# Patient Record
Sex: FEMALE | Race: WHITE | NOT HISPANIC OR LATINO | URBAN - METROPOLITAN AREA
[De-identification: names, ages, dates, MRNs, and addresses within clinical notes are randomized per-mention and may not be internally consistent; named-entity substitution may affect disease eponyms.]

---

## 2017-01-25 ENCOUNTER — OFFICE (AMBULATORY)
Dept: URBAN - METROPOLITAN AREA CLINIC 75 | Facility: CLINIC | Age: 57
End: 2017-01-25

## 2017-01-25 VITALS
SYSTOLIC BLOOD PRESSURE: 126 MMHG | WEIGHT: 143 LBS | HEIGHT: 64 IN | DIASTOLIC BLOOD PRESSURE: 88 MMHG | HEART RATE: 64 BPM

## 2017-01-25 DIAGNOSIS — K90.0 CELIAC DISEASE: ICD-10-CM

## 2017-01-25 DIAGNOSIS — K44.9 DIAPHRAGMATIC HERNIA WITHOUT OBSTRUCTION OR GANGRENE: ICD-10-CM

## 2017-01-25 DIAGNOSIS — R93.3 ABNORMAL FINDINGS ON DIAGNOSTIC IMAGING OF OTHER PARTS OF DI: ICD-10-CM

## 2017-01-25 DIAGNOSIS — K26.9 DUODENAL ULCER, UNSPECIFIED AS ACUTE OR CHRONIC, WITHOUT HEM: ICD-10-CM

## 2017-01-25 DIAGNOSIS — K22.70 BARRETT'S ESOPHAGUS WITHOUT DYSPLASIA: ICD-10-CM

## 2017-01-25 DIAGNOSIS — K52.9 NONINFECTIVE GASTROENTERITIS AND COLITIS, UNSPECIFIED: ICD-10-CM

## 2017-01-25 DIAGNOSIS — R19.7 DIARRHEA, UNSPECIFIED: ICD-10-CM

## 2017-01-25 DIAGNOSIS — R10.84 GENERALIZED ABDOMINAL PAIN: ICD-10-CM

## 2017-01-25 PROCEDURE — 99213 OFFICE O/P EST LOW 20 MIN: CPT | Performed by: INTERNAL MEDICINE

## 2017-01-25 RX ORDER — ESOMEPRAZOLE MAGNESIUM 40 MG/1
40 CAPSULE, DELAYED RELEASE ORAL
Refills: 0 | Status: COMPLETED
End: 2017-01-25

## 2017-01-25 RX ORDER — MESALAMINE 1.2 G/1
TABLET, DELAYED RELEASE ORAL
Qty: 360 | Refills: 3 | Status: COMPLETED
End: 2024-08-14

## 2017-03-03 ENCOUNTER — APPOINTMENT (OUTPATIENT)
Dept: WOMENS IMAGING | Facility: HOSPITAL | Age: 57
End: 2017-03-03

## 2017-03-03 PROCEDURE — 77067 SCR MAMMO BI INCL CAD: CPT | Performed by: RADIOLOGY

## 2017-04-18 ENCOUNTER — OFFICE VISIT (OUTPATIENT)
Dept: FAMILY MEDICINE CLINIC | Facility: CLINIC | Age: 57
End: 2017-04-18

## 2017-04-18 VITALS
OXYGEN SATURATION: 98 % | TEMPERATURE: 98.1 F | BODY MASS INDEX: 24.28 KG/M2 | DIASTOLIC BLOOD PRESSURE: 76 MMHG | HEIGHT: 64 IN | HEART RATE: 74 BPM | SYSTOLIC BLOOD PRESSURE: 120 MMHG | WEIGHT: 142.2 LBS | RESPIRATION RATE: 16 BRPM

## 2017-04-18 DIAGNOSIS — M79.10 MUSCLE SORENESS: ICD-10-CM

## 2017-04-18 DIAGNOSIS — K52.9 NON-SPECIFIC COLITIS: ICD-10-CM

## 2017-04-18 DIAGNOSIS — M54.50 LOW BACK PAIN WITHOUT SCIATICA, UNSPECIFIED BACK PAIN LATERALITY, UNSPECIFIED CHRONICITY: ICD-10-CM

## 2017-04-18 DIAGNOSIS — R73.9 HYPERGLYCEMIA: ICD-10-CM

## 2017-04-18 DIAGNOSIS — Z00.00 HEALTH CARE MAINTENANCE: Primary | ICD-10-CM

## 2017-04-18 PROCEDURE — 99396 PREV VISIT EST AGE 40-64: CPT | Performed by: INTERNAL MEDICINE

## 2017-04-18 RX ORDER — ESTRADIOL 0.04 MG/D
FILM, EXTENDED RELEASE TRANSDERMAL
COMMUNITY
Start: 2017-04-16 | End: 2017-04-18

## 2017-04-18 NOTE — PROGRESS NOTES
"Subjective   Patient ID: Allyson Wilder is a 56 y.o. female presents with   Chief Complaint   Patient presents with   • Annual Exam       HPI - this patient presents today for yearly physical.  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  Overall she feels good with the exception of some muscle soreness she walks 3-5 miles daily.  Massages help this.  She is caught up on colonoscopy and mammogram and Pap.    Assessment plan    Health care maintenance-caught up on routine screening-continue healthy lifestyle.  Fasting lipid profile    Hyperglycemia CMP and A1c    Muscle soreness will get a CK-she has just a little scoliosis and I suspect that her daily walks 7 days a week are muscle soreness from her scoliosis and her excessive walking.    Low back pain-reviewed her old x-rays.    Nonspecific colitis-she sees GI and is on mesalamine well controlled.        Allergies   Allergen Reactions   • Dm-Guaifenesin Er      Other reaction(s): Dizziness   • Topiramate GI Intolerance     Other reaction(s): Hallucinations       The following portions of the patient's history were reviewed and updated as appropriate: allergies, current medications, past family history, past medical history, past social history, past surgical history and problem list.      Review of Systems   Constitutional: Negative.    HENT: Negative.    Eyes: Negative.    Respiratory: Negative.    Cardiovascular: Negative.    Gastrointestinal: Negative.    Endocrine: Negative.    Genitourinary: Negative.    Musculoskeletal: Positive for myalgias.   Skin: Negative.    Allergic/Immunologic: Negative.    Neurological: Negative.    Hematological: Negative.    Psychiatric/Behavioral: Negative.        Objective     Vitals:    04/18/17 1454   BP: 120/76   Pulse: 74   Resp: 16   Temp: 98.1 °F (36.7 °C)   TempSrc: Oral   SpO2: 98%   Weight: 142 lb 3.2 oz (64.5 kg)   Height: 64\" (162.6 cm)         Physical Exam   Constitutional: " She is oriented to person, place, and time. She appears well-developed and well-nourished. No distress.   HENT:   Head: Normocephalic and atraumatic.   Eyes: Conjunctivae and EOM are normal. Pupils are equal, round, and reactive to light. Right eye exhibits no discharge. Left eye exhibits no discharge. No scleral icterus.   Neck: Normal range of motion. Neck supple. No tracheal deviation present. No thyromegaly present.   Cardiovascular: Normal rate, regular rhythm, normal heart sounds and normal pulses.  Exam reveals no gallop.    No murmur heard.  Pulmonary/Chest: Effort normal and breath sounds normal. No respiratory distress. She has no wheezes. She has no rales. She exhibits no tenderness.   Musculoskeletal: Normal range of motion.   Neurological: She is alert and oriented to person, place, and time. She exhibits normal muscle tone. Coordination normal.   Skin: Skin is warm and dry. No rash noted. No erythema. No pallor.   Psychiatric: She has a normal mood and affect. Her behavior is normal. Judgment and thought content normal.   Nursing note and vitals reviewed.        Allyson was seen today for annual exam.    Diagnoses and all orders for this visit:    Health care maintenance  -     Lipid Panel  -     CBC & Differential  -     Comprehensive Metabolic Panel  -     TSH+Free T4  -     CK  -     Hemoglobin A1c    Non-specific colitis  -     Lipid Panel  -     CBC & Differential  -     Comprehensive Metabolic Panel  -     TSH+Free T4  -     CK  -     Hemoglobin A1c    Low back pain without sciatica, unspecified back pain laterality, unspecified chronicity  -     Lipid Panel  -     CBC & Differential  -     Comprehensive Metabolic Panel  -     TSH+Free T4  -     CK  -     Hemoglobin A1c    Muscle soreness  -     Lipid Panel  -     CBC & Differential  -     Comprehensive Metabolic Panel  -     TSH+Free T4  -     CK  -     Hemoglobin A1c    Hyperglycemia  -     Lipid Panel  -     CBC & Differential  -      Comprehensive Metabolic Panel  -     TSH+Free T4  -     CK  -     Hemoglobin A1c        Call or return to clinic prn if these symptoms worsen or fail to improve as anticipated.

## 2017-04-24 LAB
ALBUMIN SERPL-MCNC: 4.7 G/DL (ref 3.5–5.2)
ALBUMIN/GLOB SERPL: 2.1 G/DL
ALP SERPL-CCNC: 51 U/L (ref 39–117)
ALT SERPL-CCNC: 17 U/L (ref 1–33)
AST SERPL-CCNC: 20 U/L (ref 1–32)
BASOPHILS # BLD AUTO: 0.04 10*3/MM3 (ref 0–0.2)
BASOPHILS NFR BLD AUTO: 0.8 % (ref 0–1.5)
BILIRUB SERPL-MCNC: 0.6 MG/DL (ref 0.1–1.2)
BUN SERPL-MCNC: 14 MG/DL (ref 6–20)
BUN/CREAT SERPL: 16.1 (ref 7–25)
CALCIUM SERPL-MCNC: 10.1 MG/DL (ref 8.6–10.5)
CHLORIDE SERPL-SCNC: 100 MMOL/L (ref 98–107)
CHOLEST SERPL-MCNC: 241 MG/DL (ref 0–200)
CK SERPL-CCNC: 374 U/L (ref 20–180)
CO2 SERPL-SCNC: 22.9 MMOL/L (ref 22–29)
CREAT SERPL-MCNC: 0.87 MG/DL (ref 0.57–1)
EOSINOPHIL # BLD AUTO: 0.12 10*3/MM3 (ref 0–0.7)
EOSINOPHIL NFR BLD AUTO: 2.3 % (ref 0.3–6.2)
ERYTHROCYTE [DISTWIDTH] IN BLOOD BY AUTOMATED COUNT: 13.6 % (ref 11.7–13)
GLOBULIN SER CALC-MCNC: 2.2 GM/DL
GLUCOSE SERPL-MCNC: 98 MG/DL (ref 65–99)
HBA1C MFR BLD: 5.1 % (ref 4.8–5.6)
HCT VFR BLD AUTO: 44.4 % (ref 35.6–45.5)
HDLC SERPL-MCNC: 89 MG/DL (ref 40–60)
HGB BLD-MCNC: 14.7 G/DL (ref 11.9–15.5)
IMM GRANULOCYTES # BLD: 0.02 10*3/MM3 (ref 0–0.03)
IMM GRANULOCYTES NFR BLD: 0.4 % (ref 0–0.5)
LDLC SERPL CALC-MCNC: 139 MG/DL (ref 0–100)
LYMPHOCYTES # BLD AUTO: 1.48 10*3/MM3 (ref 0.9–4.8)
LYMPHOCYTES NFR BLD AUTO: 28.8 % (ref 19.6–45.3)
MCH RBC QN AUTO: 32.5 PG (ref 26.9–32)
MCHC RBC AUTO-ENTMCNC: 33.1 G/DL (ref 32.4–36.3)
MCV RBC AUTO: 98.2 FL (ref 80.5–98.2)
MONOCYTES # BLD AUTO: 0.59 10*3/MM3 (ref 0.2–1.2)
MONOCYTES NFR BLD AUTO: 11.5 % (ref 5–12)
NEUTROPHILS # BLD AUTO: 2.88 10*3/MM3 (ref 1.9–8.1)
NEUTROPHILS NFR BLD AUTO: 56.2 % (ref 42.7–76)
PLATELET # BLD AUTO: 304 10*3/MM3 (ref 140–500)
POTASSIUM SERPL-SCNC: 4.5 MMOL/L (ref 3.5–5.2)
PROT SERPL-MCNC: 6.9 G/DL (ref 6–8.5)
RBC # BLD AUTO: 4.52 10*6/MM3 (ref 3.9–5.2)
SODIUM SERPL-SCNC: 140 MMOL/L (ref 136–145)
T4 FREE SERPL-MCNC: 1.37 NG/DL (ref 0.93–1.7)
TRIGL SERPL-MCNC: 66 MG/DL (ref 0–150)
TSH SERPL DL<=0.005 MIU/L-ACNC: 2.51 MIU/ML (ref 0.27–4.2)
VLDLC SERPL CALC-MCNC: 13.2 MG/DL (ref 5–40)
WBC # BLD AUTO: 5.13 10*3/MM3 (ref 4.5–10.7)

## 2017-04-25 DIAGNOSIS — M79.10 MUSCLE SORENESS: Primary | ICD-10-CM

## 2017-04-30 ENCOUNTER — RESULTS ENCOUNTER (OUTPATIENT)
Dept: FAMILY MEDICINE CLINIC | Facility: CLINIC | Age: 57
End: 2017-04-30

## 2017-04-30 DIAGNOSIS — M79.10 MUSCLE SORENESS: ICD-10-CM

## 2017-05-16 ENCOUNTER — OFFICE VISIT (OUTPATIENT)
Dept: FAMILY MEDICINE CLINIC | Facility: CLINIC | Age: 57
End: 2017-05-16

## 2017-05-16 VITALS
SYSTOLIC BLOOD PRESSURE: 120 MMHG | HEIGHT: 64 IN | OXYGEN SATURATION: 99 % | WEIGHT: 139 LBS | BODY MASS INDEX: 23.73 KG/M2 | HEART RATE: 72 BPM | TEMPERATURE: 98 F | DIASTOLIC BLOOD PRESSURE: 78 MMHG

## 2017-05-16 DIAGNOSIS — K22.70 BARRETT'S ESOPHAGUS DETERMINED BY BIOPSY: Primary | ICD-10-CM

## 2017-05-16 DIAGNOSIS — M79.10 MUSCLE SORENESS: ICD-10-CM

## 2017-05-16 PROBLEM — K52.832 LYMPHOCYTIC COLITIS: Status: ACTIVE | Noted: 2017-05-16

## 2017-05-16 PROBLEM — Z79.890 POSTMENOPAUSAL HRT (HORMONE REPLACEMENT THERAPY): Status: ACTIVE | Noted: 2017-05-16

## 2017-05-16 PROCEDURE — 99213 OFFICE O/P EST LOW 20 MIN: CPT | Performed by: FAMILY MEDICINE

## 2017-05-31 ENCOUNTER — TELEPHONE (OUTPATIENT)
Dept: FAMILY MEDICINE CLINIC | Facility: CLINIC | Age: 57
End: 2017-05-31

## 2017-08-22 DIAGNOSIS — G43.909 MIGRAINE WITHOUT STATUS MIGRAINOSUS, NOT INTRACTABLE, UNSPECIFIED MIGRAINE TYPE: ICD-10-CM

## 2017-08-22 RX ORDER — SUMATRIPTAN 25 MG/1
TABLET, FILM COATED ORAL
Qty: 16 TABLET | Refills: 0 | Status: SHIPPED | OUTPATIENT
Start: 2017-08-22 | End: 2018-09-20 | Stop reason: SDUPTHER

## 2017-10-24 VITALS
HEIGHT: 64 IN | DIASTOLIC BLOOD PRESSURE: 68 MMHG | WEIGHT: 141 LBS | HEART RATE: 80 BPM | SYSTOLIC BLOOD PRESSURE: 116 MMHG

## 2017-10-25 ENCOUNTER — OFFICE (AMBULATORY)
Dept: URBAN - METROPOLITAN AREA CLINIC 75 | Facility: CLINIC | Age: 57
End: 2017-10-25

## 2017-10-25 DIAGNOSIS — K22.70 BARRETT'S ESOPHAGUS WITHOUT DYSPLASIA: ICD-10-CM

## 2017-10-25 DIAGNOSIS — K90.0 CELIAC DISEASE: ICD-10-CM

## 2017-10-25 DIAGNOSIS — R10.13 EPIGASTRIC PAIN: ICD-10-CM

## 2017-10-25 DIAGNOSIS — R93.3 ABNORMAL FINDINGS ON DIAGNOSTIC IMAGING OF OTHER PARTS OF DI: ICD-10-CM

## 2017-10-25 DIAGNOSIS — R10.84 GENERALIZED ABDOMINAL PAIN: ICD-10-CM

## 2017-10-25 DIAGNOSIS — K52.9 NONINFECTIVE GASTROENTERITIS AND COLITIS, UNSPECIFIED: ICD-10-CM

## 2017-10-25 DIAGNOSIS — K26.9 DUODENAL ULCER, UNSPECIFIED AS ACUTE OR CHRONIC, WITHOUT HEM: ICD-10-CM

## 2017-10-25 DIAGNOSIS — K44.9 DIAPHRAGMATIC HERNIA WITHOUT OBSTRUCTION OR GANGRENE: ICD-10-CM

## 2017-10-25 PROCEDURE — 99213 OFFICE O/P EST LOW 20 MIN: CPT | Performed by: INTERNAL MEDICINE

## 2018-03-06 ENCOUNTER — APPOINTMENT (OUTPATIENT)
Dept: WOMENS IMAGING | Facility: HOSPITAL | Age: 58
End: 2018-03-06

## 2018-03-06 PROCEDURE — 77067 SCR MAMMO BI INCL CAD: CPT | Performed by: RADIOLOGY

## 2018-03-06 PROCEDURE — 77063 BREAST TOMOSYNTHESIS BI: CPT | Performed by: RADIOLOGY

## 2018-03-08 VITALS
SYSTOLIC BLOOD PRESSURE: 123 MMHG | HEART RATE: 74 BPM | DIASTOLIC BLOOD PRESSURE: 71 MMHG | HEART RATE: 67 BPM | OXYGEN SATURATION: 99 % | TEMPERATURE: 98.3 F | DIASTOLIC BLOOD PRESSURE: 85 MMHG | WEIGHT: 137 LBS | HEIGHT: 64 IN | RESPIRATION RATE: 18 BRPM | SYSTOLIC BLOOD PRESSURE: 137 MMHG | DIASTOLIC BLOOD PRESSURE: 76 MMHG | RESPIRATION RATE: 23 BRPM | HEART RATE: 60 BPM | SYSTOLIC BLOOD PRESSURE: 115 MMHG | HEART RATE: 66 BPM | HEART RATE: 75 BPM | DIASTOLIC BLOOD PRESSURE: 70 MMHG | HEART RATE: 76 BPM | HEART RATE: 70 BPM | DIASTOLIC BLOOD PRESSURE: 77 MMHG | RESPIRATION RATE: 16 BRPM | SYSTOLIC BLOOD PRESSURE: 153 MMHG | HEART RATE: 64 BPM | SYSTOLIC BLOOD PRESSURE: 147 MMHG | HEART RATE: 71 BPM | DIASTOLIC BLOOD PRESSURE: 97 MMHG | DIASTOLIC BLOOD PRESSURE: 114 MMHG | OXYGEN SATURATION: 100 % | HEART RATE: 69 BPM | OXYGEN SATURATION: 95 % | SYSTOLIC BLOOD PRESSURE: 158 MMHG | SYSTOLIC BLOOD PRESSURE: 157 MMHG | DIASTOLIC BLOOD PRESSURE: 87 MMHG | SYSTOLIC BLOOD PRESSURE: 142 MMHG | HEART RATE: 80 BPM | TEMPERATURE: 97.2 F | SYSTOLIC BLOOD PRESSURE: 116 MMHG | SYSTOLIC BLOOD PRESSURE: 118 MMHG | RESPIRATION RATE: 28 BRPM | OXYGEN SATURATION: 98 % | RESPIRATION RATE: 10 BRPM | RESPIRATION RATE: 21 BRPM | OXYGEN SATURATION: 96 % | DIASTOLIC BLOOD PRESSURE: 86 MMHG | DIASTOLIC BLOOD PRESSURE: 75 MMHG

## 2018-03-09 ENCOUNTER — OFFICE (AMBULATORY)
Dept: URBAN - METROPOLITAN AREA PATHOLOGY 4 | Facility: PATHOLOGY | Age: 58
End: 2018-03-09

## 2018-03-09 ENCOUNTER — AMBULATORY SURGICAL CENTER (AMBULATORY)
Dept: URBAN - METROPOLITAN AREA SURGERY 17 | Facility: SURGERY | Age: 58
End: 2018-03-09

## 2018-03-09 DIAGNOSIS — K21.0 GASTRO-ESOPHAGEAL REFLUX DISEASE WITH ESOPHAGITIS: ICD-10-CM

## 2018-03-09 DIAGNOSIS — K29.60 OTHER GASTRITIS WITHOUT BLEEDING: ICD-10-CM

## 2018-03-09 DIAGNOSIS — K22.4 DYSKINESIA OF ESOPHAGUS: ICD-10-CM

## 2018-03-09 DIAGNOSIS — K44.9 DIAPHRAGMATIC HERNIA WITHOUT OBSTRUCTION OR GANGRENE: ICD-10-CM

## 2018-03-09 DIAGNOSIS — K22.70 BARRETT'S ESOPHAGUS WITHOUT DYSPLASIA: ICD-10-CM

## 2018-03-09 LAB
GI HISTOLOGY: A. UNSPECIFIED: (no result)
GI HISTOLOGY: B. SELECT: (no result)
GI HISTOLOGY: C. UNSPECIFIED: (no result)
GI HISTOLOGY: PDF REPORT: (no result)

## 2018-03-09 PROCEDURE — 88305 TISSUE EXAM BY PATHOLOGIST: CPT | Performed by: INTERNAL MEDICINE

## 2018-03-09 PROCEDURE — 88342 IMHCHEM/IMCYTCHM 1ST ANTB: CPT | Performed by: INTERNAL MEDICINE

## 2018-03-09 PROCEDURE — 43239 EGD BIOPSY SINGLE/MULTIPLE: CPT | Performed by: INTERNAL MEDICINE

## 2018-03-09 RX ADMIN — PROPOFOL 50 MG: 10 INJECTION, EMULSION INTRAVENOUS at 09:54

## 2018-03-09 RX ADMIN — PROPOFOL 150 MG: 10 INJECTION, EMULSION INTRAVENOUS at 09:51

## 2018-04-30 DIAGNOSIS — R73.9 HYPERGLYCEMIA: ICD-10-CM

## 2018-04-30 DIAGNOSIS — Z00.00 HEALTH CARE MAINTENANCE: ICD-10-CM

## 2018-04-30 DIAGNOSIS — R73.09 ELEVATED HEMOGLOBIN A1C: Primary | ICD-10-CM

## 2018-04-30 LAB
ALBUMIN SERPL-MCNC: 4.4 G/DL (ref 3.5–5.2)
ALBUMIN/GLOB SERPL: 1.9 G/DL
ALP SERPL-CCNC: 47 U/L (ref 40–129)
ALT SERPL-CCNC: 17 U/L (ref 5–33)
AST SERPL-CCNC: 20 U/L (ref 5–32)
BILIRUB SERPL-MCNC: 0.5 MG/DL (ref 0.2–1.2)
BUN SERPL-MCNC: 13 MG/DL (ref 6–20)
BUN/CREAT SERPL: 17.1 (ref 7–25)
CALCIUM SERPL-MCNC: 9.6 MG/DL (ref 8.6–10.5)
CHLORIDE SERPL-SCNC: 100 MMOL/L (ref 98–107)
CHOLEST SERPL-MCNC: 228 MG/DL (ref 0–200)
CO2 SERPL-SCNC: 27.2 MMOL/L (ref 22–29)
CREAT SERPL-MCNC: 0.76 MG/DL (ref 0.57–1)
ERYTHROCYTE [DISTWIDTH] IN BLOOD BY AUTOMATED COUNT: 13 % (ref 11.5–14.5)
GFR SERPLBLD CREATININE-BSD FMLA CKD-EPI: 78 ML/MIN/1.73
GFR SERPLBLD CREATININE-BSD FMLA CKD-EPI: 95 ML/MIN/1.73
GLOBULIN SER CALC-MCNC: 2.3 GM/DL
GLUCOSE SERPL-MCNC: 97 MG/DL (ref 65–99)
HCT VFR BLD AUTO: 43.7 % (ref 37–47)
HDLC SERPL-MCNC: 86 MG/DL (ref 40–60)
HGB BLD-MCNC: 14.7 G/DL (ref 12–16)
LDLC SERPL CALC-MCNC: 129 MG/DL (ref 0–100)
LDLC/HDLC SERPL: 1.5 {RATIO}
MCH RBC QN AUTO: 32.7 PG (ref 27–31)
MCHC RBC AUTO-ENTMCNC: 33.6 G/DL (ref 31–37)
MCV RBC AUTO: 97.3 FL (ref 81–99)
PLATELET # BLD AUTO: 292 10*3/MM3 (ref 140–500)
POTASSIUM SERPL-SCNC: 4.3 MMOL/L (ref 3.5–5.2)
PROT SERPL-MCNC: 6.7 G/DL (ref 6–8.5)
RBC # BLD AUTO: 4.49 10*6/MM3 (ref 4.2–5.4)
SODIUM SERPL-SCNC: 139 MMOL/L (ref 136–145)
TRIGL SERPL-MCNC: 67 MG/DL (ref 0–150)
TSH SERPL DL<=0.005 MIU/L-ACNC: 2.42 MIU/ML (ref 0.27–4.2)
VLDLC SERPL CALC-MCNC: 13.4 MG/DL (ref 7–27)
WBC # BLD AUTO: 4.2 10*3/MM3 (ref 4.8–10.8)

## 2018-05-07 ENCOUNTER — OFFICE VISIT (OUTPATIENT)
Dept: FAMILY MEDICINE CLINIC | Facility: CLINIC | Age: 58
End: 2018-05-07

## 2018-05-07 VITALS
HEART RATE: 75 BPM | DIASTOLIC BLOOD PRESSURE: 80 MMHG | BODY MASS INDEX: 24.11 KG/M2 | TEMPERATURE: 98.3 F | WEIGHT: 141.2 LBS | HEIGHT: 64 IN | SYSTOLIC BLOOD PRESSURE: 122 MMHG | OXYGEN SATURATION: 98 %

## 2018-05-07 DIAGNOSIS — K22.70 BARRETT'S ESOPHAGUS DETERMINED BY BIOPSY: Primary | ICD-10-CM

## 2018-05-07 DIAGNOSIS — M70.62 TROCHANTERIC BURSITIS OF BOTH HIPS: ICD-10-CM

## 2018-05-07 DIAGNOSIS — M70.61 TROCHANTERIC BURSITIS OF BOTH HIPS: ICD-10-CM

## 2018-05-07 DIAGNOSIS — K90.0 CELIAC DISEASE: ICD-10-CM

## 2018-05-07 DIAGNOSIS — Z00.00 HEALTH CARE MAINTENANCE: ICD-10-CM

## 2018-05-07 PROCEDURE — 99396 PREV VISIT EST AGE 40-64: CPT | Performed by: FAMILY MEDICINE

## 2018-05-07 RX ORDER — ESTRADIOL 0.04 MG/D
1 FILM, EXTENDED RELEASE TRANSDERMAL 2 TIMES WEEKLY
COMMUNITY
Start: 2018-05-05

## 2018-05-07 RX ORDER — CLOBETASOL PROPIONATE 0.46 MG/ML
SOLUTION TOPICAL
COMMUNITY
Start: 2018-03-14 | End: 2018-05-07

## 2018-05-07 NOTE — PROGRESS NOTES
"  Chief Complaint   Patient presents with   • Annual Exam   • Hip Pain     Left        Subjective   Allyson Wilder is a 57 y.o. female and is here for a yearly physical exam. The patient reports problems - left hip bursitis.    Do you take any herbs or supplements that were not prescribed by a doctor? yes. If so, these will be added to active medication list.    The following portions of the patient's history were reviewed and updated as appropriate: allergies, current medications, past family history, past medical history, past social history, past surgical history and problem list.    Social and Family and Surgical History reviewed and updated today, see Rooming tab.    Health History, Preventive Measures and Vaccination flow sheets reviewed and updated today.    Patient's current medical chart in Epic; including previous office notes, imaging, labs, specialist's evaluation either in notes or in Media tab reviewed today.    Other pertinent medical information also reviewed thru Care Everywhere function is also reviewed today.    Review of Systems  Review of Systems  A comprehensive review of systems was negative except for: Gastrointestinal: positive for change in bowel habits  Musculoskeletal: positive for arthralgias and stiff joints  Neurological: positive for headaches    Vitals:    05/07/18 0759   BP: 122/80   BP Location: Left arm   Patient Position: Sitting   Pulse: 75   Temp: 98.3 °F (36.8 °C)   SpO2: 98%   Weight: 64 kg (141 lb 3.2 oz)   Height: 162.6 cm (64\")       General Appearance:  Alert, cooperative, no distress, appears stated age   Head:  Normocephalic, without obvious abnormality, atraumatic   Eyes:  PERRL, conjunctiva/corneas clear, EOM's intact.   Ears:  Normal TM's and external ear canals, both ears   Nose: Nares normal, septum midline, mucosa normal, no drainage or sinus tenderness   Throat: Lips, mucosa, and tongue normal; teeth and gums normal   Neck: Supple, symmetrical, trachea midline, no " adenopathy;   thyroid: No enlargement/tenderness/nodules; no carotid  bruit   Back:  Symmetric, no curvature, ROM normal, no CVA tenderness   Lungs:  Clear to auscultation bilaterally, respirations unlabored   Chest wall:  No tenderness or deformity   Heart:  Regular rate and rhythm, S1 and S2 normal, no murmur, rub or gallop   Abdomen:  Soft, non-tender, bowel sounds active all four quadrants,   no masses, no organomegaly   Rectal:        Extremities: Extremities normal, atraumatic, no cyanosis or edema   Pulses: 2+ and symmetric all extremities   Skin: Skin color, texture, turgor normal, no rashes or lesions   Lymph nodes: Cervical, supraclavicular, and axillary nodes normal   Neurologic: CNII-XII intact. Normal strength, sensation and reflexes   throughout          Results for orders placed or performed in visit on 04/30/18   Comprehensive metabolic panel   Result Value Ref Range    Glucose 97 65 - 99 mg/dL    BUN 13 6 - 20 mg/dL    Creatinine 0.76 0.57 - 1.00 mg/dL    eGFR Non African Am 78 >60 mL/min/1.73    eGFR African Am 95 >60 mL/min/1.73    BUN/Creatinine Ratio 17.1 7.0 - 25.0    Sodium 139 136 - 145 mmol/L    Potassium 4.3 3.5 - 5.2 mmol/L    Chloride 100 98 - 107 mmol/L    Total CO2 27.2 22.0 - 29.0 mmol/L    Calcium 9.6 8.6 - 10.5 mg/dL    Total Protein 6.7 6.0 - 8.5 g/dL    Albumin 4.40 3.50 - 5.20 g/dL    Globulin 2.3 gm/dL    A/G Ratio 1.9 g/dL    Total Bilirubin 0.5 0.2 - 1.2 mg/dL    Alkaline Phosphatase 47 40 - 129 U/L    AST (SGOT) 20 5 - 32 U/L    ALT (SGPT) 17 5 - 33 U/L   CBC (No Diff)   Result Value Ref Range    WBC 4.20 (L) 4.80 - 10.80 10*3/mm3    RBC 4.49 4.20 - 5.40 10*6/mm3    Hemoglobin 14.7 12.0 - 16.0 g/dL    Hematocrit 43.7 37.0 - 47.0 %    MCV 97.3 81.0 - 99.0 fL    MCH 32.7 (H) 27.0 - 31.0 pg    MCHC 33.6 31.0 - 37.0 g/dL    RDW 13.0 11.5 - 14.5 %    Platelets 292 140 - 500 10*3/mm3   TSH   Result Value Ref Range    TSH 2.420 0.270 - 4.200 mIU/mL   Lipid Panel With LDL / HDL Ratio    Result Value Ref Range    Total Cholesterol 228 (H) 0 - 200 mg/dL    Triglycerides 67 0 - 150 mg/dL    HDL Cholesterol 86 (H) 40 - 60 mg/dL    VLDL Cholesterol 13.4 7 - 27 mg/dL    LDL Cholesterol  129 (H) 0 - 100 mg/dL    LDL/HDL Ratio 1.50      Assessment/Plan   Healthy female exam.  Allyson was seen today for annual exam and hip pain.    Diagnoses and all orders for this visit:    Du's esophagus determined by biopsy    Celiac disease    Health care maintenance    Trochanteric bursitis of both hips  -     diclofenac (VOLTAREN) 1 % gel gel; Apply 4 g topically 4 (Four) Times a Day As Needed (hip).      1. Chol up some, rest of labs are all ok  Colitis and Barretts are stable.  Will try Voltaren gel for her hip.  Will need to avoid NSAIDS due to GI issues  2. Patient Counseling:  --Nutrition: Stressed importance of moderation in sodium/caffeine intake, saturated fat and cholesterol.  Discussed caloric balance, sufficient intake of fresh fruits, vegetables, fiber, calcium, iron.ok  --Discussed the daily use of baby aspirin, if indicated.not needed  --Exercise: Stressed the importance of regular exercise. ok  --Substance Abuse: Discussed cessation/primary prevention of tobacco, alcohol, or other drug use; driving or other dangerous activities under the influence. ok   --Dental health: Discussed importance of regular tooth brushing, flossing, and dental visits.utd  -- suggested having eyes and vision checked if needed or past due.  --Immunizations reviewed.  --Discussed benefits of screening colonoscopy.utd  3. Discussed the patient's BMI with her.  The BMI is in the acceptable range  4. Follow up in one year    There are no Patient Instructions on file for this visit.    Medications Discontinued During This Encounter   Medication Reason   • estradiol (MINIVELLE, VIVELLE-DOT) 0.05 MG/24HR patch Dose adjustment   • clobetasol (TEMOVATE) 0.05 % external solution *Therapy completed   • co-enzyme Q-10 30 MG capsule  *Therapy completed   • fluticasone (FLONASE) 50 MCG/ACT nasal spray *Therapy completed        Dr. Dav Palacios MD  [place], [place]  [facility]

## 2018-08-08 ENCOUNTER — OFFICE (AMBULATORY)
Dept: URBAN - METROPOLITAN AREA CLINIC 75 | Facility: CLINIC | Age: 58
End: 2018-08-08

## 2018-08-08 VITALS
SYSTOLIC BLOOD PRESSURE: 118 MMHG | DIASTOLIC BLOOD PRESSURE: 78 MMHG | HEART RATE: 76 BPM | HEIGHT: 64 IN | WEIGHT: 142 LBS

## 2018-08-08 DIAGNOSIS — K44.9 DIAPHRAGMATIC HERNIA WITHOUT OBSTRUCTION OR GANGRENE: ICD-10-CM

## 2018-08-08 DIAGNOSIS — K90.0 CELIAC DISEASE: ICD-10-CM

## 2018-08-08 DIAGNOSIS — R19.7 DIARRHEA, UNSPECIFIED: ICD-10-CM

## 2018-08-08 DIAGNOSIS — R10.13 EPIGASTRIC PAIN: ICD-10-CM

## 2018-08-08 DIAGNOSIS — R93.3 ABNORMAL FINDINGS ON DIAGNOSTIC IMAGING OF OTHER PARTS OF DI: ICD-10-CM

## 2018-08-08 DIAGNOSIS — R10.84 GENERALIZED ABDOMINAL PAIN: ICD-10-CM

## 2018-08-08 DIAGNOSIS — K22.4 DYSKINESIA OF ESOPHAGUS: ICD-10-CM

## 2018-08-08 DIAGNOSIS — K26.9 DUODENAL ULCER, UNSPECIFIED AS ACUTE OR CHRONIC, WITHOUT HEM: ICD-10-CM

## 2018-08-08 DIAGNOSIS — K29.70 GASTRITIS, UNSPECIFIED, WITHOUT BLEEDING: ICD-10-CM

## 2018-08-08 DIAGNOSIS — K29.80 DUODENITIS WITHOUT BLEEDING: ICD-10-CM

## 2018-08-08 DIAGNOSIS — K22.70 BARRETT'S ESOPHAGUS WITHOUT DYSPLASIA: ICD-10-CM

## 2018-08-08 DIAGNOSIS — K52.9 NONINFECTIVE GASTROENTERITIS AND COLITIS, UNSPECIFIED: ICD-10-CM

## 2018-08-08 PROCEDURE — 99213 OFFICE O/P EST LOW 20 MIN: CPT | Performed by: INTERNAL MEDICINE

## 2018-09-20 ENCOUNTER — OFFICE VISIT (OUTPATIENT)
Dept: FAMILY MEDICINE CLINIC | Facility: CLINIC | Age: 58
End: 2018-09-20

## 2018-09-20 VITALS
WEIGHT: 142.8 LBS | HEIGHT: 64 IN | HEART RATE: 66 BPM | SYSTOLIC BLOOD PRESSURE: 120 MMHG | BODY MASS INDEX: 24.38 KG/M2 | OXYGEN SATURATION: 98 % | DIASTOLIC BLOOD PRESSURE: 78 MMHG

## 2018-09-20 DIAGNOSIS — G43.909 MIGRAINE WITHOUT STATUS MIGRAINOSUS, NOT INTRACTABLE, UNSPECIFIED MIGRAINE TYPE: ICD-10-CM

## 2018-09-20 PROCEDURE — 99213 OFFICE O/P EST LOW 20 MIN: CPT | Performed by: FAMILY MEDICINE

## 2018-09-20 RX ORDER — SUMATRIPTAN 25 MG/1
25 TABLET, FILM COATED ORAL
Qty: 9 TABLET | Refills: 0 | Status: SHIPPED | OUTPATIENT
Start: 2018-09-20 | End: 2019-12-17 | Stop reason: SDUPTHER

## 2018-09-20 RX ORDER — CYCLOBENZAPRINE HCL 10 MG
TABLET ORAL
Refills: 0 | COMMUNITY
Start: 2018-07-12 | End: 2018-09-20

## 2018-09-20 NOTE — PROGRESS NOTES
Subjective   Allyson Wilder is a 57 y.o. female who is here for   Chief Complaint   Patient presents with   • Earache     right ear x 3 days    .     History of Present Illness   Had wax in right ear last ov  She has been using peroxide to clean out  Feels clogged today.    Also needs refill of  Imitrex , just in case    The following portions of the patient's history were reviewed and updated as appropriate: allergies, current medications, past medical history, past social history and problem list.    Review of Systems    Objective   Physical Exam   HENT:   Right Ear: Tympanic membrane and ear canal normal.   Nursing note and vitals reviewed.      Assessment/Plan   Allyson was seen today for earache.    Diagnoses and all orders for this visit:    Migraine without status migrainosus, not intractable, unspecified migraine type  -     SUMAtriptan (IMITREX) 25 MG tablet; Take 1 tablet by mouth Every 2 (Two) Hours As Needed for Migraine.      May have some middle ear pressure.    There are no Patient Instructions on file for this visit.    Medications Discontinued During This Encounter   Medication Reason   • cyclobenzaprine (FLEXERIL) 10 MG tablet *Therapy completed   • SUMAtriptan (IMITREX) 25 MG tablet Reorder        No Follow-up on file.    Dr. Dav Palacios  [place], [place]

## 2018-12-04 ENCOUNTER — OFFICE VISIT (OUTPATIENT)
Dept: FAMILY MEDICINE CLINIC | Facility: CLINIC | Age: 58
End: 2018-12-04

## 2018-12-04 VITALS
DIASTOLIC BLOOD PRESSURE: 76 MMHG | SYSTOLIC BLOOD PRESSURE: 124 MMHG | TEMPERATURE: 98.2 F | HEART RATE: 95 BPM | OXYGEN SATURATION: 98 % | HEIGHT: 64 IN | BODY MASS INDEX: 24.41 KG/M2 | WEIGHT: 143 LBS | RESPIRATION RATE: 16 BRPM

## 2018-12-04 VITALS
WEIGHT: 145 LBS | SYSTOLIC BLOOD PRESSURE: 118 MMHG | HEART RATE: 68 BPM | DIASTOLIC BLOOD PRESSURE: 60 MMHG | HEIGHT: 64 IN

## 2018-12-04 DIAGNOSIS — B37.31 VAGINAL YEAST INFECTION: Primary | ICD-10-CM

## 2018-12-04 DIAGNOSIS — N39.0 URINARY TRACT INFECTION WITHOUT HEMATURIA, SITE UNSPECIFIED: ICD-10-CM

## 2018-12-04 LAB
BILIRUB BLD-MCNC: NEGATIVE MG/DL
CLARITY, POC: CLEAR
COLOR UR: NORMAL
GLUCOSE UR STRIP-MCNC: NEGATIVE MG/DL
KETONES UR QL: NEGATIVE
LEUKOCYTE EST, POC: NEGATIVE
NITRITE UR-MCNC: NEGATIVE MG/ML
PH UR: 5 [PH] (ref 5–8)
PROT UR STRIP-MCNC: NEGATIVE MG/DL
RBC # UR STRIP: NEGATIVE /UL
SP GR UR: 1.01 (ref 1–1.03)
UROBILINOGEN UR QL: NORMAL

## 2018-12-04 PROCEDURE — 99213 OFFICE O/P EST LOW 20 MIN: CPT | Performed by: NURSE PRACTITIONER

## 2018-12-04 PROCEDURE — 81002 URINALYSIS NONAUTO W/O SCOPE: CPT | Performed by: NURSE PRACTITIONER

## 2018-12-04 RX ORDER — FLUCONAZOLE 150 MG/1
150 TABLET ORAL ONCE
Qty: 1 TABLET | Refills: 0 | Status: SHIPPED | OUTPATIENT
Start: 2018-12-04 | End: 2018-12-04

## 2018-12-04 NOTE — PATIENT INSTRUCTIONS
Vaginal Yeast infection, Adult  Vaginal yeast infection is a condition that causes soreness, swelling, and redness (inflammation) of the vagina. It also causes vaginal discharge. This is a common condition. Some women get this infection frequently.  What are the causes?  This condition is caused by a change in the normal balance of the yeast (candida) and bacteria that live in the vagina. This change causes an overgrowth of yeast, which causes the inflammation.  What increases the risk?  This condition is more likely to develop in:  · Women who take antibiotic medicines.  · Women who have diabetes.  · Women who take birth control pills.  · Women who are pregnant.  · Women who douche often.  · Women who have a weak defense (immune) system.  · Women who have been taking steroid medicines for a long time.  · Women who frequently wear tight clothing.    What are the signs or symptoms?  Symptoms of this condition include:  · White, thick vaginal discharge.  · Swelling, itching, redness, and irritation of the vagina. The lips of the vagina (vulva) may be affected as well.  · Pain or a burning feeling while urinating.  · Pain during sex.    How is this diagnosed?  This condition is diagnosed with a medical history and physical exam. This will include a pelvic exam. Your health care provider will examine a sample of your vaginal discharge under a microscope. Your health care provider may send this sample for testing to confirm the diagnosis.  How is this treated?  This condition is treated with medicine. Medicines may be over-the-counter or prescription. You may be told to use one or more of the following:  · Medicine that is taken orally.  · Medicine that is applied as a cream.  · Medicine that is inserted directly into the vagina (suppository).    Follow these instructions at home:  · Take or apply over-the-counter and prescription medicines only as told by your health care provider.  · Do not have sex until your health  care provider has approved. Tell your sex partner that you have a yeast infection. That person should go to his or her health care provider if he or she develops symptoms.  · Do not wear tight clothes, such as pantyhose or tight pants.  · Avoid using tampons until your health care provider approves.  · Eat more yogurt. This may help to keep your yeast infection from returning.  · Try taking a sitz bath to help with discomfort. This is a warm water bath that is taken while you are sitting down. The water should only come up to your hips and should cover your buttocks. Do this 3-4 times per day or as told by your health care provider.  · Do not douche.  · Wear breathable, cotton underwear.  · If you have diabetes, keep your blood sugar levels under control.  Contact a health care provider if:  · You have a fever.  · Your symptoms go away and then return.  · Your symptoms do not get better with treatment.  · Your symptoms get worse.  · You have new symptoms.  · You develop blisters in or around your vagina.  · You have blood coming from your vagina and it is not your menstrual period.  · You develop pain in your abdomen.  This information is not intended to replace advice given to you by your health care provider. Make sure you discuss any questions you have with your health care provider.  Document Released: 09/27/2006 Document Revised: 05/31/2017 Document Reviewed: 06/20/2016  its learning Interactive Patient Education © 2018 [employer]

## 2018-12-04 NOTE — PROGRESS NOTES
"Chief Complaint   Patient presents with   • Urinary Tract Infection       Urinary Tract Infection: Patient complains of frequency and vaginal burning She has had symptoms for 2 weeks. Patient also complains of \"hot throat\". Patient denies back pain, cough, fever and vaginal discharge. Patient does not have a history of recurrent UTI.  Patient does not have a history of pyelonephritis. Seen at  in [place].  Was prescribed Bactrim which she did not tolerate due to stomach upset.  She was then given Macrobid in which she noticed a rash to back after taking for a few days.  She stopped taking yesterday and rash is already improving.    Recently traveled to [place] and swam frequently in pool.  States the burning with urination occurs when the urine touches her skin in vaginal area.  Urinary frequency however feels related due to drinking plenty of water.      The following portions of the patient's history were reviewed and updated as appropriate: allergies, current medications, past family history, past medical history, past social history, past surgical history and problem list.    Review of Systems   Genitourinary: Positive for frequency. Negative for dysuria.   Skin: Positive for rash.   All other systems reviewed and are negative.      Vitals:    12/04/18 1255   BP: 124/76   BP Location: Left arm   Patient Position: Sitting   Cuff Size: Adult   Pulse: 95   Resp: 16   Temp: 98.2 °F (36.8 °C)   SpO2: 98%   Weight: 64.9 kg (143 lb)   Height: 162.6 cm (64\")     Gen: Well appearing, alert  HEENT: WNL  Lung: Regular RR, no audible wheeze  Heart: RR without murmur  Skin: Flat pink rash to lower back and bilateral buttocks, W/D  Abd: Non tender, non distended, positive bowel sounds  Pelvic: Vaginal erythema.  No noticeable discharge.  Accompanied per Angelita roa MA, for exam.  Flank: No CVA tenderness.    In office urine dipstick results:  Brief Urine Lab Results  (Last result in the past 365 days)      Color   Clarity  "  Blood   Leuk Est   Nitrite   Protein   CREAT   Urine HCG        12/04/18 1311 Straw Clear Negative Negative Negative Negative               Assessment/Plan   Allyson was seen today for urinary tract infection.    Diagnoses and all orders for this visit:    Vaginal yeast infection  -     fluconazole (DIFLUCAN) 150 MG tablet; Take 1 tablet by mouth 1 (One) Time for 1 dose.    Urinary tract infection without hematuria, site unspecified  -     POC Urinalysis Dipstick  -     Urine Culture - Urine, Urine, Random Void         Summary:  Appears her symptoms of dysuria are related to yeast infection.  Treat with diflucan.  Urinalysis negative however results can be altered due to recent use of antibiotics. Will send for culture.  Notify us if no improvement or any concerns.         Jinny Head, APRN  Family Practice  [facility] [place]

## 2018-12-05 ENCOUNTER — OFFICE (AMBULATORY)
Dept: URBAN - METROPOLITAN AREA CLINIC 75 | Facility: CLINIC | Age: 58
End: 2018-12-05
Payer: COMMERCIAL

## 2018-12-05 DIAGNOSIS — R10.84 GENERALIZED ABDOMINAL PAIN: ICD-10-CM

## 2018-12-05 DIAGNOSIS — R93.3 ABNORMAL FINDINGS ON DIAGNOSTIC IMAGING OF OTHER PARTS OF DI: ICD-10-CM

## 2018-12-05 DIAGNOSIS — R10.13 EPIGASTRIC PAIN: ICD-10-CM

## 2018-12-05 DIAGNOSIS — K52.9 NONINFECTIVE GASTROENTERITIS AND COLITIS, UNSPECIFIED: ICD-10-CM

## 2018-12-05 DIAGNOSIS — R19.7 DIARRHEA, UNSPECIFIED: ICD-10-CM

## 2018-12-05 DIAGNOSIS — K26.9 DUODENAL ULCER, UNSPECIFIED AS ACUTE OR CHRONIC, WITHOUT HEM: ICD-10-CM

## 2018-12-05 DIAGNOSIS — K90.0 CELIAC DISEASE: ICD-10-CM

## 2018-12-05 DIAGNOSIS — K22.4 DYSKINESIA OF ESOPHAGUS: ICD-10-CM

## 2018-12-05 DIAGNOSIS — K44.9 DIAPHRAGMATIC HERNIA WITHOUT OBSTRUCTION OR GANGRENE: ICD-10-CM

## 2018-12-05 DIAGNOSIS — K29.70 GASTRITIS, UNSPECIFIED, WITHOUT BLEEDING: ICD-10-CM

## 2018-12-05 DIAGNOSIS — K22.70 BARRETT'S ESOPHAGUS WITHOUT DYSPLASIA: ICD-10-CM

## 2018-12-05 DIAGNOSIS — K29.80 DUODENITIS WITHOUT BLEEDING: ICD-10-CM

## 2018-12-05 PROCEDURE — 99213 OFFICE O/P EST LOW 20 MIN: CPT | Performed by: INTERNAL MEDICINE

## 2018-12-07 LAB
BACTERIA UR CULT: NORMAL
BACTERIA UR CULT: NORMAL

## 2019-03-14 ENCOUNTER — APPOINTMENT (OUTPATIENT)
Dept: WOMENS IMAGING | Facility: HOSPITAL | Age: 59
End: 2019-03-14

## 2019-03-14 PROCEDURE — 77067 SCR MAMMO BI INCL CAD: CPT | Performed by: RADIOLOGY

## 2019-03-14 PROCEDURE — 77063 BREAST TOMOSYNTHESIS BI: CPT | Performed by: RADIOLOGY

## 2019-05-02 DIAGNOSIS — R73.09 ELEVATED HEMOGLOBIN A1C: ICD-10-CM

## 2019-05-02 DIAGNOSIS — Z00.00 HEALTH CARE MAINTENANCE: Primary | ICD-10-CM

## 2019-05-06 LAB
ALBUMIN SERPL-MCNC: 4.1 G/DL (ref 3.5–5.2)
ALBUMIN/GLOB SERPL: 2.1 G/DL
ALP SERPL-CCNC: 42 U/L (ref 39–117)
ALT SERPL-CCNC: 25 U/L (ref 1–33)
APPEARANCE UR: CLEAR
AST SERPL-CCNC: 19 U/L (ref 1–32)
BILIRUB SERPL-MCNC: 0.3 MG/DL (ref 0.2–1.2)
BILIRUB UR QL STRIP: NEGATIVE
BUN SERPL-MCNC: 13 MG/DL (ref 6–20)
BUN/CREAT SERPL: 15.3 (ref 7–25)
CALCIUM SERPL-MCNC: 9.5 MG/DL (ref 8.6–10.5)
CHLORIDE SERPL-SCNC: 103 MMOL/L (ref 98–107)
CHOLEST SERPL-MCNC: 167 MG/DL (ref 0–200)
CO2 SERPL-SCNC: 26.2 MMOL/L (ref 22–29)
COLOR UR: YELLOW
CREAT SERPL-MCNC: 0.85 MG/DL (ref 0.57–1)
ERYTHROCYTE [DISTWIDTH] IN BLOOD BY AUTOMATED COUNT: 12.9 % (ref 12.3–15.4)
GLOBULIN SER CALC-MCNC: 2 GM/DL
GLUCOSE SERPL-MCNC: 95 MG/DL (ref 65–99)
GLUCOSE UR QL: NEGATIVE
HBA1C MFR BLD: 5.3 % (ref 4.8–5.6)
HCT VFR BLD AUTO: 44.7 % (ref 34–46.6)
HDLC SERPL-MCNC: 59 MG/DL (ref 40–60)
HGB BLD-MCNC: 14.4 G/DL (ref 12–15.9)
HGB UR QL STRIP: NEGATIVE
KETONES UR QL STRIP: ABNORMAL
LDLC SERPL CALC-MCNC: 96 MG/DL (ref 0–100)
LEUKOCYTE ESTERASE UR QL STRIP: NEGATIVE
MCH RBC QN AUTO: 32 PG (ref 26.6–33)
MCHC RBC AUTO-ENTMCNC: 32.2 G/DL (ref 31.5–35.7)
MCV RBC AUTO: 99.3 FL (ref 79–97)
NITRITE UR QL STRIP: NEGATIVE
PH UR STRIP: 5.5 [PH] (ref 5–8)
PLATELET # BLD AUTO: 362 10*3/MM3 (ref 140–450)
POTASSIUM SERPL-SCNC: 4.5 MMOL/L (ref 3.5–5.2)
PROT SERPL-MCNC: 6.1 G/DL (ref 6–8.5)
PROT UR QL STRIP: NEGATIVE
RBC # BLD AUTO: 4.5 10*6/MM3 (ref 3.77–5.28)
SODIUM SERPL-SCNC: 140 MMOL/L (ref 136–145)
SP GR UR: 1.02 (ref 1–1.03)
TRIGL SERPL-MCNC: 60 MG/DL (ref 0–150)
TSH SERPL DL<=0.005 MIU/L-ACNC: 2.04 MIU/ML (ref 0.27–4.2)
UROBILINOGEN UR STRIP-MCNC: ABNORMAL MG/DL
VLDLC SERPL CALC-MCNC: 12 MG/DL
WBC # BLD AUTO: 5.39 10*3/MM3 (ref 3.4–10.8)

## 2019-05-13 ENCOUNTER — OFFICE VISIT (OUTPATIENT)
Dept: FAMILY MEDICINE CLINIC | Facility: CLINIC | Age: 59
End: 2019-05-13

## 2019-05-13 VITALS
HEIGHT: 64 IN | SYSTOLIC BLOOD PRESSURE: 124 MMHG | WEIGHT: 131.8 LBS | HEART RATE: 69 BPM | OXYGEN SATURATION: 98 % | BODY MASS INDEX: 22.5 KG/M2 | DIASTOLIC BLOOD PRESSURE: 80 MMHG

## 2019-05-13 DIAGNOSIS — Z00.00 HEALTH CARE MAINTENANCE: Primary | ICD-10-CM

## 2019-05-13 DIAGNOSIS — M70.61 TROCHANTERIC BURSITIS OF BOTH HIPS: ICD-10-CM

## 2019-05-13 DIAGNOSIS — M70.62 TROCHANTERIC BURSITIS OF BOTH HIPS: ICD-10-CM

## 2019-05-13 PROBLEM — M79.10 MUSCLE SORENESS: Status: RESOLVED | Noted: 2017-04-18 | Resolved: 2019-05-13

## 2019-05-13 PROCEDURE — 99396 PREV VISIT EST AGE 40-64: CPT | Performed by: FAMILY MEDICINE

## 2019-06-17 ENCOUNTER — TELEPHONE (OUTPATIENT)
Dept: FAMILY MEDICINE CLINIC | Facility: CLINIC | Age: 59
End: 2019-06-17

## 2019-06-17 NOTE — TELEPHONE ENCOUNTER
Pt called stating she was seen on 05/13/19 and you prescribed her medication for her Gluteal pain. She has used all of the cream and states that she is not at all better  Cream helps sooth  Pain some  but does not eliminate it.  Pt states she need something more. She wants something like physical therapy or something that will resolve pain

## 2019-06-18 NOTE — TELEPHONE ENCOUNTER
If the pain is from trochanteric bursitis, we can try a direct cortisone shot into the bursa on both sides  I can do this some time this week in the office

## 2019-06-21 ENCOUNTER — OFFICE VISIT (OUTPATIENT)
Dept: FAMILY MEDICINE CLINIC | Facility: CLINIC | Age: 59
End: 2019-06-21

## 2019-06-21 VITALS
BODY MASS INDEX: 21.85 KG/M2 | SYSTOLIC BLOOD PRESSURE: 118 MMHG | DIASTOLIC BLOOD PRESSURE: 72 MMHG | WEIGHT: 128 LBS | OXYGEN SATURATION: 98 % | HEART RATE: 71 BPM | HEIGHT: 64 IN

## 2019-06-21 DIAGNOSIS — M70.61 TROCHANTERIC BURSITIS OF BOTH HIPS: Primary | ICD-10-CM

## 2019-06-21 DIAGNOSIS — M70.62 TROCHANTERIC BURSITIS OF BOTH HIPS: Primary | ICD-10-CM

## 2019-06-21 PROCEDURE — 20610 DRAIN/INJ JOINT/BURSA W/O US: CPT | Performed by: FAMILY MEDICINE

## 2019-06-21 NOTE — PROGRESS NOTES
Subjective   Allyson Wilder is a 59 y.o. female who is here for   Chief Complaint   Patient presents with   • Pain     Hip/Gluteal area   .     History of Present Illness   Hip Pain: Patient complains of bilaterally hip pain. Onset of the symptoms was several months ago. Inciting event: distance runner. Current symptoms include is worse after period of inactivity. Associated symptoms: none. Aggravating symptoms: going up and down stairs, rising after sitting, running and squatting. Patient's overall course: gradually worsening. Patient has had no prior hip problems. Previous visits for this problem: yes, last seen 1 month ago by me. Evaluation to date: none.  Treatment to date: none.      The following portions of the patient's history were reviewed and updated as appropriate: allergies, current medications, past family history, past medical history, past social history, past surgical history and problem list.    Review of Systems    Objective     Physical Exam   Musculoskeletal:        Left hip: She exhibits tenderness.        Legs:  Has pain over both trochanteric areas and in both sciatic notches  We can not inject the notches, but trochanteric bursa pain usually have excellent response to injections.   Nursing note and vitals reviewed.    Arthrocentesis  Date/Time: 6/21/2019 2:30 PM  Performed by: Dav Palacios MD  Authorized by: Dav Palacios MD   Consent: Verbal consent obtained.  Risks and benefits: risks, benefits and alternatives were discussed  Consent given by: patient  Patient understanding: patient states understanding of the procedure being performed  Indications: pain   Body area: hip  Joint: left hip  Local anesthesia used: yes    Anesthesia:  Local anesthesia used: yes  Local Anesthetic: lidocaine 1% without epinephrine and topical anesthetic  Anesthetic total: 1 mL    Sedation:  Patient sedated: no    Preparation: Patient was prepped and draped in the usual sterile fashion.  Needle  size: 22 G  Approach: lateral  Patient tolerance: Patient tolerated the procedure well with no immediate complications  Comments: Successful depo medrol 80 mg injection into left trochanteric bursa area.        Assessment/Plan   Allyson was seen today for pain.    Diagnoses and all orders for this visit:    Trochanteric bursitis of both hips  -     Arthrocentesis  -     methylPREDNISolone acetate (DEPO-medrol) injection 80 mg    Other orders  -     Cancel: Cryotherapy, Skin Lesion      There are no Patient Instructions on file for this visit.    There are no discontinued medications.     Return in about 2 weeks (around 7/5/2019).    Dr. Dav Palacios  [place], [place]

## 2019-06-26 ENCOUNTER — PROCEDURE VISIT (OUTPATIENT)
Dept: FAMILY MEDICINE CLINIC | Facility: CLINIC | Age: 59
End: 2019-06-26

## 2019-06-26 VITALS
BODY MASS INDEX: 22.26 KG/M2 | DIASTOLIC BLOOD PRESSURE: 84 MMHG | HEART RATE: 72 BPM | OXYGEN SATURATION: 98 % | HEIGHT: 64 IN | SYSTOLIC BLOOD PRESSURE: 130 MMHG | WEIGHT: 130.4 LBS

## 2019-06-26 DIAGNOSIS — M70.62 TROCHANTERIC BURSITIS OF BOTH HIPS: Primary | ICD-10-CM

## 2019-06-26 DIAGNOSIS — M70.61 TROCHANTERIC BURSITIS OF BOTH HIPS: Primary | ICD-10-CM

## 2019-06-26 PROBLEM — M46.1 SI (SACROILIAC) JOINT INFLAMMATION: Status: ACTIVE | Noted: 2019-06-26

## 2019-06-26 PROCEDURE — 20610 DRAIN/INJ JOINT/BURSA W/O US: CPT | Performed by: FAMILY MEDICINE

## 2019-06-26 NOTE — PROGRESS NOTES
Subjective   Allyson Wilder is a 58 y.o. female who is here for   Chief Complaint   Patient presents with   • Hip Pain   .     History of Present Illness   Left trochanteric bursa injection helped last week  Here for the right    Still with some pyriformis SI area pain    Also may have some iliotibial band pain on both sides.      The following portions of the patient's history were reviewed and updated as appropriate: allergies, current medications, past medical history, past social history, past surgical history and problem list.    Review of Systems    Objective     Physical Exam   Musculoskeletal:        Right hip: She exhibits bony tenderness.     Arthrocentesis  Date/Time: 6/26/2019 2:05 PM  Performed by: Dav Palacios MD  Authorized by: Dav Palacios MD   Consent: Verbal consent obtained.  Risks and benefits: risks, benefits and alternatives were discussed  Consent given by: patient  Patient understanding: patient states understanding of the procedure being performed  Patient identity confirmed: verbally with patient  Indications: pain   Body area: hip  Joint: right hip  Local anesthesia used: yes    Anesthesia:  Local anesthesia used: yes  Local Anesthetic: lidocaine 1% without epinephrine and topical anesthetic  Anesthetic total: 1 mL    Sedation:  Patient sedated: no    Preparation: Patient was prepped and draped in the usual sterile fashion.  Needle size: 22 G  Ultrasound guidance: no  Approach: lateral  Patient tolerance: Patient tolerated the procedure well with no immediate complications  Comments: 80 mg depo medrol near the right trochanteric bursa area.          Assessment/Plan   Allyson was seen today for hip pain.    Diagnoses and all orders for this visit:    Trochanteric bursitis of both hips    Other orders  -     Arthrocentesis    if SI area still hurts may need to see PT    There are no Patient Instructions on file for this visit.    There are no discontinued medications.     No  Follow-up on file.    Dr. Dav Palacios  [place], [place]

## 2019-07-01 ENCOUNTER — TELEPHONE (OUTPATIENT)
Dept: FAMILY MEDICINE CLINIC | Facility: CLINIC | Age: 59
End: 2019-07-01

## 2019-07-01 DIAGNOSIS — M99.79 NARROWING OF INTERVERTEBRAL DISC SPACE: ICD-10-CM

## 2019-07-01 DIAGNOSIS — M70.61 TROCHANTERIC BURSITIS OF BOTH HIPS: Primary | ICD-10-CM

## 2019-07-01 DIAGNOSIS — M70.62 TROCHANTERIC BURSITIS OF BOTH HIPS: Primary | ICD-10-CM

## 2019-07-01 DIAGNOSIS — M46.1 SI (SACROILIAC) JOINT INFLAMMATION (HCC): ICD-10-CM

## 2019-07-01 NOTE — TELEPHONE ENCOUNTER
Pt called and said that she is calling regarding F/U from her last appt. She would like you to set up PT like you had suggested.     Ok PT referral made.    Dav Palacios MD

## 2019-07-08 ENCOUNTER — HOSPITAL ENCOUNTER (OUTPATIENT)
Dept: PHYSICAL THERAPY | Facility: HOSPITAL | Age: 59
Setting detail: THERAPIES SERIES
Discharge: HOME | End: 2019-07-08

## 2019-07-08 DIAGNOSIS — M70.61 TROCHANTERIC BURSITIS OF BOTH HIPS: Primary | ICD-10-CM

## 2019-07-08 DIAGNOSIS — M70.62 TROCHANTERIC BURSITIS OF BOTH HIPS: Primary | ICD-10-CM

## 2019-07-08 PROCEDURE — 97161 PT EVAL LOW COMPLEX 20 MIN: CPT | Performed by: PHYSICAL THERAPIST

## 2019-07-08 NOTE — THERAPY EVALUATION
Outpatient Physical Therapy Ortho Initial Evaluation  SANDRA Hanks     Patient Name: Allyson Wilder  : 1960  MRN: [id number]  Today's Date: 2019      Visit Date: 2019    Patient Active Problem List   Diagnosis   • Bursitis of hip   • Narrowing of intervertebral disc space   • Elevated hemoglobin A1c   • Headache   • Menopausal flushing   • Insomnia   • Migraine   • Celiac disease   • Health care maintenance   • Hyperglycemia   • Lymphocytic colitis   • Du's esophagus determined by biopsy   • Postmenopausal HRT (hormone replacement therapy)   • Trochanteric bursitis of both hips   • SI (sacroiliac) joint inflammation (CMS/HCC)        Past Medical History:   Diagnosis Date   • Bursitis of hip    • Celiac disease    • Heart murmur     FUNCTIONAL   • Insomnia    • Migraine    • Postmenopausal HRT (hormone replacement therapy)         Past Surgical History:   Procedure Laterality Date   • COLONOSCOPY     • ESOPHAGOSCOPY / EGD         Visit Dx:     ICD-10-CM ICD-9-CM   1. Trochanteric bursitis of both hips M70.61 726.5    M70.62          Patient History     Row Name 19 1100             History    Chief Complaint  Difficulty Walking;Difficulty with daily activities;Pain  -      Type of Pain  Hip pain bilateral  -      Brief Description of Current Complaint  Pt reports an approximately 5-6 week history of bilateral hip pain. She states the pain started in the left hip/buttock and then spread to the right. She was seen by Dr. Palacios who has injected both hips at this stage. She reports the injections helped decrease the pain in both hips for a brief  period of time. She is now referred for therapy.  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  Pt has received an injection in each hip  -         Pain     Pain Location  Hip;Buttocks bilateral  -GC      Pain at Present  7  -GC      Pain at Best  0 no pain at rest at times  -GC      Pain at Worst  10  -GC      Pain Frequency  Intermittent  -GC      Pain Description  Aching;Discomfort;Sore;Tender  -      What Performance Factors Make the Current Problem(s) WORSE?  Pt c/o pain with bending forward, lifting, and at certain times of day, i.e. morning is worse than afternoon  -GC      What Performance Factors Make the Current Problem(s) BETTER?  Pt feels better if she gets off her feet and rests, can be pain-free at rest at times  -      Difficulties with ADL's?  Pt has difficulty don/doff shoes and socks at times and with light household chores  -      Difficulties with recreational activities?  Pt has some pain with walking, yoga, and working out at the gym  -         Daily Activities    Primary Language  English  -GC      Are you able to read  Yes  -GC      Are you able to write  Yes  -GC      How does patient learn best?  Listening  -      Teaching needs identified  Home Exercise Program;Management of Condition  -      Patient is concerned about/has problems with  Climbing Stairs;Difficulty with self care (i.e. bathing, dressing, toileting:;Flexibility;Performing home management (household chores, shopping, care of dependents);Performing job responsibilities/community activities (work, school,;Performing sports, recreation, and play activities;Standing;Walking  -      Does patient have problems with the following?  None  -GC      Barriers to learning  None  -      Functional Status  mobility issues preventing performance of daily activities  -      Pt Participated in POC and Goals  Yes  -GC         Safety    Are you being hurt, hit, or frightened by anyone at home  or in your life?  No  -GC      Are you being neglected by a caregiver  No  -GC        User Key  (r) = Recorded By, (t) = Taken By, (c) = Cosigned By    Initials Name Provider Type    GC Filiberto Schultz, PT Physical Therapist          PT Ortho     Row Name 07/08/19 1100       Hip/Thigh Palpation    Greater Trochanter  Bilateral:;Tender  -GC    Gluteus Neo  Bilateral:;Tender;Guarded/taut  -GC    Gluteus Medius  Bilateral:;Tender;Guarded/taut  -GC    Piriformis  Bilateral:;Tender;Guarded/taut  -GC       Hip Special Tests    DARÍO (hip vs SI pathology)  Bilateral:;Negative  -GC    Jasiel test (tightness of ITB)  Bilateral:;Negative  -GC    Angus’s test (tightness of ITB)  Bilateral:;Negative  -GC    Ely’s test (rectus femoris tightness)  Bilateral:;Negative  -GC    Hip scour test (labral vs hip pathology)  Bilateral:;Negative  -GC       General ROM    GENERAL ROM COMMENTS  bilateral LE AROM is WFL all planes bilaterally  -GC       MMT Right Lower Ext    Rt Hip Flexion MMT, Gross Movement  (4+/5) good plus  -GC    Rt Hip Extension MMT, Gross Movement  (4+/5) good plus  -GC    Rt Hip ABduction MMT, Gross Movement  (4/5) good  -GC    Rt Hip ADduction MMT, Gross Movement  (4+/5) good plus  -GC    Rt Hip Internal (Medial) Rotation MMT, Gross Movement  (4/5) good  -GC    Rt Hip External (Lateral) Rotation MMT, Gross Movement  (4/5) good  -GC    Rt Knee Extension MMT, Gross Movement  (5/5) normal  -GC    Rt Knee Flexion MMT, Gross Movement  (5/5) normal  -GC       MMT Left Lower Ext    Lt Hip Flexion MMT, Gross Movement  (4/5) good  -GC    Lt Hip Extension MMT, Gross Movement  (4+/5) good plus  -GC    Lt Hip ABduction MMT, Gross Movement  (4/5) good  -GC    Lt Hip ADduction MMT, Gross Movement  (4+/5) good plus  -GC    Lt Hip Internal (Medial) Rotation MMT, Gross Movement  (4/5) good  -GC    Lt Hip External (Lateral) Rotation MMT, Gross Movement  (4/5) good  -GC    Lt Knee Extension MMT, Gross Movement  (5/5) normal  -GC     Lt Knee Flexion MMT, Gross Movement  (5/5) normal  -GC       Sensation    Light Touch  Partial deficits in the RUE  -GC       Lower Extremity Flexibility    Hamstrings  Bilateral:;Mildly limited  -GC    Hip Flexors  Bilateral:;WNL  -GC    Quadriceps  Bilateral:;WNL  -GC    ITB  Bilateral:;WNL  -GC    Hip External Rotators  Bilateral:;Moderately limited  -GC    Hip Internal Rotators  Bilateral:;Moderately limited  -GC       Transfers    Comment (Transfers)  Pt is independent with all bed mobility and transfers, but oh shave pain going sit to/from supine  -       Gait/Stairs Assessment/Training    Comment (Gait/Stairs)  Pt ambualtes normally on level surfaces  -      User Key  (r) = Recorded By, (t) = Taken By, (c) = Cosigned By    Initials Name Provider Type    Filiberto Caldera, ESTRELLITA Physical Therapist                      Therapy Education  Given: HEP, Symptoms/condition management, Pain management  Program: New  How Provided: Verbal, Demonstration, Written  Provided to: Patient  Level of Understanding: Teach back education performed, Verbalized, Demonstrated     PT OP Goals     Row Name 07/08/19 1100          PT Short Term Goals    STG Date to Achieve  07/22/19  -     STG 1  Decrease bilateral hip pain to 3-4/10 with activity.  -     STG 2  Increase hamstring flexibility to WFL with testing.  -     STG 3  Increase piriformis flexibility to only a minimal restriction with testing.  -     STG 4  Increase bilateral hip strength to at least 4+/5 all planes with testing.  -     STG 5  Pt will be independent with her HEP issued by this therapist.  -        Long Term Goals    LTG Date to Achieve  08/05/19  -     LTG 1  Decrease bilateral hip pain to 0-1/10 with activity.  -     LTG 2  Increase piriformis flexibility to WFL with testing.  -     LTG 3  Increase bilateral hip strength to 5/5 all planes with testing.  -     LTG 4  Pt will be independetn with all ADLs and have a LEFS score > 65.  -         Time Calculation    PT Goal Re-Cert Due Date  08/05/19  -GC       User Key  (r) = Recorded By, (t) = Taken By, (c) = Cosigned By    Initials Name Provider Type     Filiberto Schultz, PT Physical Therapist          PT Assessment/Plan     Row Name 07/08/19 1100          PT Assessment    Functional Limitations  Impaired gait;Limitation in home management;Limitations in community activities;Limitations in functional capacity and performance;Performance in leisure activities;Performance in self-care ADL  -GC     Impairments  Gait;Impaired flexibility;Pain;Muscle strength  -GC     Assessment Comments  Pt presents with an approximate 5-6 week history of bilateral hip pain that she rates up to 10/10 at times with activites sucha as bending forward and lifting tasks. She has decreased hamstring and piriformis flexibility, decreased hip strength, and decreased function secondary to the above.  -     Please refer to paper survey for additional self-reported information  Yes  -GC     Rehab Potential  Good  -GC     Patient/caregiver participated in establishment of treatment plan and goals  Yes  -GC     Patient would benefit from skilled therapy intervention  Yes  -GC        PT Plan    PT Frequency  2x/week;3x/week  -GC     Predicted Duration of Therapy Intervention (Therapy Eval)  4 weeks  -GC     Planned CPT's?  PT EVAL LOW COMPLEXITY: 24514;PT THER PROC EA 15 MIN: 21883;PT ULTRASOUND EA 15 MIN: 55769;PT IONTOPHORESIS EA 15 MIN: 00141  -GC     PT Plan Comments  Pt is to continue her HEP 2x daily.  -GC       User Key  (r) = Recorded By, (t) = Taken By, (c) = Cosigned By    Initials Name Provider Type     Filiberto Schultz, PT Physical Therapist          Modalities     Row Name 07/08/19 1100             Iontophoresis 45746    Milliamps  -- 1.4 left hip, 0.5 right hip  -GC      MA/Min  40  -GC      Dexamethasone used  Yes  -GC      Patch Type  Medium  -GC      49938 - PT Iontophoresis Minutes  25  -GC        User Key  (r) =  Recorded By, (t) = Taken By, (c) = Cosigned By    Initials Name Provider Type     Filiberto Schultz, PT Physical Therapist        Exercises     Row Name 07/08/19 1100             Exercise 1    Exercise Name 1  Hamstring stretch with ADD-bilateral  -GC      Cueing 1  Verbal;Tactile  -GC      Reps 1  10  -GC      Time 1  10 secs  -GC         Exercise 2    Exercise Name 2  Piriformis stretch-bilateral  -GC      Cueing 2  Verbal;Tactile  -GC      Reps 2  10  -GC      Time 2  10 secs  -GC         Exercise 3    Exercise Name 3  Supine Clam Shell vs theraband-bilateral  -GC      Cueing 3  Verbal;Tactile  -GC      Reps 3  25  -GC      Time 3  black  -GC         Exercise 4    Exercise Name 4  Hip ER vs theraband  -GC      Cueing 4  Verbal;Demo  -GC      Reps 4  25  -GC      Time 4  black  -GC         Exercise 5    Exercise Name 5  Hip IR vs theraband  -GC      Cueing 5  Verbal;Demo  -GC      Reps 5  25  -GC      Time 5  black  -GC        User Key  (r) = Recorded By, (t) = Taken By, (c) = Cosigned By    Initials Name Provider Type     Filiberto Schultz, PT Physical Therapist                        Outcome Measure Options: Lower Extremity Functional Scale (LEFS)         Time Calculation:     Start Time: 1100  Stop Time: 1203  Time Calculation (min): 63 min     Therapy Charges for Today     Code Description Service Date Service Provider Modifiers Qty    29075136741  PT EVAL LOW COMPLEXITY 2 7/8/2019 Filiberto Schultz, PT GP 1          PT G-Codes  Outcome Measure Options: Lower Extremity Functional Scale (LEFS)         Filiberto Schultz PT  7/8/2019

## 2019-07-10 ENCOUNTER — HOSPITAL ENCOUNTER (OUTPATIENT)
Dept: PHYSICAL THERAPY | Facility: HOSPITAL | Age: 59
Setting detail: THERAPIES SERIES
Discharge: HOME | End: 2019-07-10

## 2019-07-10 DIAGNOSIS — M70.61 TROCHANTERIC BURSITIS OF BOTH HIPS: Primary | ICD-10-CM

## 2019-07-10 DIAGNOSIS — M70.62 TROCHANTERIC BURSITIS OF BOTH HIPS: Primary | ICD-10-CM

## 2019-07-10 PROCEDURE — 97110 THERAPEUTIC EXERCISES: CPT | Performed by: PHYSICAL THERAPIST

## 2019-07-10 PROCEDURE — 97033 APP MDLTY 1+IONTPHRSIS EA 15: CPT | Performed by: PHYSICAL THERAPIST

## 2019-07-10 NOTE — THERAPY TREATMENT NOTE
Outpatient Physical Therapy Ortho Treatment Note  SANDRA Hanks     Patient Name: Allyson Wilder  : 1960  MRN: [id number]  Today's Date: 7/10/2019      Visit Date: 07/10/2019    Visit Dx:    ICD-10-CM ICD-9-CM   1. Trochanteric bursitis of both hips M70.61 726.5    M70.62        Patient Active Problem List   Diagnosis   • Bursitis of hip   • Narrowing of intervertebral disc space   • Elevated hemoglobin A1c   • Headache   • Menopausal flushing   • Insomnia   • Migraine   • Celiac disease   • Health care maintenance   • Hyperglycemia   • Lymphocytic colitis   • Du's esophagus determined by biopsy   • Postmenopausal HRT (hormone replacement therapy)   • Trochanteric bursitis of both hips   • SI (sacroiliac) joint inflammation (CMS/HCC)        Past Medical History:   Diagnosis Date   • Bursitis of hip    • Celiac disease    • Heart murmur     FUNCTIONAL   • Insomnia    • Migraine    • Postmenopausal HRT (hormone replacement therapy)         Past Surgical History:   Procedure Laterality Date   • COLONOSCOPY     • ESOPHAGOSCOPY / EGD         PT Ortho     Row Name 19 1100       Hip/Thigh Palpation    Greater Trochanter  Bilateral:;Tender  -GC    Gluteus Neo  Bilateral:;Tender;Guarded/taut  -GC    Gluteus Medius  Bilateral:;Tender;Guarded/taut  -GC    Piriformis  Bilateral:;Tender;Guarded/taut  -GC       Hip Special Tests    DARÍO (hip vs SI pathology)  Bilateral:;Negative  -GC    Jasiel test (tightness of ITB)  Bilateral:;Negative  -GC    Angus’s test (tightness of ITB)  Bilateral:;Negative  -GC    Ely’s test (rectus femoris tightness)  Bilateral:;Negative  -GC    Hip scour test (labral vs hip pathology)  Bilateral:;Negative  -GC       General ROM    GENERAL ROM COMMENTS  bilateral LE AROM is WFL all planes bilaterally  -GC       MMT Right Lower Ext    Rt Hip Flexion MMT, Gross Movement  (4+/5) good plus  -GC    Rt Hip Extension MMT, Gross Movement  (4+/5) good plus  -GC    Rt Hip ABduction MMT,  Gross Movement  (4/5) good  -GC    Rt Hip ADduction MMT, Gross Movement  (4+/5) good plus  -GC    Rt Hip Internal (Medial) Rotation MMT, Gross Movement  (4/5) good  -GC    Rt Hip External (Lateral) Rotation MMT, Gross Movement  (4/5) good  -GC    Rt Knee Extension MMT, Gross Movement  (5/5) normal  -GC    Rt Knee Flexion MMT, Gross Movement  (5/5) normal  -GC       MMT Left Lower Ext    Lt Hip Flexion MMT, Gross Movement  (4/5) good  -GC    Lt Hip Extension MMT, Gross Movement  (4+/5) good plus  -GC    Lt Hip ABduction MMT, Gross Movement  (4/5) good  -GC    Lt Hip ADduction MMT, Gross Movement  (4+/5) good plus  -GC    Lt Hip Internal (Medial) Rotation MMT, Gross Movement  (4/5) good  -GC    Lt Hip External (Lateral) Rotation MMT, Gross Movement  (4/5) good  -GC    Lt Knee Extension MMT, Gross Movement  (5/5) normal  -GC    Lt Knee Flexion MMT, Gross Movement  (5/5) normal  -GC       Sensation    Light Touch  Partial deficits in the RUE  -GC       Lower Extremity Flexibility    Hamstrings  Bilateral:;Mildly limited  -GC    Hip Flexors  Bilateral:;WNL  -GC    Quadriceps  Bilateral:;WNL  -GC    ITB  Bilateral:;WNL  -GC    Hip External Rotators  Bilateral:;Moderately limited  -GC    Hip Internal Rotators  Bilateral:;Moderately limited  -GC       Transfers    Comment (Transfers)  Pt is independent with all bed mobility and transfers, but oh shave pain going sit to/from supine  -GC       Gait/Stairs Assessment/Training    Comment (Gait/Stairs)  Pt ambualtes normally on level surfaces  -GC      User Key  (r) = Recorded By, (t) = Taken By, (c) = Cosigned By    Initials Name Provider Type     Filiberto Schultz, PT Physical Therapist                      PT Assessment/Plan     Row Name 07/10/19 5852          PT Assessment    Assessment Comments  Pt noted to have increased pain left hip and decreased pain right hip today. She seemed to tolerate decreased resistance well today.  -GC        PT Plan    PT Plan Comments  Pt is  to continue her HEP 2x daily. Will re-ck again 7/15/2019.  -GC       User Key  (r) = Recorded By, (t) = Taken By, (c) = Cosigned By    Initials Name Provider Type    GC Filiberto Schultz, PT Physical Therapist          Modalities     Row Name 07/10/19 0710             Iontophoresis 70996    Milliamps  -- 0.7 left hip, 1.4 right hip  -GC      MA/Min  40  -GC      Dexamethasone used  Yes  -GC      Patch Type  Medium  -GC      69874 - PT Iontophoresis Minutes  40  -GC        User Key  (r) = Recorded By, (t) = Taken By, (c) = Cosigned By    Initials Name Provider Type    GC Filiberto Schultz, PT Physical Therapist        Exercises     Row Name 07/10/19 0710             Subjective Comments    Subjective Comments  Pt c/o increased left hip pain this morning adn she says she is having difficulty moving.  -GC         Exercise 1    Exercise Name 1  Hamstring stretch with ADD-bilateral  -GC      Cueing 1  Verbal;Tactile  -GC      Reps 1  10  -GC      Time 1  10 secs  -GC         Exercise 2    Exercise Name 2  Piriformis stretch-bilateral  -GC      Cueing 2  Verbal;Tactile  -GC      Reps 2  10  -GC      Time 2  10 secs  -GC         Exercise 3    Exercise Name 3  Supine Clam Shell vs theraband-bilateral  -GC      Cueing 3  Verbal;Tactile  -GC      Reps 3  25  -GC      Time 3  green  -GC         Exercise 4    Exercise Name 4  Hip ER vs theraband  -GC      Cueing 4  Verbal;Demo  -GC      Reps 4  25  -GC      Time 4  green  -GC         Exercise 5    Exercise Name 5  Hip IR vs theraband  -GC      Cueing 5  Verbal;Demo  -GC      Reps 5  25  -GC      Time 5  green  -GC         Exercise 6    Exercise Name 6  LLNT mobilzations for 3 biases left LE  -GC      Time 6  3 min  -GC        User Key  (r) = Recorded By, (t) = Taken By, (c) = Cosigned By    Initials Name Provider Type    GC Filiberto Schultz, PT Physical Therapist                                          Time Calculation:   Start Time: 0710  Stop Time: 0824  Time Calculation (min): 74  min  Therapy Charges for Today     Code Description Service Date Service Provider Modifiers Qty    73105458897 HC PT IONTOPHORESIS EA 15 MIN 7/10/2019 Filiberto Schultz, PT GP 1    98350861483 HC PT THER PROC EA 15 MIN 7/10/2019 Filiberto Schultz, PT GP 1                    Filiberto Schultz, PT  7/10/2019

## 2019-07-15 ENCOUNTER — HOSPITAL ENCOUNTER (OUTPATIENT)
Dept: PHYSICAL THERAPY | Facility: HOSPITAL | Age: 59
Setting detail: THERAPIES SERIES
Discharge: HOME | End: 2019-07-15

## 2019-07-15 DIAGNOSIS — M70.62 TROCHANTERIC BURSITIS OF BOTH HIPS: Primary | ICD-10-CM

## 2019-07-15 DIAGNOSIS — M70.61 TROCHANTERIC BURSITIS OF BOTH HIPS: Primary | ICD-10-CM

## 2019-07-15 PROCEDURE — 97033 APP MDLTY 1+IONTPHRSIS EA 15: CPT | Performed by: PHYSICAL THERAPIST

## 2019-07-15 PROCEDURE — 97110 THERAPEUTIC EXERCISES: CPT | Performed by: PHYSICAL THERAPIST

## 2019-07-15 NOTE — THERAPY TREATMENT NOTE
Outpatient Physical Therapy Ortho Treatment Note  SANDRA Hanks     Patient Name: Allyson Wilder  : 1960  MRN: [id number]  Today's Date: 7/15/2019      Visit Date: 07/15/2019    Visit Dx:    ICD-10-CM ICD-9-CM   1. Trochanteric bursitis of both hips M70.61 726.5    M70.62        Patient Active Problem List   Diagnosis   • Bursitis of hip   • Narrowing of intervertebral disc space   • Elevated hemoglobin A1c   • Headache   • Menopausal flushing   • Insomnia   • Migraine   • Celiac disease   • Health care maintenance   • Hyperglycemia   • Lymphocytic colitis   • Du's esophagus determined by biopsy   • Postmenopausal HRT (hormone replacement therapy)   • Trochanteric bursitis of both hips   • SI (sacroiliac) joint inflammation (CMS/HCC)        Past Medical History:   Diagnosis Date   • Bursitis of hip    • Celiac disease    • Heart murmur     FUNCTIONAL   • Insomnia    • Migraine    • Postmenopausal HRT (hormone replacement therapy)         Past Surgical History:   Procedure Laterality Date   • COLONOSCOPY     • ESOPHAGOSCOPY / EGD                         PT Assessment/Plan     Row Name 07/15/19 0990          PT Assessment    Assessment Comments  Pt is doing well with decreased c/o pain and improving function. She tolerated increased resistance with her exercises well.  -GC        PT Plan    PT Plan Comments  Pt is to continue her HEP daily.  -GC       User Key  (r) = Recorded By, (t) = Taken By, (c) = Cosigned By    Initials Name Provider Type    Filiberto Caldera, PT Physical Therapist          Modalities     Row Name 07/15/19 0950             Iontophoresis 81001    Milliamps  -- 0.5 left hip, 1.4 right hip and increased as time went on  -GC      MA/Min  40  -GC      Dexamethasone used  Yes  -GC      Patch Type  Medium  -GC      78493 - PT Iontophoresis Minutes  40  -GC        User Key  (r) = Recorded By, (t) = Taken By, (c) = Cosigned By    Initials Name Provider Type    Filiberto Caldera, PT Physical  Therapist        Exercises     Row Name 07/15/19 0950             Subjective Comments    Subjective Comments  Pt reports this is the best day she has had recently.  -GC         Exercise 1    Exercise Name 1  Hamstring stretch with ADD-bilateral  -GC      Cueing 1  Verbal;Tactile  -GC      Reps 1  10  -GC      Time 1  10 secs  -GC         Exercise 2    Exercise Name 2  Piriformis stretch-bilateral  -GC      Cueing 2  Verbal;Tactile  -GC      Reps 2  10  -GC      Time 2  10 secs  -GC         Exercise 3    Exercise Name 3  Supine Clam Shell vs theraband-bilateral  -GC      Cueing 3  Verbal;Tactile  -GC      Reps 3  25  -GC      Time 3  blue  -GC         Exercise 4    Exercise Name 4  Hip ER vs theraband  -GC      Cueing 4  Verbal;Demo  -GC      Reps 4  25  -GC      Time 4  blue  -GC         Exercise 5    Exercise Name 5  Hip IR vs theraband  -GC      Cueing 5  Verbal;Demo  -GC      Reps 5  25  -GC      Time 5  blue  -GC         Exercise 6    Exercise Name 6  LLNT mobilzations for 3 biases left LE  -GC      Time 6  3 min  -GC        User Key  (r) = Recorded By, (t) = Taken By, (c) = Cosigned By    Initials Name Provider Type    GC Filiberto Schultz, PT Physical Therapist                                          Time Calculation:   Start Time: 0950  Stop Time: 1052  Time Calculation (min): 62 min  Therapy Charges for Today     Code Description Service Date Service Provider Modifiers Qty    99228182334 HC PT IONTOPHORESIS EA 15 MIN 7/15/2019 Filiberto Schultz, PT GP 1    64418241135 HC PT THER PROC EA 15 MIN 7/15/2019 Filiberto Schultz, PT GP 1                    Filiberto Schultz PT  7/15/2019

## 2019-07-18 ENCOUNTER — HOSPITAL ENCOUNTER (OUTPATIENT)
Dept: PHYSICAL THERAPY | Facility: HOSPITAL | Age: 59
Setting detail: THERAPIES SERIES
Discharge: HOME | End: 2019-07-18

## 2019-07-18 DIAGNOSIS — M70.62 TROCHANTERIC BURSITIS OF BOTH HIPS: Primary | ICD-10-CM

## 2019-07-18 DIAGNOSIS — M70.61 TROCHANTERIC BURSITIS OF BOTH HIPS: Primary | ICD-10-CM

## 2019-07-18 PROCEDURE — 97033 APP MDLTY 1+IONTPHRSIS EA 15: CPT | Performed by: PHYSICAL THERAPIST

## 2019-07-18 PROCEDURE — 97110 THERAPEUTIC EXERCISES: CPT | Performed by: PHYSICAL THERAPIST

## 2019-07-18 NOTE — THERAPY TREATMENT NOTE
Outpatient Physical Therapy Ortho Treatment Note  SANDRA Hanks     Patient Name: Allyson Wilder  : 1960  MRN: [id number]  Today's Date: 2019      Visit Date: 2019    Visit Dx:    ICD-10-CM ICD-9-CM   1. Trochanteric bursitis of both hips M70.61 726.5    M70.62        Patient Active Problem List   Diagnosis   • Bursitis of hip   • Narrowing of intervertebral disc space   • Elevated hemoglobin A1c   • Headache   • Menopausal flushing   • Insomnia   • Migraine   • Celiac disease   • Health care maintenance   • Hyperglycemia   • Lymphocytic colitis   • Du's esophagus determined by biopsy   • Postmenopausal HRT (hormone replacement therapy)   • Trochanteric bursitis of both hips   • SI (sacroiliac) joint inflammation (CMS/HCC)        Past Medical History:   Diagnosis Date   • Bursitis of hip    • Celiac disease    • Heart murmur     FUNCTIONAL   • Insomnia    • Migraine    • Postmenopausal HRT (hormone replacement therapy)         Past Surgical History:   Procedure Laterality Date   • COLONOSCOPY     • ESOPHAGOSCOPY / EGD                         PT Assessment/Plan     Row Name 19 1000          PT Assessment    Assessment Comments  Pt is doing well with decreasd c/o pain and improving function.  -GC        PT Plan    PT Plan Comments  Pt is to continue her HEP daily.  -GC       User Key  (r) = Recorded By, (t) = Taken By, (c) = Cosigned By    Initials Name Provider Type    GC Filiberto Schultz, PT Physical Therapist          Modalities     Row Name 19 1000             Iontophoresis 48018    Milliamps  -- 0.5 left hip, 1.4 right hip and increased as time went on  -GC      MA/Min  40  -GC      Dexamethasone used  Yes  -GC      Patch Type  Medium  -GC      38778 - PT Iontophoresis Minutes  40  -GC        User Key  (r) = Recorded By, (t) = Taken By, (c) = Cosigned By    Initials Name Provider Type    Filiberto Caldera, PT Physical Therapist        Exercises     Row Name 19 1000              Subjective Comments    Subjective Comments  Pt states her pain is now an intermittent 4/10.  -GC         Exercise 1    Exercise Name 1  Hamstring stretch with ADD-bilateral  -GC      Cueing 1  Verbal;Tactile  -GC      Reps 1  10  -GC      Time 1  10 secs  -GC         Exercise 2    Exercise Name 2  Piriformis stretch-bilateral  -GC      Cueing 2  Verbal;Tactile  -GC      Reps 2  10  -GC      Time 2  10 secs  -GC         Exercise 3    Exercise Name 3  Supine Clam Shell vs theraband-bilateral  -GC      Cueing 3  Verbal;Tactile  -GC      Reps 3  25  -GC      Time 3  blue  -GC         Exercise 4    Exercise Name 4  Hip ER vs theraband  -GC      Cueing 4  Verbal;Demo  -GC      Reps 4  25  -GC      Time 4  blue  -GC         Exercise 5    Exercise Name 5  Hip IR vs theraband  -GC      Cueing 5  Verbal;Demo  -GC      Reps 5  25  -GC      Time 5  blue  -GC         Exercise 6    Exercise Name 6  LLNT mobilzations for 3 biases left LE  -GC      Time 6  3 min  -GC         Exercise 7    Exercise Name 7  Bridges vs theraband  -GC      Cueing 7  Verbal;Tactile  -GC      Reps 7  25  -GC      Time 7  blue  -GC        User Key  (r) = Recorded By, (t) = Taken By, (c) = Cosigned By    Initials Name Provider Type     Filiberto Schultz, PT Physical Therapist                                          Time Calculation:   Start Time: 1000  Stop Time: 1057  Time Calculation (min): 57 min  Therapy Charges for Today     Code Description Service Date Service Provider Modifiers Qty    05073161751 HC PT THER PROC EA 15 MIN 7/18/2019 Filiberto Schultz, PT GP 1    28159779953 HC PT IONTOPHORESIS EA 15 MIN 7/18/2019 Filiberto Schultz, PT GP 1                    Filiberto Schultz PT  7/18/2019

## 2019-07-22 ENCOUNTER — HOSPITAL ENCOUNTER (OUTPATIENT)
Dept: PHYSICAL THERAPY | Facility: HOSPITAL | Age: 59
Setting detail: THERAPIES SERIES
Discharge: HOME | End: 2019-07-22

## 2019-07-22 DIAGNOSIS — M70.62 TROCHANTERIC BURSITIS OF BOTH HIPS: Primary | ICD-10-CM

## 2019-07-22 DIAGNOSIS — M70.61 TROCHANTERIC BURSITIS OF BOTH HIPS: Primary | ICD-10-CM

## 2019-07-22 PROCEDURE — 97110 THERAPEUTIC EXERCISES: CPT | Performed by: PHYSICAL THERAPIST

## 2019-07-22 PROCEDURE — 97033 APP MDLTY 1+IONTPHRSIS EA 15: CPT | Performed by: PHYSICAL THERAPIST

## 2019-07-22 NOTE — THERAPY TREATMENT NOTE
Outpatient Physical Therapy Ortho Treatment Note  SANDRA Hanks     Patient Name: Allyson Wilder  : 1960  MRN: [id number]  Today's Date: 2019      Visit Date: 2019    Visit Dx:    ICD-10-CM ICD-9-CM   1. Trochanteric bursitis of both hips M70.61 726.5    M70.62        Patient Active Problem List   Diagnosis   • Bursitis of hip   • Narrowing of intervertebral disc space   • Elevated hemoglobin A1c   • Headache   • Menopausal flushing   • Insomnia   • Migraine   • Celiac disease   • Health care maintenance   • Hyperglycemia   • Lymphocytic colitis   • Du's esophagus determined by biopsy   • Postmenopausal HRT (hormone replacement therapy)   • Trochanteric bursitis of both hips   • SI (sacroiliac) joint inflammation (CMS/HCC)        Past Medical History:   Diagnosis Date   • Bursitis of hip    • Celiac disease    • Heart murmur     FUNCTIONAL   • Insomnia    • Migraine    • Postmenopausal HRT (hormone replacement therapy)         Past Surgical History:   Procedure Laterality Date   • COLONOSCOPY     • ESOPHAGOSCOPY / EGD         PT Ortho     Row Name 19 1115       Hip/Thigh Palpation    Greater Trochanter  Left:;Tender no tenderness right greater trochanter  -GC      User Key  (r) = Recorded By, (t) = Taken By, (c) = Cosigned By    Initials Name Provider Type    Filiberto Caldera, PT Physical Therapist                      PT Assessment/Plan     Row Name 19 1111          PT Assessment    Assessment Comments  Pt is doing well with decreasing c/o pain and improving function.  -GC        PT Plan    PT Plan Comments  Pt is to continue her HEP daily. Will re-ck again later this week.  -GC       User Key  (r) = Recorded By, (t) = Taken By, (c) = Cosigned By    Initials Name Provider Type    Filiberto Caldera, PT Physical Therapist          Modalities     Row Name 19 1115             Iontophoresis 45015    MA/Min  40  -GC      Dexamethasone used  Yes  -GC      Patch Type  Medium  left greater trochanter area  -GC      27239 - PT Iontophoresis Minutes  40  -GC        User Key  (r) = Recorded By, (t) = Taken By, (c) = Cosigned By    Initials Name Provider Type     Filiberto Schultz PT Physical Therapist        Exercises     Row Name 07/22/19 1115             Subjective Comments    Subjective Comments  Pt states her right hip is doing pretty well. The left is better also, but still painful. She says she gets sore after sexual relations.  -GC         Exercise 1    Exercise Name 1  Hamstring stretch with ADD-bilateral  -GC      Cueing 1  Verbal;Tactile  -GC      Reps 1  10  -GC      Time 1  10 secs  -GC         Exercise 2    Exercise Name 2  Piriformis stretch-bilateral  -GC      Cueing 2  Verbal;Tactile  -GC      Reps 2  10  -GC      Time 2  10 secs  -GC         Exercise 3    Exercise Name 3  Supine Clam Shell vs theraband-bilateral  -GC      Cueing 3  Verbal;Tactile  -GC      Reps 3  25  -GC      Time 3  black  -GC         Exercise 4    Exercise Name 4  Hip ER vs theraband  -GC      Cueing 4  Verbal;Demo  -GC      Reps 4  25  -GC      Time 4  black  -GC         Exercise 5    Exercise Name 5  Hip IR vs theraband  -GC      Cueing 5  Verbal;Demo  -GC      Reps 5  25  -GC      Time 5  black  -GC         Exercise 6    Exercise Name 6  LLNT mobilzations for 3 biases left LE  -GC      Time 6  3 min  -GC         Exercise 7    Exercise Name 7  Bridges vs theraband  -GC      Cueing 7  Verbal;Tactile  -GC      Reps 7  25  -GC      Time 7  black  -GC        User Key  (r) = Recorded By, (t) = Taken By, (c) = Cosigned By    Initials Name Provider Type     Filiberto Schultz PT Physical Therapist                                          Time Calculation:   Start Time: 1115  Stop Time: 1204  Time Calculation (min): 49 min  Therapy Charges for Today     Code Description Service Date Service Provider Modifiers Qty    33607269391 HC PT IONTOPHORESIS EA 15 MIN 7/22/2019 Filiberto Schultz, PT GP 1    [id number]  PT  THER PROC EA 15 MIN 7/22/2019 Filiberto Schultz, PT GP 1                    Filiberto Schultz, PT  7/22/2019

## 2019-07-22 NOTE — THERAPY TREATMENT NOTE
Outpatient Physical Therapy Ortho Treatment Note  SANDRA Hanks     Patient Name: Allyson Wilder  : 1960  MRN: [id number]  Today's Date: 2019      Visit Date: 2019    Visit Dx:    ICD-10-CM ICD-9-CM   1. Trochanteric bursitis of both hips M70.61 726.5    M70.62        Patient Active Problem List   Diagnosis   • Bursitis of hip   • Narrowing of intervertebral disc space   • Elevated hemoglobin A1c   • Headache   • Menopausal flushing   • Insomnia   • Migraine   • Celiac disease   • Health care maintenance   • Hyperglycemia   • Lymphocytic colitis   • Du's esophagus determined by biopsy   • Postmenopausal HRT (hormone replacement therapy)   • Trochanteric bursitis of both hips   • SI (sacroiliac) joint inflammation (CMS/HCC)        Past Medical History:   Diagnosis Date   • Bursitis of hip    • Celiac disease    • Heart murmur     FUNCTIONAL   • Insomnia    • Migraine    • Postmenopausal HRT (hormone replacement therapy)         Past Surgical History:   Procedure Laterality Date   • COLONOSCOPY     • ESOPHAGOSCOPY / EGD         PT Ortho     Row Name 19 1115       Hip/Thigh Palpation    Greater Trochanter  Left:;Tender no tenderness right greater trochanter  -GC      User Key  (r) = Recorded By, (t) = Taken By, (c) = Cosigned By    Initials Name Provider Type    Filiberto Caldera, PT Physical Therapist                      PT Assessment/Plan     Row Name 19 1114          PT Assessment    Assessment Comments  Pt is doing well with decreasing c/o pain and improving function.  -GC        PT Plan    PT Plan Comments  Pt is to continue her HEP daily. Will re-ck again later this week.  -GC       User Key  (r) = Recorded By, (t) = Taken By, (c) = Cosigned By    Initials Name Provider Type    Filiberto Caldera, PT Physical Therapist          Modalities     Row Name 19 1115             Iontophoresis 33596    MA/Min  40  -GC      Dexamethasone used  Yes  -GC      Patch Type  Medium  left greater trochanter area  -GC      21371 - PT Iontophoresis Minutes  40  -GC        User Key  (r) = Recorded By, (t) = Taken By, (c) = Cosigned By    Initials Name Provider Type     Filiberto Schultz PT Physical Therapist        Exercises     Row Name 07/22/19 1115             Subjective Comments    Subjective Comments  Pt states her right hip is doing pretty well. The left is better also, but still painful. She says she gets sore after sexual relations.  -GC         Exercise 1    Exercise Name 1  Hamstring stretch with ADD-bilateral  -GC      Cueing 1  Verbal;Tactile  -GC      Reps 1  10  -GC      Time 1  10 secs  -GC         Exercise 2    Exercise Name 2  Piriformis stretch-bilateral  -GC      Cueing 2  Verbal;Tactile  -GC      Reps 2  10  -GC      Time 2  10 secs  -GC         Exercise 3    Exercise Name 3  Supine Clam Shell vs theraband-bilateral  -GC      Cueing 3  Verbal;Tactile  -GC      Reps 3  25  -GC      Time 3  black  -GC         Exercise 4    Exercise Name 4  Hip ER vs theraband  -GC      Cueing 4  Verbal;Demo  -GC      Reps 4  25  -GC      Time 4  black  -GC         Exercise 5    Exercise Name 5  Hip IR vs theraband  -GC      Cueing 5  Verbal;Demo  -GC      Reps 5  25  -GC      Time 5  black  -GC         Exercise 6    Exercise Name 6  LLNT mobilzations for 3 biases left LE  -GC      Time 6  3 min  -GC         Exercise 7    Exercise Name 7  Bridges vs theraband  -GC      Cueing 7  Verbal;Tactile  -GC      Reps 7  25  -GC      Time 7  black  -GC        User Key  (r) = Recorded By, (t) = Taken By, (c) = Cosigned By    Initials Name Provider Type     Filiberto Schultz PT Physical Therapist                                          Time Calculation:   Start Time: 1115  Stop Time: 1204  Time Calculation (min): 49 min  Therapy Charges for Today     Code Description Service Date Service Provider Modifiers Qty    52262080470 HC PT IONTOPHORESIS EA 15 MIN 7/22/2019 Filiberto Schultz, PT GP 1    [id number]  PT  THER PROC EA 15 MIN 7/22/2019 Filiberto Schultz, PT GP 1                    Filiberto Schultz, PT  7/22/2019

## 2019-07-25 ENCOUNTER — HOSPITAL ENCOUNTER (OUTPATIENT)
Dept: PHYSICAL THERAPY | Facility: HOSPITAL | Age: 59
Setting detail: THERAPIES SERIES
Discharge: HOME | End: 2019-07-25

## 2019-07-25 DIAGNOSIS — M70.62 TROCHANTERIC BURSITIS OF BOTH HIPS: Primary | ICD-10-CM

## 2019-07-25 DIAGNOSIS — M70.61 TROCHANTERIC BURSITIS OF BOTH HIPS: Primary | ICD-10-CM

## 2019-07-25 PROCEDURE — 97110 THERAPEUTIC EXERCISES: CPT | Performed by: PHYSICAL THERAPIST

## 2019-07-25 NOTE — THERAPY TREATMENT NOTE
Outpatient Physical Therapy Ortho Treatment Note  SANDRA Hanks     Patient Name: Allyson Wilder  : 1960  MRN: [id number]  Today's Date: 2019      Visit Date: 2019    Visit Dx:    ICD-10-CM ICD-9-CM   1. Trochanteric bursitis of both hips M70.61 726.5    M70.62        Patient Active Problem List   Diagnosis   • Bursitis of hip   • Narrowing of intervertebral disc space   • Elevated hemoglobin A1c   • Headache   • Menopausal flushing   • Insomnia   • Migraine   • Celiac disease   • Health care maintenance   • Hyperglycemia   • Lymphocytic colitis   • Du's esophagus determined by biopsy   • Postmenopausal HRT (hormone replacement therapy)   • Trochanteric bursitis of both hips   • SI (sacroiliac) joint inflammation (CMS/HCC)        Past Medical History:   Diagnosis Date   • Bursitis of hip    • Celiac disease    • Heart murmur     FUNCTIONAL   • Insomnia    • Migraine    • Postmenopausal HRT (hormone replacement therapy)         Past Surgical History:   Procedure Laterality Date   • COLONOSCOPY     • ESOPHAGOSCOPY / EGD         PT Ortho     Row Name 19 1015       Subjective Comments    Subjective Comments  Pt states her hips are feeling better.  -      User Key  (r) = Recorded By, (t) = Taken By, (c) = Cosigned By    Initials Name Provider Type    Filiberto Caldera, PT Physical Therapist                      PT Assessment/Plan     Row Name 19 1015          PT Assessment    Assessment Comments  No ionto today due to decreased pain and tenderness over trochanters.  -        PT Plan    PT Plan Comments  Pt is to continue her HEP daily. Will re-ck again early next week.  -       User Key  (r) = Recorded By, (t) = Taken By, (c) = Cosigned By    Initials Name Provider Type    Filiberto Caldera, PT Physical Therapist            Exercises     Row Name 19 1015             Subjective Comments    Subjective Comments  Pt states her hips are feeling better.  -         Exercise 1     Exercise Name 1  Hamstring stretch with ADD-bilateral  -GC      Cueing 1  Verbal;Tactile  -GC      Reps 1  10  -GC      Time 1  10 secs  -GC         Exercise 2    Exercise Name 2  Piriformis stretch-bilateral  -GC      Cueing 2  Verbal;Tactile  -GC      Reps 2  10  -GC      Time 2  10 secs  -GC         Exercise 3    Exercise Name 3  Supine Clam Shell vs theraband-bilateral  -GC      Cueing 3  Verbal;Tactile  -GC      Reps 3  25  -GC      Time 3  black  -GC         Exercise 4    Exercise Name 4  Hip ER vs theraband  -GC      Cueing 4  Verbal;Demo  -GC      Reps 4  25  -GC      Time 4  black  -GC         Exercise 5    Exercise Name 5  Hip IR vs theraband  -GC      Cueing 5  Verbal;Demo  -GC      Reps 5  25  -GC      Time 5  black  -GC         Exercise 6    Exercise Name 6  LLNT mobilzations for 3 biases left LE  -GC      Time 6  3 min  -GC         Exercise 7    Exercise Name 7  Bridges vs theraband  -GC      Cueing 7  Verbal;Tactile  -GC      Reps 7  25  -GC      Time 7  black  -GC        User Key  (r) = Recorded By, (t) = Taken By, (c) = Cosigned By    Initials Name Provider Type     Filiberto Schultz, PT Physical Therapist                                          Time Calculation:   Start Time: 1015  Stop Time: 1041  Time Calculation (min): 26 min  Therapy Charges for Today     Code Description Service Date Service Provider Modifiers Qty    23904743962  PT THER PROC EA 15 MIN 7/25/2019 Filiberto Schultz, PT GP 1                    Filiberto Schultz, PT  7/25/2019

## 2019-07-30 ENCOUNTER — HOSPITAL ENCOUNTER (OUTPATIENT)
Dept: PHYSICAL THERAPY | Facility: HOSPITAL | Age: 59
Setting detail: THERAPIES SERIES
Discharge: HOME | End: 2019-07-30

## 2019-07-30 DIAGNOSIS — M70.61 TROCHANTERIC BURSITIS OF BOTH HIPS: Primary | ICD-10-CM

## 2019-07-30 DIAGNOSIS — M70.62 TROCHANTERIC BURSITIS OF BOTH HIPS: Primary | ICD-10-CM

## 2019-07-30 PROCEDURE — 97110 THERAPEUTIC EXERCISES: CPT | Performed by: PHYSICAL THERAPIST

## 2019-07-30 PROCEDURE — 97033 APP MDLTY 1+IONTPHRSIS EA 15: CPT | Performed by: PHYSICAL THERAPIST

## 2019-07-30 NOTE — THERAPY TREATMENT NOTE
Outpatient Physical Therapy Ortho Treatment Note  SANDRA Hanks     Patient Name: Allyson Wilder  : 1960  MRN: [id number]  Today's Date: 2019      Visit Date: 2019    Visit Dx:    ICD-10-CM ICD-9-CM   1. Trochanteric bursitis of both hips M70.61 726.5    M70.62        Patient Active Problem List   Diagnosis   • Bursitis of hip   • Narrowing of intervertebral disc space   • Elevated hemoglobin A1c   • Headache   • Menopausal flushing   • Insomnia   • Migraine   • Celiac disease   • Health care maintenance   • Hyperglycemia   • Lymphocytic colitis   • Du's esophagus determined by biopsy   • Postmenopausal HRT (hormone replacement therapy)   • Trochanteric bursitis of both hips   • SI (sacroiliac) joint inflammation (CMS/HCC)        Past Medical History:   Diagnosis Date   • Bursitis of hip    • Celiac disease    • Heart murmur     FUNCTIONAL   • Insomnia    • Migraine    • Postmenopausal HRT (hormone replacement therapy)         Past Surgical History:   Procedure Laterality Date   • COLONOSCOPY     • ESOPHAGOSCOPY / EGD                         PT Assessment/Plan     Row Name 19 0800          PT Assessment    Assessment Comments  Pt continues to do well with improving function. Did ionto again secondary to the tenderness at left greater trochanter.  -GC        PT Plan    PT Plan Comments  Pt is to continue her HEP daily. Will re-ck later this week.  -GC       User Key  (r) = Recorded By, (t) = Taken By, (c) = Cosigned By    Initials Name Provider Type    Filiberto Caldera, PT Physical Therapist          Modalities     Row Name 19 0800             Iontophoresis 44202    Milliamps  1.5  -GC      MA/Min  40  -GC      Dexamethasone used  Yes  -GC      Patch Type  Medium left greater trochanter area  -GC      28379 - PT Iontophoresis Minutes  27  -GC        User Key  (r) = Recorded By, (t) = Taken By, (c) = Cosigned By    Initials Name Provider Type    Filiberto Caldera, PT Physical  Therapist        Exercises     Row Name 07/30/19 0800             Subjective Comments    Subjective Comments  Pt states her left hip is a little more sore today than last week.  -GC         Exercise 1    Exercise Name 1  Hamstring stretch with ADD-bilateral  -GC      Cueing 1  Verbal;Tactile  -GC      Reps 1  10  -GC      Time 1  10 secs  -GC         Exercise 2    Exercise Name 2  Piriformis stretch-bilateral  -GC      Cueing 2  Verbal;Tactile  -GC      Reps 2  10  -GC      Time 2  10 secs  -GC         Exercise 3    Exercise Name 3  Supine Clam Shell vs theraband-bilateral  -GC      Cueing 3  Verbal;Tactile  -GC      Reps 3  25  -GC      Time 3  silver  -GC         Exercise 4    Exercise Name 4  Hip ER vs theraband  -GC      Cueing 4  Verbal;Demo  -GC      Reps 4  25  -GC      Time 4  silver  -GC         Exercise 5    Exercise Name 5  Hip IR vs theraband  -GC      Cueing 5  Verbal;Demo  -GC      Reps 5  25  -GC      Time 5  silver  -GC         Exercise 6    Exercise Name 6  LLNT mobilzations for 3 biases left LE  -GC      Time 6  3 min  -GC         Exercise 7    Exercise Name 7  Bridges vs theraband  -GC      Cueing 7  Verbal;Tactile  -GC      Reps 7  25  -GC      Time 7  silver  -GC        User Key  (r) = Recorded By, (t) = Taken By, (c) = Cosigned By    Initials Name Provider Type     Filiberto Schultz, PT Physical Therapist                                          Time Calculation:   Start Time: 0800  Stop Time: 0906  Time Calculation (min): 66 min  Therapy Charges for Today     Code Description Service Date Service Provider Modifiers Qty    71166385742 HC PT IONTOPHORESIS EA 15 MIN 7/30/2019 Filiberto Schultz, PT GP 1    30294607241 HC PT THER PROC EA 15 MIN 7/30/2019 Filiberto Schultz, PT GP 1                    Filiberto Schultz PT  7/30/2019

## 2019-08-05 ENCOUNTER — HOSPITAL ENCOUNTER (OUTPATIENT)
Dept: PHYSICAL THERAPY | Facility: HOSPITAL | Age: 59
Setting detail: THERAPIES SERIES
Discharge: HOME | End: 2019-08-05

## 2019-08-05 DIAGNOSIS — M70.61 TROCHANTERIC BURSITIS OF BOTH HIPS: Primary | ICD-10-CM

## 2019-08-05 DIAGNOSIS — M70.62 TROCHANTERIC BURSITIS OF BOTH HIPS: Primary | ICD-10-CM

## 2019-08-05 PROCEDURE — 97110 THERAPEUTIC EXERCISES: CPT | Performed by: PHYSICAL THERAPIST

## 2019-08-05 NOTE — THERAPY TREATMENT NOTE
Outpatient Physical Therapy Ortho Treatment Note  SANDRA Hanks     Patient Name: Allyson Wilder  : 1960  MRN: [id number]  Today's Date: 2019      Visit Date: 2019    Visit Dx:    ICD-10-CM ICD-9-CM   1. Trochanteric bursitis of both hips M70.61 726.5    M70.62        Patient Active Problem List   Diagnosis   • Bursitis of hip   • Narrowing of intervertebral disc space   • Elevated hemoglobin A1c   • Headache   • Menopausal flushing   • Insomnia   • Migraine   • Celiac disease   • Health care maintenance   • Hyperglycemia   • Lymphocytic colitis   • Du's esophagus determined by biopsy   • Postmenopausal HRT (hormone replacement therapy)   • Trochanteric bursitis of both hips   • SI (sacroiliac) joint inflammation (CMS/HCC)        Past Medical History:   Diagnosis Date   • Bursitis of hip    • Celiac disease    • Heart murmur     FUNCTIONAL   • Insomnia    • Migraine    • Postmenopausal HRT (hormone replacement therapy)         Past Surgical History:   Procedure Laterality Date   • COLONOSCOPY     • ESOPHAGOSCOPY / EGD         PT Ortho     Row Name 19 1010       Hip/Thigh Palpation    Gluteus Neo  Left:;Tender  -GC    Gluteus Medius  Left:;Tender  -GC    Piriformis  Left:;Tender  -GC       MMT Right Lower Ext    Rt Hip Flexion MMT, Gross Movement  (5/5) normal  -GC    Rt Hip Extension MMT, Gross Movement  (5/5) normal  -GC    Rt Hip ABduction MMT, Gross Movement  (5/5) normal  -GC    Rt Hip ADduction MMT, Gross Movement  (5/5) normal  -GC    Rt Hip Internal (Medial) Rotation MMT, Gross Movement  (4+/5) good plus  -GC    Rt Hip External (Lateral) Rotation MMT, Gross Movement  (4+/5) good plus  -GC    Rt Knee Extension MMT, Gross Movement  (5/5) normal  -GC    Rt Knee Flexion MMT, Gross Movement  (5/5) normal  -GC       MMT Left Lower Ext    Lt Hip Flexion MMT, Gross Movement  (5/5) normal  -GC    Lt Hip Extension MMT, Gross Movement  (4+/5) good plus  -GC    Lt Hip ABduction MMT,  Gross Movement  (4/5) good  -GC    Lt Hip ADduction MMT, Gross Movement  (5/5) normal  -GC    Lt Hip Internal (Medial) Rotation MMT, Gross Movement  (4/5) good  -GC    Lt Hip External (Lateral) Rotation MMT, Gross Movement  (4+/5) good plus  -GC    Lt Knee Extension MMT, Gross Movement  (5/5) normal  -GC    Lt Knee Flexion MMT, Gross Movement  (5/5) normal  -GC      User Key  (r) = Recorded By, (t) = Taken By, (c) = Cosigned By    Initials Name Provider Type     Filiberto Schultz, PT Physical Therapist                      PT Assessment/Plan     Row Name 08/05/19 1010          PT Assessment    Assessment Comments  Pt is doing well with dcreasing c/o pain. Feel most of her soreness is due to continued decrease in her hip strength.  -GC        PT Plan    PT Plan Comments  Pt is to continue her HEP daily.  -GC       User Key  (r) = Recorded By, (t) = Taken By, (c) = Cosigned By    Initials Name Provider Type     Filiberto Schultz, PT Physical Therapist            Exercises     Row Name 08/05/19 1010             Subjective Comments    Subjective Comments  Pt states she had pain in her let buttock this morning when getting out of bed.  -GC         Exercise 1    Exercise Name 1  Hamstring stretch with ADD-bilateral  -GC      Cueing 1  Verbal;Tactile  -GC      Reps 1  10  -GC      Time 1  10 secs  -GC         Exercise 2    Exercise Name 2  Piriformis stretch-bilateral  -GC      Cueing 2  Verbal;Tactile  -GC      Reps 2  10  -GC      Time 2  10 secs  -GC         Exercise 3    Exercise Name 3  Supine Clam Shell vs theraband-bilateral  -GC      Cueing 3  Verbal;Tactile  -GC      Reps 3  25  -GC      Time 3  gold  -GC         Exercise 4    Exercise Name 4  Hip ER vs theraband  -GC      Cueing 4  Verbal;Demo  -GC      Reps 4  25  -GC      Time 4  gold  -GC         Exercise 5    Exercise Name 5  Hip IR vs theraband  -GC      Cueing 5  Verbal;Demo  -GC      Reps 5  25  -GC      Time 5  gold  -GC         Exercise 6    Exercise Name  6  LLNT mobilzations for 3 biases left LE  -GC      Time 6  3 min  -GC         Exercise 7    Exercise Name 7  Bridges vs theraband  -GC      Cueing 7  Verbal;Tactile  -GC      Reps 7  25  -GC      Time 7  gold  -GC         Exercise 8    Exercise Name 8  Prone hip extension  -GC      Cueing 8  Verbal;Tactile  -GC      Reps 8  25  -GC      Time 8  2#  -GC         Exercise 9    Exercise Name 9  Hip ABD  -GC      Cueing 9  Verbal;Tactile  -GC      Reps 9  25  -GC      Time 9  2#  -GC        User Key  (r) = Recorded By, (t) = Taken By, (c) = Cosigned By    Initials Name Provider Type     Filiberto Schultz, PT Physical Therapist                                          Time Calculation:   Start Time: 1010  Stop Time: 1041  Time Calculation (min): 31 min  Therapy Charges for Today     Code Description Service Date Service Provider Modifiers Qty    07639514158  PT THER PROC EA 15 MIN 8/5/2019 Filiberto Schultz, PT GP 1                    Filiberto Schultz, ESTRELLITA  8/5/2019

## 2019-08-07 ENCOUNTER — HOSPITAL ENCOUNTER (OUTPATIENT)
Dept: PHYSICAL THERAPY | Facility: HOSPITAL | Age: 59
Setting detail: THERAPIES SERIES
Discharge: HOME | End: 2019-08-07

## 2019-08-07 DIAGNOSIS — M70.61 TROCHANTERIC BURSITIS OF BOTH HIPS: Primary | ICD-10-CM

## 2019-08-07 DIAGNOSIS — M70.62 TROCHANTERIC BURSITIS OF BOTH HIPS: Primary | ICD-10-CM

## 2019-08-07 PROCEDURE — 97110 THERAPEUTIC EXERCISES: CPT | Performed by: PHYSICAL THERAPIST

## 2019-08-07 NOTE — THERAPY TREATMENT NOTE
Outpatient Physical Therapy Ortho Treatment Note  SANDRA Hanks     Patient Name: Allyson Wilder  : 1960  MRN: [id number]  Today's Date: 2019      Visit Date: 2019    Visit Dx:    ICD-10-CM ICD-9-CM   1. Trochanteric bursitis of both hips M70.61 726.5    M70.62        Patient Active Problem List   Diagnosis   • Bursitis of hip   • Narrowing of intervertebral disc space   • Elevated hemoglobin A1c   • Headache   • Menopausal flushing   • Insomnia   • Migraine   • Celiac disease   • Health care maintenance   • Hyperglycemia   • Lymphocytic colitis   • Du's esophagus determined by biopsy   • Postmenopausal HRT (hormone replacement therapy)   • Trochanteric bursitis of both hips   • SI (sacroiliac) joint inflammation (CMS/HCC)        Past Medical History:   Diagnosis Date   • Bursitis of hip    • Celiac disease    • Heart murmur     FUNCTIONAL   • Insomnia    • Migraine    • Postmenopausal HRT (hormone replacement therapy)         Past Surgical History:   Procedure Laterality Date   • COLONOSCOPY     • ESOPHAGOSCOPY / EGD         PT Ortho     Row Name 19 1010       Hip/Thigh Palpation    Gluteus Neo  Left:;Tender  -GC    Gluteus Medius  Left:;Tender  -GC    Piriformis  Left:;Tender  -GC       MMT Right Lower Ext    Rt Hip Flexion MMT, Gross Movement  (5/5) normal  -GC    Rt Hip Extension MMT, Gross Movement  (5/5) normal  -GC    Rt Hip ABduction MMT, Gross Movement  (5/5) normal  -GC    Rt Hip ADduction MMT, Gross Movement  (5/5) normal  -GC    Rt Hip Internal (Medial) Rotation MMT, Gross Movement  (4+/5) good plus  -GC    Rt Hip External (Lateral) Rotation MMT, Gross Movement  (4+/5) good plus  -GC    Rt Knee Extension MMT, Gross Movement  (5/5) normal  -GC    Rt Knee Flexion MMT, Gross Movement  (5/5) normal  -GC       MMT Left Lower Ext    Lt Hip Flexion MMT, Gross Movement  (5/5) normal  -GC    Lt Hip Extension MMT, Gross Movement  (4+/5) good plus  -GC    Lt Hip ABduction MMT,  Gross Movement  (4/5) good  -GC    Lt Hip ADduction MMT, Gross Movement  (5/5) normal  -GC    Lt Hip Internal (Medial) Rotation MMT, Gross Movement  (4/5) good  -GC    Lt Hip External (Lateral) Rotation MMT, Gross Movement  (4+/5) good plus  -GC    Lt Knee Extension MMT, Gross Movement  (5/5) normal  -GC    Lt Knee Flexion MMT, Gross Movement  (5/5) normal  -GC      User Key  (r) = Recorded By, (t) = Taken By, (c) = Cosigned By    Initials Name Provider Type     Filiberto Schultz, PT Physical Therapist                      PT Assessment/Plan     Row Name 08/07/19 1000          PT Assessment    Assessment Comments  Feel some of her pain is caused by neural tightness. She is showing better function.  -GC        PT Plan    PT Plan Comments  Pt is to continue her HEP daily.  -GC       User Key  (r) = Recorded By, (t) = Taken By, (c) = Cosigned By    Initials Name Provider Type     Filiberto Schultz, PT Physical Therapist            Exercises     Row Name 08/07/19 1000             Subjective Comments    Subjective Comments  Pt c/o some pulling in her left buttock this morning.  -GC         Exercise 1    Exercise Name 1  Hamstring stretch with ADD-bilateral  -GC      Cueing 1  Verbal;Tactile  -GC      Reps 1  10  -GC      Time 1  10 secs  -GC         Exercise 2    Exercise Name 2  Piriformis stretch-bilateral  -GC      Cueing 2  Verbal;Tactile  -GC      Reps 2  10  -GC      Time 2  10 secs  -GC         Exercise 3    Exercise Name 3  Supine Clam Shell vs theraband-bilateral  -GC      Cueing 3  Verbal;Tactile  -GC      Reps 3  25  -GC      Time 3  gold  -GC         Exercise 4    Exercise Name 4  Hip ER vs theraband  -GC      Cueing 4  Verbal;Demo  -GC      Reps 4  25  -GC      Time 4  gold  -GC         Exercise 5    Exercise Name 5  Hip IR vs theraband  -GC      Cueing 5  Verbal;Demo  -GC      Reps 5  25  -GC      Time 5  gold  -GC         Exercise 6    Exercise Name 6  LLNT mobilzations for 3 biases left LE  -GC      Time 6   3 min  -GC         Exercise 7    Exercise Name 7  Bridges vs theraband  -GC      Cueing 7  Verbal;Tactile  -GC      Reps 7  25  -GC      Time 7  gold  -GC         Exercise 8    Exercise Name 8  Prone hip extension  -GC      Cueing 8  Verbal;Tactile  -GC      Reps 8  25  -GC      Time 8  2#  -GC         Exercise 9    Exercise Name 9  Hip ABD  -GC      Cueing 9  Verbal;Tactile  -GC      Reps 9  25  -GC      Time 9  2#  -GC         Exercise 10    Exercise Name 10  Self mobilization of left sciatic nerve in siting with LE on seat of chair  -GC      Cueing 10  Verbal;Demo  -GC      Reps 10  20  -GC        User Key  (r) = Recorded By, (t) = Taken By, (c) = Cosigned By    Initials Name Provider Type     Filiberto Schultz, PT Physical Therapist                                          Time Calculation:   Start Time: 1000  Stop Time: 1036  Time Calculation (min): 36 min  Therapy Charges for Today     Code Description Service Date Service Provider Modifiers Qty    54564182824 HC PT THER PROC EA 15 MIN 8/7/2019 Filiberto Schultz, PT GP 1                    Filiberto Schultz, ESTRELLITA  8/7/2019

## 2019-08-13 ENCOUNTER — HOSPITAL ENCOUNTER (OUTPATIENT)
Dept: PHYSICAL THERAPY | Facility: HOSPITAL | Age: 59
Setting detail: THERAPIES SERIES
Discharge: HOME | End: 2019-08-13

## 2019-08-13 DIAGNOSIS — M70.62 TROCHANTERIC BURSITIS OF BOTH HIPS: Primary | ICD-10-CM

## 2019-08-13 DIAGNOSIS — M70.61 TROCHANTERIC BURSITIS OF BOTH HIPS: Primary | ICD-10-CM

## 2019-08-13 PROCEDURE — 97110 THERAPEUTIC EXERCISES: CPT | Performed by: PHYSICAL THERAPIST

## 2019-08-13 NOTE — THERAPY TREATMENT NOTE
Outpatient Physical Therapy Ortho Treatment Note  SANDRA Hanks     Patient Name: Allyson Wilder  : 1960  MRN: [id number]  Today's Date: 2019      Visit Date: 2019    Visit Dx:    ICD-10-CM ICD-9-CM   1. Trochanteric bursitis of both hips M70.61 726.5    M70.62        Patient Active Problem List   Diagnosis   • Bursitis of hip   • Narrowing of intervertebral disc space   • Elevated hemoglobin A1c   • Headache   • Menopausal flushing   • Insomnia   • Migraine   • Celiac disease   • Health care maintenance   • Hyperglycemia   • Lymphocytic colitis   • Du's esophagus determined by biopsy   • Postmenopausal HRT (hormone replacement therapy)   • Trochanteric bursitis of both hips   • SI (sacroiliac) joint inflammation (CMS/HCC)        Past Medical History:   Diagnosis Date   • Bursitis of hip    • Celiac disease    • Heart murmur     FUNCTIONAL   • Insomnia    • Migraine    • Postmenopausal HRT (hormone replacement therapy)         Past Surgical History:   Procedure Laterality Date   • COLONOSCOPY     • ESOPHAGOSCOPY / EGD         PT Ortho     Row Name 19 0745       Quarter Clearing    Quarter Clearing  Lower Quarter Clearing LLNTT negative for all 3 biases  -GC       Hip/Thigh Palpation    Greater Trochanter  Left:;Tender  -GC       MMT Right Lower Ext    Rt Hip Flexion MMT, Gross Movement  (5/5) normal  -GC    Rt Hip Extension MMT, Gross Movement  (5/5) normal  -GC    Rt Hip ABduction MMT, Gross Movement  (5/5) normal  -GC    Rt Hip ADduction MMT, Gross Movement  (5/5) normal  -GC    Rt Hip Internal (Medial) Rotation MMT, Gross Movement  (5/5) normal  -GC    Rt Hip External (Lateral) Rotation MMT, Gross Movement  (5/5) normal  -GC    Rt Knee Extension MMT, Gross Movement  (5/5) normal  -GC    Rt Knee Flexion MMT, Gross Movement  (5/5) normal  -GC       MMT Left Lower Ext    Lt Hip Flexion MMT, Gross Movement  (5/5) normal  -GC    Lt Hip Extension MMT, Gross Movement  (5/5) normal  -GC     Lt Hip ABduction MMT, Gross Movement  (5/5) normal  -GC    Lt Hip ADduction MMT, Gross Movement  (5/5) normal  -GC    Lt Hip Internal (Medial) Rotation MMT, Gross Movement  (5/5) normal  -GC    Lt Hip External (Lateral) Rotation MMT, Gross Movement  (5/5) normal  -GC    Lt Knee Extension MMT, Gross Movement  (5/5) normal  -GC    Lt Knee Flexion MMT, Gross Movement  (5/5) normal  -GC       Lower Extremity Flexibility    Hamstrings  Bilateral:;WNL  -GC    Hip Flexors  Bilateral:;WNL  -GC    Quadriceps  Bilateral:;WNL  -GC    ITB  Bilateral:;WNL  -GC    Hip External Rotators  Bilateral:;WNL  -GC    Hip Internal Rotators  Bilateral:;WNL  -GC      User Key  (r) = Recorded By, (t) = Taken By, (c) = Cosigned By    Initials Name Provider Type    Filiberto Caldera, PT Physical Therapist                      PT Assessment/Plan     Row Name 08/13/19 8142          PT Assessment    Assessment Comments  Pt is doing well with goals met. Feel she is nearing end of needing skilled therapy services.  -GC        PT Plan    PT Plan Comments  Pt it to continue her HEP daily. will re-ck in 7-10 days. Will wean from therapy.  -GC       User Key  (r) = Recorded By, (t) = Taken By, (c) = Cosigned By    Initials Name Provider Type    Filiberto Caldera, PT Physical Therapist            Exercises     Row Name 08/13/19 1308             Subjective Comments    Subjective Comments  Pt states she did a high ropes course yesterday and did not have nay trouble.  -GC         Exercise 1    Exercise Name 1  Hamstring stretch with ADD-bilateral  -GC      Cueing 1  Verbal;Tactile  -GC      Reps 1  10  -GC      Time 1  10 secs  -GC         Exercise 2    Exercise Name 2  Piriformis stretch-bilateral  -GC      Cueing 2  Verbal;Tactile  -GC      Reps 2  10  -GC      Time 2  10 secs  -GC         Exercise 3    Exercise Name 3  Supine Clam Shell vs theraband-bilateral  -GC      Cueing 3  Verbal;Tactile  -GC      Reps 3  25  -GC      Time 3  gold  -GC          Exercise 4    Exercise Name 4  Hip ER vs theraband  -GC      Cueing 4  Verbal;Demo  -GC      Reps 4  25  -GC      Time 4  gold  -GC         Exercise 5    Exercise Name 5  Hip IR vs theraband  -GC      Cueing 5  Verbal;Demo  -GC      Reps 5  25  -GC      Time 5  gold  -GC         Exercise 6    Exercise Name 6  LLNT mobilzations for 3 biases left LE  -GC      Time 6  3 min  -GC         Exercise 7    Exercise Name 7  Bridges vs theraband  -GC      Cueing 7  Verbal;Tactile  -GC      Reps 7  25  -GC      Time 7  gold  -GC         Exercise 8    Exercise Name 8  Prone hip extension  -GC      Cueing 8  Verbal;Tactile  -GC      Reps 8  25  -GC      Time 8  3#  -GC         Exercise 9    Exercise Name 9  Hip ABD  -GC      Cueing 9  Verbal;Tactile  -GC      Reps 9  25  -GC      Time 9  3#  -GC         Exercise 10    Exercise Name 10  Self mobilization of left sciatic nerve in siting with LE on seat of chair  -GC      Cueing 10  Verbal;Demo  -GC      Reps 10  20  -GC        User Key  (r) = Recorded By, (t) = Taken By, (c) = Cosigned By    Initials Name Provider Type     Filiberto Schultz, PT Physical Therapist                       PT OP Goals     Row Name 08/13/19 0745          PT Short Term Goals    STG Date to Achieve  07/22/19  -     STG 1  Decrease bilateral hip pain to 3-4/10 with activity.  -     STG 1 Progress  Met  -     STG 2  Increase hamstring flexibility to WFL with testing.  -     STG 2 Progress  Met  -     STG 3  Increase piriformis flexibility to only a minimal restriction with testing.  -     STG 3 Progress  Met  -     STG 4  Increase bilateral hip strength to at least 4+/5 all planes with testing.  -     STG 4 Progress  Met  -     STG 5  Pt will be independent with her HEP issued by this therapist.  -     STG 5 Progress  Met  -        Long Term Goals    LTG Date to Achieve  08/05/19  -     LTG 1  Decrease bilateral hip pain to 0-1/10 with activity.  -     LTG 1 Progress  Met  -      LTG 2  Increase piriformis flexibility to WFL with testing.  -     LTG 2 Progress  Met  -GC     LTG 3  Increase bilateral hip strength to 5/5 all planes with testing.  -     LTG 3 Progress  Met  -GC     LTG 4  Pt will be independetn with all ADLs and have a LEFS score > 65.  -     LTG 4 Progress  Met  -GC       User Key  (r) = Recorded By, (t) = Taken By, (c) = Cosigned By    Initials Name Provider Type     Filiberto Schultz, PT Physical Therapist                         Time Calculation:   Start Time: 0745  Stop Time: 0822  Time Calculation (min): 37 min  Therapy Charges for Today     Code Description Service Date Service Provider Modifiers Qty    82923573662  PT THER PROC EA 15 MIN 8/13/2019 Filiberto Schultz, PT GP 1                    Filiberto Schultz, ESTRELLITA  8/13/2019

## 2019-08-27 ENCOUNTER — HOSPITAL ENCOUNTER (OUTPATIENT)
Dept: PHYSICAL THERAPY | Facility: HOSPITAL | Age: 59
Setting detail: THERAPIES SERIES
Discharge: HOME | End: 2019-08-27

## 2019-08-27 DIAGNOSIS — M70.62 TROCHANTERIC BURSITIS OF BOTH HIPS: Primary | ICD-10-CM

## 2019-08-27 DIAGNOSIS — M70.61 TROCHANTERIC BURSITIS OF BOTH HIPS: Primary | ICD-10-CM

## 2019-08-27 PROCEDURE — 97110 THERAPEUTIC EXERCISES: CPT | Performed by: PHYSICAL THERAPIST

## 2019-08-27 PROCEDURE — 97033 APP MDLTY 1+IONTPHRSIS EA 15: CPT | Performed by: PHYSICAL THERAPIST

## 2019-08-27 NOTE — THERAPY TREATMENT NOTE
Outpatient Physical Therapy Ortho Treatment Note  SANDRA Hanks     Patient Name: Allyson Wilder  : 1960  MRN: [id number]  Today's Date: 2019      Visit Date: 2019    Visit Dx:    ICD-10-CM ICD-9-CM   1. Trochanteric bursitis of both hips M70.61 726.5    M70.62        Patient Active Problem List   Diagnosis   • Bursitis of hip   • Narrowing of intervertebral disc space   • Elevated hemoglobin A1c   • Headache   • Menopausal flushing   • Insomnia   • Migraine   • Celiac disease   • Health care maintenance   • Hyperglycemia   • Lymphocytic colitis   • Du's esophagus determined by biopsy   • Postmenopausal HRT (hormone replacement therapy)   • Trochanteric bursitis of both hips   • SI (sacroiliac) joint inflammation (CMS/HCC)        Past Medical History:   Diagnosis Date   • Bursitis of hip    • Celiac disease    • Heart murmur     FUNCTIONAL   • Insomnia    • Migraine    • Postmenopausal HRT (hormone replacement therapy)         Past Surgical History:   Procedure Laterality Date   • COLONOSCOPY     • ESOPHAGOSCOPY / EGD         PT Ortho     Row Name 19 0750       Subjective Comments    Subjective Comments  Pt c/o increased lateral left pain over the last 2-3 days.  -GC       Hip/Thigh Palpation    Greater Trochanter  Left:;Tender  -GC       Hip Special Tests    DARÍO (hip vs SI pathology)  Left:;Negative  -GC    Hip scour test (labral vs hip pathology)  Left:;Positive  -GC       MMT Left Lower Ext    Lt Hip Flexion MMT, Gross Movement  (4+/5) good plus  -GC    Lt Hip Extension MMT, Gross Movement  (4+/5) good plus  -GC    Lt Hip ABduction MMT, Gross Movement  (4+/5) good plus  -GC    Lt Hip ADduction MMT, Gross Movement  (4+/5) good plus  -GC    Lt Hip Internal (Medial) Rotation MMT, Gross Movement  (4/5) good  -GC    Lt Hip External (Lateral) Rotation MMT, Gross Movement  (4/5) good  -GC    Lt Knee Extension MMT, Gross Movement  (5/5) normal  -GC    Lt Knee Flexion MMT, Gross Movement   (5/5) normal  -GC      User Key  (r) = Recorded By, (t) = Taken By, (c) = Cosigned By    Initials Name Provider Type    GC Filiberto Schultz, PT Physical Therapist                      PT Assessment/Plan     Row Name 08/27/19 0016          PT Assessment    Assessment Comments  Pt seems to have had a flare up of her bursitis symptoms. She did report decreasd pain after treatment this morning.  -GC        PT Plan    PT Plan Comments  Pt is to continue her HEP daily.  -GC       User Key  (r) = Recorded By, (t) = Taken By, (c) = Cosigned By    Initials Name Provider Type    GC Filiberto Schultz, PT Physical Therapist          Modalities     Row Name 08/27/19 0750             Iontophoresis [id number]    Milliamps  1.5  -GC      MA/Min  40  -GC      Dexamethasone used  Yes  -GC      Patch Type  Medium left greater trochanter area  -GC        User Key  (r) = Recorded By, (t) = Taken By, (c) = Cosigned By    Initials Name Provider Type    Filiberto Caldera, PT Physical Therapist        Exercises     Row Name 08/27/19 0751             Subjective Comments    Subjective Comments  Pt c/o increased lateral left pain over the last 2-3 days.  -GC         Exercise 1    Exercise Name 1  Hamstring stretch with ADD-bilateral  -GC      Cueing 1  Verbal;Tactile  -GC      Reps 1  10  -GC      Time 1  10 secs  -GC         Exercise 2    Exercise Name 2  Piriformis stretch-bilateral  -GC      Cueing 2  Verbal;Tactile  -GC      Reps 2  10  -GC      Time 2  10 secs  -GC         Exercise 3    Exercise Name 3  Supine Clam Shell vs theraband-bilateral  -GC      Cueing 3  Verbal;Tactile  -GC      Reps 3  25  -GC      Time 3  gold  -GC         Exercise 4    Exercise Name 4  Hip ER vs theraband  -GC      Cueing 4  Verbal;Demo  -GC      Reps 4  25  -GC      Time 4  gold  -GC         Exercise 5    Exercise Name 5  Hip IR vs theraband  -GC      Cueing 5  Verbal;Demo  -GC      Reps 5  25  -GC      Time 5  gold  -GC         Exercise 6    Exercise Name 6  LLNT  mobilzations for 3 biases left LE  -GC      Time 6  3 min  -GC         Exercise 7    Exercise Name 7  Bridges vs theraband  -GC      Cueing 7  Verbal;Tactile  -GC      Reps 7  25  -GC      Time 7  gold  -GC         Exercise 8    Exercise Name 8  Prone hip extension  -GC      Cueing 8  Verbal;Tactile  -GC         Exercise 9    Exercise Name 9  Hip ABD  -GC      Cueing 9  Verbal;Tactile  -GC         Exercise 10    Exercise Name 10  hip mobilizations  -GC      Cueing 10  Verbal;Demo  -GC      Time 10  2 min  -GC        User Key  (r) = Recorded By, (t) = Taken By, (c) = Cosigned By    Initials Name Provider Type    GC Filiberto Schultz, PT Physical Therapist                                          Time Calculation:   Start Time: 0750  Stop Time: 0851  Time Calculation (min): 61 min  Therapy Charges for Today     Code Description Service Date Service Provider Modifiers Qty    55361536619 HC PT THER PROC EA 15 MIN 8/27/2019 Filiberto Schultz, PT GP 1    09668084913 HC PT IONTOPHORESIS EA 15 MIN 8/27/2019 Filiberto Schultz, PT GP 1                    Filiberto Schultz PT  8/27/2019

## 2019-08-29 ENCOUNTER — HOSPITAL ENCOUNTER (OUTPATIENT)
Dept: PHYSICAL THERAPY | Facility: HOSPITAL | Age: 59
Setting detail: THERAPIES SERIES
Discharge: HOME | End: 2019-08-29

## 2019-08-29 DIAGNOSIS — M70.61 TROCHANTERIC BURSITIS OF BOTH HIPS: Primary | ICD-10-CM

## 2019-08-29 DIAGNOSIS — M70.62 TROCHANTERIC BURSITIS OF BOTH HIPS: Primary | ICD-10-CM

## 2019-08-29 PROCEDURE — 97033 APP MDLTY 1+IONTPHRSIS EA 15: CPT | Performed by: PHYSICAL THERAPIST

## 2019-08-29 PROCEDURE — 97110 THERAPEUTIC EXERCISES: CPT | Performed by: PHYSICAL THERAPIST

## 2019-08-29 NOTE — THERAPY TREATMENT NOTE
Outpatient Physical Therapy Ortho Treatment Note  SANDRA Hanks     Patient Name: Allyson Wilder  : 1960  MRN: [id number]  Today's Date: 2019      Visit Date: 2019    Visit Dx:    ICD-10-CM ICD-9-CM   1. Trochanteric bursitis of both hips M70.61 726.5    M70.62        Patient Active Problem List   Diagnosis   • Bursitis of hip   • Narrowing of intervertebral disc space   • Elevated hemoglobin A1c   • Headache   • Menopausal flushing   • Insomnia   • Migraine   • Celiac disease   • Health care maintenance   • Hyperglycemia   • Lymphocytic colitis   • Du's esophagus determined by biopsy   • Postmenopausal HRT (hormone replacement therapy)   • Trochanteric bursitis of both hips   • SI (sacroiliac) joint inflammation (CMS/HCC)        Past Medical History:   Diagnosis Date   • Bursitis of hip    • Celiac disease    • Heart murmur     FUNCTIONAL   • Insomnia    • Migraine    • Postmenopausal HRT (hormone replacement therapy)         Past Surgical History:   Procedure Laterality Date   • COLONOSCOPY     • ESOPHAGOSCOPY / EGD         PT Ortho     Row Name 19 1030       Subjective Comments    Subjective Comments  Pt states her hip is feeling better and she did some exercising at a [employer] camp this morning.  -GC    Row Name 19 0750       Subjective Comments    Subjective Comments  Pt c/o increased lateral left pain over the last 2-3 days.  -GC       Hip/Thigh Palpation    Greater Trochanter  Left:;Tender  -GC       Hip Special Tests    DARÍO (hip vs SI pathology)  Left:;Negative  -GC    Hip scour test (labral vs hip pathology)  Left:;Positive  -GC       MMT Left Lower Ext    Lt Hip Flexion MMT, Gross Movement  (4+/5) good plus  -GC    Lt Hip Extension MMT, Gross Movement  (4+/5) good plus  -GC    Lt Hip ABduction MMT, Gross Movement  (4+/5) good plus  -GC    Lt Hip ADduction MMT, Gross Movement  (4+/5) good plus  -GC    Lt Hip Internal (Medial) Rotation MMT, Gross Movement  (4/5) good  -GC     Lt Hip External (Lateral) Rotation MMT, Gross Movement  (4/5) good  -GC    Lt Knee Extension MMT, Gross Movement  (5/5) normal  -GC    Lt Knee Flexion MMT, Gross Movement  (5/5) normal  -GC      User Key  (r) = Recorded By, (t) = Taken By, (c) = Cosigned By    Initials Name Provider Type    GC Filiberto Schultz, PT Physical Therapist                      PT Assessment/Plan     Row Name 08/29/19 1030          PT Assessment    Assessment Comments  Pt is doing very well with decreased c/o pain and improved function.  -GC        PT Plan    PT Plan Comments  Pt is to continue her HEP daily.  -GC       User Key  (r) = Recorded By, (t) = Taken By, (c) = Cosigned By    Initials Name Provider Type    Filiberto Caldera, PT Physical Therapist          Modalities     Row Name 08/29/19 1030             Iontophoresis [id number]    Milliamps  2  -GC      MA/Min  40  -GC      Dexamethasone used  Yes  -GC      Patch Type  Medium left greater trochanter area  -GC        User Key  (r) = Recorded By, (t) = Taken By, (c) = Cosigned By    Initials Name Provider Type    Filiberto Caldera, PT Physical Therapist        Exercises     Row Name 08/29/19 1030             Subjective Comments    Subjective Comments  Pt states her hip is feeling better and she did some exercising at a boot camp this morning.  -GC         Exercise 1    Exercise Name 1  Hamstring stretch with ADD-bilateral  -GC      Cueing 1  Verbal;Tactile  -GC      Reps 1  10  -GC      Time 1  10 secs  -GC         Exercise 2    Exercise Name 2  Piriformis stretch-bilateral  -GC      Cueing 2  Verbal;Tactile  -GC      Reps 2  10  -GC      Time 2  10 secs  -GC         Exercise 3    Exercise Name 3  Supine Clam Shell vs theraband-bilateral  -GC      Cueing 3  Verbal;Tactile  -GC      Reps 3  25  -GC      Time 3  gold  -GC         Exercise 4    Exercise Name 4  Hip ER vs theraband  -GC      Cueing 4  Verbal;Demo  -GC      Reps 4  25  -GC      Time 4  gold  -GC         Exercise 5     Exercise Name 5  Hip IR vs theraband  -GC      Cueing 5  Verbal;Demo  -GC      Reps 5  25  -GC      Time 5  gold  -GC         Exercise 6    Exercise Name 6  LLNT mobilzations for 3 biases left LE  -GC      Time 6  3 min  -GC         Exercise 7    Exercise Name 7  Bridges vs theraband  -GC      Cueing 7  Verbal;Tactile  -GC      Reps 7  25  -GC      Time 7  gold  -GC         Exercise 8    Exercise Name 8  Prone hip extension  -GC      Cueing 8  Verbal;Tactile  -GC         Exercise 9    Exercise Name 9  Hip ABD  -GC      Cueing 9  Verbal;Tactile  -GC         Exercise 10    Exercise Name 10  hip mobilizations  -GC      Cueing 10  Verbal;Demo  -GC      Time 10  2 min  -GC        User Key  (r) = Recorded By, (t) = Taken By, (c) = Cosigned By    Initials Name Provider Type     Filiberto Schultz, PT Physical Therapist                                          Time Calculation:   Start Time: 1030  Stop Time: 1121  Time Calculation (min): 51 min  Therapy Charges for Today     Code Description Service Date Service Provider Modifiers Qty    61505436121 HC PT THER PROC EA 15 MIN 8/29/2019 Filiberto Schultz, PT GP 1    36278758716 HC PT IONTOPHORESIS EA 15 MIN 8/29/2019 Filiberto Schultz, PT GP 1                    Filiberto Schultz PT  8/29/2019

## 2019-09-06 ENCOUNTER — HOSPITAL ENCOUNTER (OUTPATIENT)
Dept: PHYSICAL THERAPY | Facility: HOSPITAL | Age: 59
Setting detail: THERAPIES SERIES
Discharge: HOME | End: 2019-09-06

## 2019-09-06 DIAGNOSIS — M70.62 TROCHANTERIC BURSITIS OF BOTH HIPS: Primary | ICD-10-CM

## 2019-09-06 DIAGNOSIS — M70.61 TROCHANTERIC BURSITIS OF BOTH HIPS: Primary | ICD-10-CM

## 2019-09-06 PROCEDURE — G0283 ELEC STIM OTHER THAN WOUND: HCPCS | Performed by: PHYSICAL THERAPIST

## 2019-09-06 NOTE — THERAPY TREATMENT NOTE
Outpatient Physical Therapy Ortho Treatment Note  SANDRA Hanks     Patient Name: Allyson Wilder  : 1960  MRN: [id number]  Today's Date: 2019      Visit Date: 2019    Visit Dx:    ICD-10-CM ICD-9-CM   1. Trochanteric bursitis of both hips M70.61 726.5    M70.62        Patient Active Problem List   Diagnosis   • Bursitis of hip   • Narrowing of intervertebral disc space   • Elevated hemoglobin A1c   • Headache   • Menopausal flushing   • Insomnia   • Migraine   • Celiac disease   • Health care maintenance   • Hyperglycemia   • Lymphocytic colitis   • Du's esophagus determined by biopsy   • Postmenopausal HRT (hormone replacement therapy)   • Trochanteric bursitis of both hips   • SI (sacroiliac) joint inflammation (CMS/HCC)        Past Medical History:   Diagnosis Date   • Bursitis of hip    • Celiac disease    • Heart murmur     FUNCTIONAL   • Insomnia    • Migraine    • Postmenopausal HRT (hormone replacement therapy)         Past Surgical History:   Procedure Laterality Date   • COLONOSCOPY     • ESOPHAGOSCOPY / EGD         PT Ortho     Row Name 19 0800       Subjective Comments    Subjective Comments  Pt states her hip and buttock both have hurt more recently.  -GC      User Key  (r) = Recorded By, (t) = Taken By, (c) = Cosigned By    Initials Name Provider Type    Filiberto Caldera, PT Physical Therapist                      PT Assessment/Plan     Row Name 19 0800          PT Assessment    Assessment Comments  Reviewed HEP. Pt had decreased c/o pain after the mopdalities today.  -GC        PT Plan    PT Plan Comments  Pt is out of town the next 2 weeks. Will follow up again when she returns.  -GC       User Key  (r) = Recorded By, (t) = Taken By, (c) = Cosigned By    Initials Name Provider Type    Filiberto Caldera, PT Physical Therapist          Modalities     Row Name 19 0800             Moist Heat    MH Applied  Yes  -GC      Location  left hip/buttock with pt in  right sidelying with IFC  -GC      Rx Minutes  15 mins  -GC       Prior to Rx  Yes  -         ELECTRICAL STIMULATION    Attended/Unattended  Unattended  -      Stimulation Type  IFC  -GC      Location/Electrode Placement/Other  left hip/buttock with pt in right sidelying with   -       PT E-Stim Unattended (Manual) Minutes  15  -GC        User Key  (r) = Recorded By, (t) = Taken By, (c) = Cosigned By    Initials Name Provider Type    Filiberto Caldera PT Physical Therapist        OP Exercises     Row Name 09/06/19 0800             Subjective Comments    Subjective Comments  Pt states her hip and buttock both have hurt more recently.  -        User Key  (r) = Recorded By, (t) = Taken By, (c) = Cosigned By    Initials Name Provider Type    Filiberto Caldera PT Physical Therapist                                          Time Calculation:   Start Time: 0800  Stop Time: 0839  Time Calculation (min): 39 min  Therapy Charges for Today     Code Description Service Date Service Provider Modifiers Qty    27322217873  PT ELECTRICAL STIM UNATTENDED 9/6/2019 Filiberto Schultz, PT  1                    Filiberto Schultz PT  9/6/2019

## 2019-10-02 ENCOUNTER — OFFICE VISIT (OUTPATIENT)
Dept: FAMILY MEDICINE CLINIC | Facility: CLINIC | Age: 59
End: 2019-10-02

## 2019-10-02 ENCOUNTER — OFFICE (AMBULATORY)
Dept: URBAN - METROPOLITAN AREA CLINIC 75 | Facility: CLINIC | Age: 59
End: 2019-10-02

## 2019-10-02 ENCOUNTER — HOSPITAL ENCOUNTER (OUTPATIENT)
Dept: GENERAL RADIOLOGY | Facility: HOSPITAL | Age: 59
Discharge: HOME | End: 2019-10-02
Admitting: FAMILY MEDICINE

## 2019-10-02 VITALS
HEART RATE: 68 BPM | WEIGHT: 130 LBS | HEIGHT: 64 IN | SYSTOLIC BLOOD PRESSURE: 123 MMHG | DIASTOLIC BLOOD PRESSURE: 79 MMHG

## 2019-10-02 VITALS
BODY MASS INDEX: 21.85 KG/M2 | SYSTOLIC BLOOD PRESSURE: 122 MMHG | DIASTOLIC BLOOD PRESSURE: 82 MMHG | HEART RATE: 65 BPM | OXYGEN SATURATION: 98 % | WEIGHT: 128 LBS | HEIGHT: 64 IN

## 2019-10-02 DIAGNOSIS — K22.4 DYSKINESIA OF ESOPHAGUS: ICD-10-CM

## 2019-10-02 DIAGNOSIS — M54.32 BACK PAIN WITH LEFT-SIDED SCIATICA: ICD-10-CM

## 2019-10-02 DIAGNOSIS — K29.70 GASTRITIS, UNSPECIFIED, WITHOUT BLEEDING: ICD-10-CM

## 2019-10-02 DIAGNOSIS — K29.80 DUODENITIS WITHOUT BLEEDING: ICD-10-CM

## 2019-10-02 DIAGNOSIS — M46.1 SI (SACROILIAC) JOINT INFLAMMATION (HCC): ICD-10-CM

## 2019-10-02 DIAGNOSIS — K22.70 BARRETT'S ESOPHAGUS WITHOUT DYSPLASIA: ICD-10-CM

## 2019-10-02 DIAGNOSIS — K26.9 DUODENAL ULCER, UNSPECIFIED AS ACUTE OR CHRONIC, WITHOUT HEM: ICD-10-CM

## 2019-10-02 DIAGNOSIS — M46.1 SI (SACROILIAC) JOINT INFLAMMATION (HCC): Primary | ICD-10-CM

## 2019-10-02 DIAGNOSIS — K52.9 NONINFECTIVE GASTROENTERITIS AND COLITIS, UNSPECIFIED: ICD-10-CM

## 2019-10-02 DIAGNOSIS — K90.0 CELIAC DISEASE: ICD-10-CM

## 2019-10-02 PROCEDURE — 96372 THER/PROPH/DIAG INJ SC/IM: CPT | Performed by: FAMILY MEDICINE

## 2019-10-02 PROCEDURE — 72110 X-RAY EXAM L-2 SPINE 4/>VWS: CPT

## 2019-10-02 PROCEDURE — 99213 OFFICE O/P EST LOW 20 MIN: CPT | Performed by: INTERNAL MEDICINE

## 2019-10-02 PROCEDURE — 99213 OFFICE O/P EST LOW 20 MIN: CPT | Performed by: FAMILY MEDICINE

## 2019-10-02 RX ORDER — MESALAMINE 1.2 G/1
TABLET, DELAYED RELEASE ORAL
Qty: 360 | Refills: 3 | Status: COMPLETED
End: 2024-08-14

## 2019-10-02 RX ORDER — METHYLPREDNISOLONE ACETATE 80 MG/ML
80 INJECTION, SUSPENSION INTRA-ARTICULAR; INTRALESIONAL; INTRAMUSCULAR; SOFT TISSUE ONCE
Status: COMPLETED | OUTPATIENT
Start: 2019-10-02 | End: 2019-10-02

## 2019-10-02 RX ADMIN — METHYLPREDNISOLONE ACETATE 80 MG: 80 INJECTION, SUSPENSION INTRA-ARTICULAR; INTRALESIONAL; INTRAMUSCULAR; SOFT TISSUE at 13:10

## 2019-10-02 NOTE — PROGRESS NOTES
Chief Complaint   Patient presents with   • Sciatica     left side- still going to PT at [facility]        Low Back Pain: Patient complains of chronic low back pain. This is evaluated as a non injury. The patient first noted symptoms severalmonths ago. It was not related to no known injury. The pain is rated moderate, and is located at the left lumbar area, left sacroiliac area or left gluteal area. The pain is described as aching and occurs intermittently. The symptoms has been progressive. Symptoms are exacerbated by straining and twisting. Factors which relieve the pain include change in body position and NSAIDs. Other associated symptoms include tingling in the left leg. Previous history of symptoms: the problem is long-standing.   Treatment efforts have included PT, with relief.      Objective   General:  Alert , no distress  HEENT:  WNL  Heart: RR , no murmur  Vascular: good pulses in legs/feet  Lungs: clear  Back: mild decrease in ROM.    Neuro: Gait is normal, reflexes normal.  Skin: no rash.    Assessment/Plan   Allyson was seen today for sciatica.    Diagnoses and all orders for this visit:    SI (sacroiliac) joint inflammation (CMS/HCC)  -     XR Spine Lumbar 4+ View; Future    Back pain with left-sided sciatica  -     XR Spine Lumbar 4+ View; Future  -     methylPREDNISolone acetate (DEPO-medrol) injection 80 mg    f/u with PT  Will call with xrays.  I gave 80 mg depo medrol left gluteus medius area.for sciatica pain.              There are no discontinued medications.     There are no Patient Instructions on file for this visit.      We reviewed home treatments for back pain  No heavy lifting  Nightly back stretches  OTC medications: Tylenol, Advil, Aleve, IcyHot Patches  Hot soaks in tub.    Dr. Dav Palacios MD  [place], [place]  [facility].

## 2019-10-07 ENCOUNTER — HOSPITAL ENCOUNTER (OUTPATIENT)
Dept: PHYSICAL THERAPY | Facility: HOSPITAL | Age: 59
Setting detail: THERAPIES SERIES
Discharge: HOME | End: 2019-10-07

## 2019-10-07 DIAGNOSIS — M70.61 TROCHANTERIC BURSITIS OF BOTH HIPS: Primary | ICD-10-CM

## 2019-10-07 DIAGNOSIS — M70.62 TROCHANTERIC BURSITIS OF BOTH HIPS: Primary | ICD-10-CM

## 2019-10-07 PROCEDURE — 97110 THERAPEUTIC EXERCISES: CPT | Performed by: PHYSICAL THERAPIST

## 2019-10-07 NOTE — THERAPY TREATMENT NOTE
Outpatient Physical Therapy Ortho Treatment Note  SANDRA Hanks     Patient Name: Allyson Wilder  : 1960  MRN: [id number]  Today's Date: 10/7/2019      Visit Date: 10/07/2019    Visit Dx:    ICD-10-CM ICD-9-CM   1. Trochanteric bursitis of both hips M70.61 726.5    M70.62        Patient Active Problem List   Diagnosis   • Bursitis of hip   • Narrowing of intervertebral disc space   • Elevated hemoglobin A1c   • Headache   • Menopausal flushing   • Insomnia   • Migraine   • Celiac disease   • Health care maintenance   • Hyperglycemia   • Lymphocytic colitis   • Du's esophagus determined by biopsy   • Postmenopausal HRT (hormone replacement therapy)   • Trochanteric bursitis of both hips   • SI (sacroiliac) joint inflammation (CMS/HCC)   • Back pain with left-sided sciatica        Past Medical History:   Diagnosis Date   • Bursitis of hip    • Celiac disease    • Heart murmur     FUNCTIONAL   • Insomnia    • Migraine    • Postmenopausal HRT (hormone replacement therapy)         Past Surgical History:   Procedure Laterality Date   • COLONOSCOPY     • ESOPHAGOSCOPY / EGD         PT Ortho     Row Name 10/07/19 120       Subjective Comments    Subjective Comments  Pt states she is having more pain in her left buttock and SI area than in her hip.  -GC       Posture/Observations    Posture/Observations Comments  X-rays of LS spine showed degenerative changes only  -GC       Lumbosacral Accessory Motions    PA [place]- L1  WNL  -GC    PA [place]- L2  WNL  -GC    PA [place]- L3  WNL  -GC    PA [place]- L4  WNL  -GC    PA Glide- L5  Hypomobile  -GC    PA glide- Sacral base  Hypomobile  -GC       Lumbar/SI Special Tests    Stork Test (SI Dysfunction)  Left:;Positive  -GC    SLR (Neural Tension)  Left:;Negative  -GC       Lumbosacral Palpation    SI  Left:;Tender left on right posterior sacral torsion  -GC    Lumbosacral Segment  Tender FRS right at L5/S1  -GC      User Key  (r) = Recorded By, (t) = Taken By, (c) =  Cosigned By    Initials Name Provider Type     Filiberto Schultz, PT Physical Therapist                      PT Assessment/Plan     Row Name 10/07/19 1205          PT Assessment    Assessment Comments  Pt noted to have decreased c/o pain, increased ROM, and better alignement after treatment.  -GC        PT Plan    PT Plan Comments  Pt is to continue her HEP 3x daily. Will re-ck later this week.  -GC       User Key  (r) = Recorded By, (t) = Taken By, (c) = Cosigned By    Initials Name Provider Type     Filiberto Schultz PT Physical Therapist            OP Exercises     Row Name 10/07/19 1205             Subjective Comments    Subjective Comments  Pt states she is having more pain in her left buttock and SI area than in her hip.  -GC         Exercise 1    Exercise Name 1  Hamstring stretch with ADD-bilateral  -GC      Cueing 1  Verbal;Tactile  -GC      Reps 1  15  -GC      Time 1  10 secs  -GC         Exercise 2    Exercise Name 2  Piriformis stretch-bilateral  -GC      Cueing 2  Verbal;Tactile  -GC      Reps 2  15  -GC      Time 2  10 secs  -GC         Exercise 3    Exercise Name 3  MET for FRS riht at L5/S1 and lef ton right posterior sacral torsion  -GC      Cueing 3  Verbal;Tactile  -GC      Time 3  3 min  -GC         Exercise 4    Exercise Name 4  Unilateral press ups   -GC      Cueing 4  Verbal;Demo  -GC      Reps 4  20  -GC        User Key  (r) = Recorded By, (t) = Taken By, (c) = Cosigned By    Initials Name Provider Type     Filiberto Schultz PT Physical Therapist                                          Time Calculation:   Start Time: 1205  Stop Time: 1246  Time Calculation (min): 41 min  Therapy Charges for Today     Code Description Service Date Service Provider Modifiers Qty    34177086683  PT THER PROC EA 15 MIN 10/7/2019 Filiberto Schultz, PT GP 1                    Filiberto Schultz PT  10/7/2019

## 2019-10-09 ENCOUNTER — DOCUMENTATION (OUTPATIENT)
Dept: PHYSICAL THERAPY | Facility: HOSPITAL | Age: 59
End: 2019-10-09

## 2019-10-09 DIAGNOSIS — M70.61 TROCHANTERIC BURSITIS OF BOTH HIPS: Primary | ICD-10-CM

## 2019-10-09 DIAGNOSIS — M70.62 TROCHANTERIC BURSITIS OF BOTH HIPS: Primary | ICD-10-CM

## 2019-10-09 NOTE — THERAPY TREATMENT NOTE
Outpatient Physical Therapy Ortho Treatment Note       Patient Name: Allyson Wilder  : 1960  MRN: [id number]  Today's Date: 10/9/2019      Visit Date: 10/09/2019    Visit Dx:    ICD-10-CM ICD-9-CM   1. Trochanteric bursitis of both hips M70.61 726.5    M70.62        Patient Active Problem List   Diagnosis   • Bursitis of hip   • Narrowing of intervertebral disc space   • Elevated hemoglobin A1c   • Headache   • Menopausal flushing   • Insomnia   • Migraine   • Celiac disease   • Health care maintenance   • Hyperglycemia   • Lymphocytic colitis   • Du's esophagus determined by biopsy   • Postmenopausal HRT (hormone replacement therapy)   • Trochanteric bursitis of both hips   • SI (sacroiliac) joint inflammation (CMS/HCC)   • Back pain with left-sided sciatica        Past Medical History:   Diagnosis Date   • Bursitis of hip    • Celiac disease    • Heart murmur     FUNCTIONAL   • Insomnia    • Migraine    • Postmenopausal HRT (hormone replacement therapy)         Past Surgical History:   Procedure Laterality Date   • COLONOSCOPY     • ESOPHAGOSCOPY / EGD         PT Ortho     Row Name 10/09/19 1100       Subjective Comments    Subjective Comments  Pt states she is feeling really good and has not had any pain.  -GC       Lumbosacral Accessory Motions    PA [place]- L5  WNL  -GC    PA glide- Sacral base  WNL  -GC       Lumbar/SI Special Tests    Stork Test (SI Dysfunction)  Left:;Negative  -GC       Lumbosacral Palpation    SI  -- no dysfunction noted  -GC    Lumbosacral Segment  -- no dysfunction noted  -GC       Head/Neck/Trunk    Trunk Extension AROM  WFL  -GC    Trunk Flexion AROM  WFL  -GC    Trunk Lt Lateral Flexion AROM  WFL  -GC    Trunk Rt Lateral Flexion AROM  WFL  -GC    Trunk Lt Rotation AROM  WFL  -GC    Trunk Rt Rotation AROM  WFL  -GC    Row Name 10/07/19 1205       Subjective Comments    Subjective Comments  Pt states she is having more pain in her left buttock and SI area than in her hip.   -GC       Posture/Observations    Posture/Observations Comments  X-rays of LS spine showed degenerative changes only  -GC       Lumbosacral Accessory Motions    PA [place]- L1  WNL  -GC    PA [place]- L2  WNL  -GC    PA [place]- L3  WNL  -GC    PA [place]- L4  WNL  -GC    PA Glide- L5  Hypomobile  -GC    PA glide- Sacral base  Hypomobile  -GC       Lumbar/SI Special Tests    Stork Test (SI Dysfunction)  Left:;Positive  -GC    SLR (Neural Tension)  Left:;Negative  -GC       Lumbosacral Palpation    SI  Left:;Tender left on right posterior sacral torsion  -GC    Lumbosacral Segment  Tender FRS right at L5/S1  -GC      User Key  (r) = Recorded By, (t) = Taken By, (c) = Cosigned By    Initials Name Provider Type    Filiberto Caldera, PT Physical Therapist                      PT Assessment/Plan     Row Name 10/09/19 1100          PT Assessment    Assessment Comments  Pt is doing well with good trunk ROM, good alignment, and decreased c/o pain. Feel pt does not need therapy today.  -        PT Plan    PT Plan Comments  Pt is to continue her HEP daily. Will re-ck again next week.  -       User Key  (r) = Recorded By, (t) = Taken By, (c) = Cosigned By    Initials Name Provider Type    Filiberto Caldera, PT Physical Therapist            OP Exercises     Row Name 10/09/19 1100             Subjective Comments    Subjective Comments  Pt states she is feeling really good and has not had any pain.  -        User Key  (r) = Recorded By, (t) = Taken By, (c) = Cosigned By    Initials Name Provider Type    Filiberto Caldera, PT Physical Therapist                                          Time Calculation:   Start Time: 1100  Stop Time: 1108  Time Calculation (min): 8 min                Filiberto Schultz PT  10/9/2019

## 2019-12-17 DIAGNOSIS — G43.909 MIGRAINE WITHOUT STATUS MIGRAINOSUS, NOT INTRACTABLE, UNSPECIFIED MIGRAINE TYPE: ICD-10-CM

## 2019-12-18 RX ORDER — SUMATRIPTAN 25 MG/1
TABLET, FILM COATED ORAL
Qty: 9 TABLET | Refills: 0 | Status: SHIPPED | OUTPATIENT
Start: 2019-12-18 | End: 2020-03-16

## 2020-02-12 ENCOUNTER — OFFICE (AMBULATORY)
Dept: URBAN - METROPOLITAN AREA CLINIC 75 | Facility: CLINIC | Age: 60
End: 2020-02-12

## 2020-02-12 VITALS
DIASTOLIC BLOOD PRESSURE: 74 MMHG | HEART RATE: 73 BPM | HEIGHT: 64 IN | SYSTOLIC BLOOD PRESSURE: 120 MMHG | WEIGHT: 131 LBS

## 2020-02-12 DIAGNOSIS — K90.0 CELIAC DISEASE: ICD-10-CM

## 2020-02-12 PROCEDURE — 99213 OFFICE O/P EST LOW 20 MIN: CPT | Performed by: INTERNAL MEDICINE

## 2020-02-13 ENCOUNTER — APPOINTMENT (OUTPATIENT)
Dept: PHYSICAL THERAPY | Facility: HOSPITAL | Age: 60
End: 2020-02-13

## 2020-03-14 DIAGNOSIS — G43.909 MIGRAINE WITHOUT STATUS MIGRAINOSUS, NOT INTRACTABLE, UNSPECIFIED MIGRAINE TYPE: ICD-10-CM

## 2020-03-16 RX ORDER — SUMATRIPTAN 25 MG/1
TABLET, FILM COATED ORAL
Qty: 9 TABLET | Refills: 0 | Status: SHIPPED | OUTPATIENT
Start: 2020-03-16 | End: 2020-07-17

## 2020-04-23 ENCOUNTER — TELEMEDICINE (OUTPATIENT)
Dept: FAMILY MEDICINE CLINIC | Facility: CLINIC | Age: 60
End: 2020-04-23

## 2020-04-23 VITALS — TEMPERATURE: 97.1 F

## 2020-04-23 DIAGNOSIS — L03.312 CELLULITIS OF LOWER BACK: Primary | ICD-10-CM

## 2020-04-23 PROCEDURE — 99213 OFFICE O/P EST LOW 20 MIN: CPT | Performed by: PHYSICIAN ASSISTANT

## 2020-04-23 RX ORDER — CEFUROXIME AXETIL 250 MG/1
250 TABLET ORAL 2 TIMES DAILY
Qty: 14 TABLET | Refills: 0 | Status: SHIPPED | OUTPATIENT
Start: 2020-04-23 | End: 2020-06-04

## 2020-04-23 NOTE — PROGRESS NOTES
Subjective   Allyson Wilder is a 59 y.o. female presents for   Chief Complaint   Patient presents with   • Rash     back       History of Present Illness     Allyson is a 59-year-old female who presents using telehealth/MyChart encounter today.  Had difficulty with the Zoom technology.  She could see me but I could not see her.  We did have the audio but not the visual hookup on her end.  She sent images of her rash via cell phone for my review.  States she was in the shower last night and noticed a burning sensation along her lower back area.  She got out of the shower and noticed that the area was red.  States the area stretches from left hip to her right hip.  It crosses the midline.  She did not notice any blisters.  The area is sensitive to touch.  She had been working out in the yard/garden during the daytime.  Unsure if she was bitten by an insect or not.  No fevers or chills noted.  States she has had shingles in past.  No change in detergents, lotions, soaps, or foods.  She has not used any medications to the area.    The following portions of the patient's history were reviewed and updated as appropriate: allergies, current medications, past family history, past medical history, past social history, past surgical history and problem list.    Review of Systems   Constitutional: Negative.  Negative for chills and fatigue.   HENT: Negative.    Eyes: Negative.    Respiratory: Negative.    Cardiovascular: Negative.    Gastrointestinal: Negative.    Endocrine: Negative.    Genitourinary: Negative.    Musculoskeletal: Negative.    Skin: Positive for color change and rash.   Allergic/Immunologic: Negative.    Neurological: Negative.    Hematological: Negative.    Psychiatric/Behavioral: Negative.    All other systems reviewed and are negative.    Only vital signs obtained was temperature which was 97.1.  This was taken by patient at home.  This was due to telehealth medicine encounter    Vitals:    04/23/20 1635    Temp: 97.1 °F (36.2 °C)   TempSrc: Oral     Wt Readings from Last 3 Encounters:   10/02/19 58.1 kg (128 lb)   19 59.1 kg (130 lb 6.4 oz)   19 58.1 kg (128 lb)     BP Readings from Last 3 Encounters:   10/02/19 122/82   19 130/84   19 118/72     Social History     Socioeconomic History   • Marital status:      Spouse name: Not on file   • Number of children: 0   • Years of education: college grad   • Highest education level: Not on file   Occupational History   • Occupation:      Comment: RETIRED   • Occupation: [employer]   Tobacco Use   • Smoking status: Former Smoker     Packs/day: 0.50     Years: 10.00     Pack years: 5.00     Types: Cigarettes     Start date:      Last attempt to quit:      Years since quittin.3   • Smokeless tobacco: Never Used   • Tobacco comment: exercise daily   Substance and Sexual Activity   • Alcohol use: Yes     Alcohol/week: 14.0 standard drinks     Types: 14 Glasses of wine per week   • Drug use: No   • Sexual activity: Not Currently       Allergies   Allergen Reactions   • Dm-Guaifenesin Er      Other reaction(s): Dizziness   • Sulfamethoxazole-Trimethoprim Nausea Only   • Topiramate GI Intolerance     Other reaction(s): Hallucinations   • Nitrofurantoin Rash       There is no height or weight on file to calculate BMI.    Objective   Physical Exam   Constitutional:   Alert and oriented x3 female in no apparent respiratory distress   HENT:   Right Ear: Hearing normal.   Left Ear: Hearing normal.   Pulmonary/Chest: Breath sounds normal.   No breathing difficulties during the telehealth visit   Neurological: She is alert.   Skin: Rash noted.        Psychiatric: She has a normal mood and affect. Her speech is normal and behavior is normal. Judgment and thought content normal. Cognition and memory are normal.     Physical exam was limited due to telehealth/MyChart video encounter.  Assessment/Plan   Allyson  was seen today for rash.    Diagnoses and all orders for this visit:    Cellulitis of lower back  -     cefuroxime (Ceftin) 250 MG tablet; Take 1 tablet by mouth 2 (Two) Times a Day.      Ms. Allyson Wilder was seen using telehealth/MyChart encounter today.  I was able to visually see her rash via download of pictures on [email or web address] messaging.  She was diagnosed with cellulitis of her lower back.  I have sent Ceftin antibiotic to her pharmacy.  She had requested low dose antibiotic due to sensitivity to medication.  I have asked her to apply topical lidocaine/aloe vera to the area as well.  She will give update status in a few days.  She voiced understanding.    I spent approximately 19 minutes discussing her signs, symptoms and obtaining visual imaging of her rash.    INESSA [place] [facility]  [address] [place] [id number]  Dept: [phone]  Dept Fax: [phone]  Loc: [phone]  Loc Fax: [phone]

## 2020-05-04 ENCOUNTER — HOSPITAL ENCOUNTER (OUTPATIENT)
Dept: PHYSICAL THERAPY | Facility: HOSPITAL | Age: 60
Setting detail: THERAPIES SERIES
Discharge: HOME | End: 2020-05-04

## 2020-05-04 DIAGNOSIS — M70.61 TROCHANTERIC BURSITIS OF BOTH HIPS: Primary | ICD-10-CM

## 2020-05-04 DIAGNOSIS — M70.62 TROCHANTERIC BURSITIS OF BOTH HIPS: Primary | ICD-10-CM

## 2020-05-04 PROCEDURE — 97110 THERAPEUTIC EXERCISES: CPT | Performed by: PHYSICAL THERAPIST

## 2020-05-04 NOTE — THERAPY TREATMENT NOTE
Outpatient Physical Therapy Ortho Treatment Note  SANDRA Hanks     Patient Name: Allyson Wilder  : 1960  MRN: [id number]  Today's Date: 2020      Visit Date: 2020    Visit Dx:    ICD-10-CM ICD-9-CM   1. Trochanteric bursitis of both hips M70.61 726.5    M70.62        Patient Active Problem List   Diagnosis   • Bursitis of hip   • Narrowing of intervertebral disc space   • Elevated hemoglobin A1c   • Headache   • Menopausal flushing   • Insomnia   • Migraine   • Celiac disease   • Health care maintenance   • Hyperglycemia   • Lymphocytic colitis   • Du's esophagus determined by biopsy   • Postmenopausal HRT (hormone replacement therapy)   • Trochanteric bursitis of both hips   • SI (sacroiliac) joint inflammation (CMS/HCC)   • Back pain with left-sided sciatica   • Cellulitis of lower back        Past Medical History:   Diagnosis Date   • Bursitis of hip    • Celiac disease    • Heart murmur     FUNCTIONAL   • Insomnia    • Migraine    • Postmenopausal HRT (hormone replacement therapy)         Past Surgical History:   Procedure Laterality Date   • COLONOSCOPY     • ESOPHAGOSCOPY / EGD         PT Ortho     Row Name 20 0800       Subjective Comments    Subjective Comments  Pt c/o right SI area pain with bilateral greater trochanter pain. She c/o pain with getting out of bed, rolling over, bending, and squatting.  -GC       Subjective Pain    Able to rate subjective pain?  yes  -GC    Pre-Treatment Pain Level  6  -GC    Subjective Pain Comment  Pt states her pain was a 9/10 when she woke up this morning and tried to get out of bed  -GC       Lumbar/SI Special Tests    Stork Test (SI Dysfunction)  Right:;Positive  -GC    SLR (Neural Tension)  Bilateral:;Negative  -GC       Lumbosacral Palpation    SI  Right:;Tender left on left anterior sacral torsion noted  -GC    Lumbosacral Segment  Tender L5/S1 with ERS right noted  -GC       Hip/Thigh Palpation    Greater Trochanter  Bilateral:;Tender   -GC    SI  Right:;Tender  -GC       Hip Special Tests    DARÍO (hip vs SI pathology)  Bilateral:;Positive  -GC       Head/Neck/Trunk    Trunk Extension AROM  WFL with pain at right SI  -GC    Trunk Flexion AROM  75% range with pain at right SI  -GC    Trunk Lt Lateral Flexion AROM  75% range with pain at right SI  -GC    Trunk Rt Lateral Flexion AROM  WFL  -GC    Trunk Lt Rotation AROM  WFL  -GC    Trunk Rt Rotation AROM  WFL  -GC       MMT Right Lower Ext    Rt Hip Flexion MMT, Gross Movement  (5/5) normal  -GC    Rt Hip Extension MMT, Gross Movement  (5/5) normal  -GC    Rt Hip ABduction MMT, Gross Movement  (5/5) normal  -GC    Rt Hip ADduction MMT, Gross Movement  (5/5) normal  -GC    Rt Hip Internal (Medial) Rotation MMT, Gross Movement  (4+/5) good plus  -GC    Rt Hip External (Lateral) Rotation MMT, Gross Movement  (4+/5) good plus  -GC    Rt Knee Extension MMT, Gross Movement  (5/5) normal  -GC    Rt Knee Flexion MMT, Gross Movement  (5/5) normal  -GC       MMT Left Lower Ext    Lt Hip Flexion MMT, Gross Movement  (5/5) normal  -GC    Lt Hip Extension MMT, Gross Movement  (5/5) normal  -GC    Lt Hip ABduction MMT, Gross Movement  (5/5) normal  -GC    Lt Hip ADduction MMT, Gross Movement  (5/5) normal  -GC    Lt Hip Internal (Medial) Rotation MMT, Gross Movement  (4+/5) good plus  -GC    Lt Hip External (Lateral) Rotation MMT, Gross Movement  (4+/5) good plus  -GC    Lt Knee Extension MMT, Gross Movement  (5/5) normal  -GC    Lt Knee Flexion MMT, Gross Movement  (5/5) normal  -GC       Lower Extremity Flexibility    Hamstrings  Bilateral:;Mildly limited  -GC    Hip Flexors  Bilateral:;WNL  -GC    Quadriceps  Bilateral:;WNL  -GC    ITB  Bilateral:;WNL  -GC    Hip External Rotators  Bilateral:;Mildly limited  -GC    Hip Internal Rotators  Bilateral:;Mildly limited  -GC       Transfers    Comment (Transfers)  Pt has pain going sit to/from supine and rolling supine to/from prone  -GC       Gait/Stairs  Assessment/Training    Comment (Gait/Stairs)  Pt ambulates with mild antalgic gait with decreased stride length on right compared to left  -      User Key  (r) = Recorded By, (t) = Taken By, (c) = Cosigned By    Initials Name Provider Type     Filiberto Schultz, PT Physical Therapist                      PT Assessment/Plan     Row Name 05/04/20 0800          PT Assessment    Functional Limitations  Impaired gait;Limitation in home management;Limitations in community activities;Limitations in functional capacity and performance;Performance in leisure activities;Performance in self-care ADL  -     Impairments  Gait;Impaired flexibility;Pain;Muscle strength;Joint mobility  -     Assessment Comments  Feel the patient's hip pain may be due to her malalignment in her LS and SI areas. She responded well to treatment today with better alignment after treatment and a negative stork test after treatment with increased trunk FLEX range  -     Rehab Potential  Good  -     Patient/caregiver participated in establishment of treatment plan and goals  Yes  -     Patient would benefit from skilled therapy intervention  Yes  -        PT Plan    PT Frequency  1x/week;2x/week  -     Predicted Duration of Therapy Intervention (Therapy Eval)  4 weeks  -     PT Plan Comments  Pt is to continue her HEP 2-3x daily. Will re-ck later this week.  -       User Key  (r) = Recorded By, (t) = Taken By, (c) = Cosigned By    Initials Name Provider Type    Filiberto Caledra PT Physical Therapist            OP Exercises     Row Name 05/04/20 0800             Subjective Comments    Subjective Comments  Pt c/o right SI area pain with bilateral greater trochanter pain. She c/o pain with getting out of bed, rolling over, bending, and squatting.  -         Subjective Pain    Able to rate subjective pain?  yes  -      Pre-Treatment Pain Level  6  -      Subjective Pain Comment  Pt states her pain was a 9/10 when she woke up this  morning and tried to get out of bed  -GC         Exercise 1    Exercise Name 1  Hamstring stretch with ADD-bilateral  -GC      Cueing 1  Verbal;Tactile  -GC      Reps 1  15  -GC      Time 1  10 secs  -GC         Exercise 2    Exercise Name 2  Piriformis stretch-bilateral  -GC      Cueing 2  Verbal;Tactile  -GC      Reps 2  15  -GC      Time 2  10 secs  -GC         Exercise 3    Exercise Name 3  MET for ERS right at L5/S1 and left on left anterior sacral torsion  -GC      Cueing 3  Verbal;Tactile  -GC      Time 3  3 min  -GC         Exercise 4    Exercise Name 4  Step standing FLEX on right  -GC      Cueing 4  Verbal;Demo  -GC      Reps 4  10  -GC      Time 4  10 secs  -GC        User Key  (r) = Recorded By, (t) = Taken By, (c) = Cosigned By    Initials Name Provider Type    GC Filiberto Schultz, PT Physical Therapist                       PT OP Goals     Row Name 05/04/20 0800          PT Short Term Goals    STG Date to Achieve  05/18/20  -     STG 1  Decrease bilateral hip pain and LBP to 3-4/10 with activity.  -     STG 2  Increase hamstring flexibility to WFL with testing.  -     STG 3  Increase piriformis flexibility to WFL with testing.  -     STG 4  Pt will be independent with her HEP issued by this therapist.  -        Long Term Goals    LTG Date to Achieve  06/01/20  -     LTG 1  Decrease bilateral hip pain and LBP to 0-1/10 with activity.  -     LTG 2  Increase trunk AROM to WFL all planes with testing.  -     LTG 3  Increase bilateral hip strength to 5/5 all planes with testing.  -     LTG 4  Pt will be independent with all ADLs without pain.  -        Time Calculation    PT Goal Re-Cert Due Date  06/01/20  -       User Key  (r) = Recorded By, (t) = Taken By, (c) = Cosigned By    Initials Name Provider Type    GC Filiberto Shcultz PT Physical Therapist                         Time Calculation:   Start Time: 0800  Stop Time: 0838  Time Calculation (min): 38 min  Therapy Charges for Today      Code Description Service Date Service Provider Modifiers Qty    15226775368 HC PT THER PROC EA 15 MIN 5/4/2020 Filiberto Schultz, PT GP 1                    Filiberto Schultz, PT  5/4/2020

## 2020-05-06 ENCOUNTER — HOSPITAL ENCOUNTER (OUTPATIENT)
Dept: PHYSICAL THERAPY | Facility: HOSPITAL | Age: 60
Setting detail: THERAPIES SERIES
Discharge: HOME | End: 2020-05-06

## 2020-05-06 DIAGNOSIS — M70.61 TROCHANTERIC BURSITIS OF BOTH HIPS: Primary | ICD-10-CM

## 2020-05-06 DIAGNOSIS — M70.62 TROCHANTERIC BURSITIS OF BOTH HIPS: Primary | ICD-10-CM

## 2020-05-06 PROCEDURE — 97110 THERAPEUTIC EXERCISES: CPT | Performed by: PHYSICAL THERAPIST

## 2020-05-06 NOTE — THERAPY TREATMENT NOTE
Outpatient Physical Therapy Ortho Treatment Note  SANDRA Hanks     Patient Name: Allyson Wilder  : 1960  MRN: [id number]  Today's Date: 2020      Visit Date: 2020    Visit Dx:    ICD-10-CM ICD-9-CM   1. Trochanteric bursitis of both hips M70.61 726.5    M70.62        Patient Active Problem List   Diagnosis   • Bursitis of hip   • Narrowing of intervertebral disc space   • Elevated hemoglobin A1c   • Headache   • Menopausal flushing   • Insomnia   • Migraine   • Celiac disease   • Health care maintenance   • Hyperglycemia   • Lymphocytic colitis   • Du's esophagus determined by biopsy   • Postmenopausal HRT (hormone replacement therapy)   • Trochanteric bursitis of both hips   • SI (sacroiliac) joint inflammation (CMS/HCC)   • Back pain with left-sided sciatica   • Cellulitis of lower back        Past Medical History:   Diagnosis Date   • Bursitis of hip    • Celiac disease    • Heart murmur     FUNCTIONAL   • Insomnia    • Migraine    • Postmenopausal HRT (hormone replacement therapy)         Past Surgical History:   Procedure Laterality Date   • COLONOSCOPY     • ESOPHAGOSCOPY / EGD         PT Ortho     Row Name 20 0755       Subjective Comments    Subjective Comments  Pt states she felt quite a bit better after her visit on Monday, but she woke up with considerable pain this morning and she states the step standing exercise is difficult and painful for her to complete at home.  -GC       Lumbosacral Palpation    SI  Right:;Tender left on left anterior sacral torsion noted  -GC    Lumbosacral Segment  Tender L5/S1 ERS right noted  -GC       Head/Neck/Trunk    Trunk Extension AROM  75% range with pain  -GC    Trunk Flexion AROM  75% range with pain  -GC    Trunk Lt Lateral Flexion AROM  75% range with pain  -GC    Trunk Rt Lateral Flexion AROM  WFL  -GC    Trunk Lt Rotation AROM  WFL  -GC    Trunk Rt Rotation AROM  WFL  -GC      User Key  (r) = Recorded By, (t) = Taken By, (c) =  Cosigned By    Initials Name Provider Type    Filiberto Caldera, PT Physical Therapist                      PT Assessment/Plan     Row Name 05/06/20 6621          PT Assessment    Assessment Comments  Pt is responding well to therapy with decreased c/o pain after her treatment. She tolerated the core strengthening exercises well.  -GC        PT Plan    PT Plan Comments  Pt is to continue her HEP 2-3x daily.  -GC       User Key  (r) = Recorded By, (t) = Taken By, (c) = Cosigned By    Initials Name Provider Type    Filiberto Caldera, PT Physical Therapist            OP Exercises     Row Name 05/06/20 3473             Subjective Comments    Subjective Comments  Pt states she felt quite a bit better after her visit on Monday, but she woke up with considerable pain this morning and she states the step standing exercise is difficult and painful for her to complete at home.  -GC         Exercise 1    Exercise Name 1  Hamstring stretch with ADD-bilateral  -GC      Cueing 1  Verbal;Tactile  -GC      Reps 1  15  -GC      Time 1  10 secs  -GC         Exercise 2    Exercise Name 2  Piriformis stretch-bilateral  -GC      Cueing 2  Verbal;Tactile  -GC      Reps 2  15  -GC      Time 2  10 secs  -GC         Exercise 3    Exercise Name 3  MET for ERS right at L5/S1 and left on left anterior sacral torsion  -GC      Cueing 3  Verbal;Tactile  -GC      Time 3  3 min  -GC         Exercise 4    Exercise Name 4  SKTC on right  -GC      Cueing 4  Verbal;Demo  -GC      Reps 4  10  -GC      Time 4  10 secs  -GC         Exercise 5    Exercise Name 5  PPT with LE marching  -GC      Cueing 5  Verbal;Tactile  -GC      Reps 5  30  -GC         Exercise 6    Exercise Name 6  PPT with LE EXT  -GC      Cueing 6  Verbal;Tactile  -GC      Reps 6  30  -GC        User Key  (r) = Recorded By, (t) = Taken By, (c) = Cosigned By    Initials Name Provider Type    Filiberto Caldera, PT Physical Therapist                                          Time  Calculation:   Start Time: 0755  Stop Time: 0826  Time Calculation (min): 31 min  Therapy Charges for Today     Code Description Service Date Service Provider Modifiers Qty    68959342829  PT THER PROC EA 15 MIN 5/6/2020 Filiberto Schultz, PT GP 1                    Filiberto Schultz, PT  5/6/2020

## 2020-05-11 ENCOUNTER — HOSPITAL ENCOUNTER (OUTPATIENT)
Dept: PHYSICAL THERAPY | Facility: HOSPITAL | Age: 60
Setting detail: THERAPIES SERIES
Discharge: HOME | End: 2020-05-11

## 2020-05-11 ENCOUNTER — TELEPHONE (OUTPATIENT)
Dept: FAMILY MEDICINE CLINIC | Facility: CLINIC | Age: 60
End: 2020-05-11

## 2020-05-11 DIAGNOSIS — M99.79 NARROWING OF INTERVERTEBRAL DISC SPACE: ICD-10-CM

## 2020-05-11 DIAGNOSIS — M46.1 SI (SACROILIAC) JOINT INFLAMMATION: ICD-10-CM

## 2020-05-11 DIAGNOSIS — M70.62 TROCHANTERIC BURSITIS OF BOTH HIPS: Primary | ICD-10-CM

## 2020-05-11 DIAGNOSIS — M54.32 BACK PAIN WITH LEFT-SIDED SCIATICA: Primary | ICD-10-CM

## 2020-05-11 DIAGNOSIS — M70.61 TROCHANTERIC BURSITIS OF BOTH HIPS: Primary | ICD-10-CM

## 2020-05-11 PROCEDURE — 97110 THERAPEUTIC EXERCISES: CPT | Performed by: PHYSICAL THERAPIST

## 2020-05-11 NOTE — TELEPHONE ENCOUNTER
PATIENT CALLED TO REQUEST AN MRI  HAS BEEN DOING PT FOR 5-6 MONTHS AND IS NOT WORKING, PAIN IS ON A 5-6 ON A DAILY BASIS, OTHER TIMES IN 8-9    PLEASE ADVISE.

## 2020-05-11 NOTE — THERAPY TREATMENT NOTE
Outpatient Physical Therapy Ortho Treatment Note  SANDRA Hanks     Patient Name: Allyson Wilder  : 1960  MRN: [id number]  Today's Date: 2020      Visit Date: 2020    Visit Dx:    ICD-10-CM ICD-9-CM   1. Trochanteric bursitis of both hips M70.61 726.5    M70.62        Patient Active Problem List   Diagnosis   • Bursitis of hip   • Narrowing of intervertebral disc space   • Elevated hemoglobin A1c   • Headache   • Menopausal flushing   • Insomnia   • Migraine   • Celiac disease   • Health care maintenance   • Hyperglycemia   • Lymphocytic colitis   • Du's esophagus determined by biopsy   • Postmenopausal HRT (hormone replacement therapy)   • Trochanteric bursitis of both hips   • SI (sacroiliac) joint inflammation (CMS/HCC)   • Back pain with left-sided sciatica   • Cellulitis of lower back        Past Medical History:   Diagnosis Date   • Bursitis of hip    • Celiac disease    • Heart murmur     FUNCTIONAL   • Insomnia    • Migraine    • Postmenopausal HRT (hormone replacement therapy)         Past Surgical History:   Procedure Laterality Date   • COLONOSCOPY     • ESOPHAGOSCOPY / EGD         PT Ortho     Row Name 20 0800       Subjective Comments    Subjective Comments  Pt states she felt real good after her last visit, but over the weekend her pain increased again significantly.  -      User Key  (r) = Recorded By, (t) = Taken By, (c) = Cosigned By    Initials Name Provider Type    Filiberto Caldera PT Physical Therapist                      PT Assessment/Plan     Row Name 20 0800          PT Assessment    Assessment Comments  Feel pt needs additional strengthening/stabilization to hold corrected position. She is responding to the mobilizations well.  -        PT Plan    PT Plan Comments  Pt is to continue her HEP 2-3x daily.  -       User Key  (r) = Recorded By, (t) = Taken By, (c) = Cosigned By    Initials Name Provider Type    Filiberto Caldera PT Physical Therapist             OP Exercises     Row Name 05/11/20 0800             Subjective Comments    Subjective Comments  Pt states she felt real good after her last visit, but over the weekend her pain increased again significantly.  -GC         Exercise 1    Exercise Name 1  Hamstring stretch with ADD-bilateral  -GC      Cueing 1  Verbal;Tactile  -GC      Reps 1  15  -GC      Time 1  10 secs  -GC         Exercise 2    Exercise Name 2  Piriformis stretch-bilateral  -GC      Cueing 2  Verbal;Tactile  -GC      Reps 2  15  -GC      Time 2  10 secs  -GC         Exercise 3    Exercise Name 3  MET for ERS right at L5/S1 and left on left anterior sacral torsion  -GC      Cueing 3  Verbal;Tactile  -GC      Time 3  3 min  -GC         Exercise 4    Exercise Name 4  SKTC on right  -GC      Cueing 4  Verbal;Demo  -GC      Reps 4  10  -GC      Time 4  10 secs  -GC         Exercise 5    Exercise Name 5  PPT with LE marching  -GC      Cueing 5  Verbal;Tactile  -GC      Reps 5  40  -GC         Exercise 6    Exercise Name 6  PPT with LE EXT  -GC      Cueing 6  Verbal;Tactile  -GC      Reps 6  40  -GC         Exercise 7    Exercise Name 7  Supine clam shell  -GC      Cueing 7  Verbal;Tactile  -GC      Reps 7  30  -GC      Time 7  silver  -GC        User Key  (r) = Recorded By, (t) = Taken By, (c) = Cosigned By    Initials Name Provider Type     Filiberto Schultz, PT Physical Therapist                                          Time Calculation:   Start Time: 0800  Stop Time: 0838  Time Calculation (min): 38 min  Therapy Charges for Today     Code Description Service Date Service Provider Modifiers Qty    98561116826 HC PT THER PROC EA 15 MIN 5/11/2020 Filiberto Schultz, PT GP 2                    Filiberto Schultz, PT  5/11/2020

## 2020-05-13 ENCOUNTER — HOSPITAL ENCOUNTER (OUTPATIENT)
Dept: PHYSICAL THERAPY | Facility: HOSPITAL | Age: 60
Setting detail: THERAPIES SERIES
Discharge: HOME | End: 2020-05-13

## 2020-05-13 DIAGNOSIS — M70.62 TROCHANTERIC BURSITIS OF BOTH HIPS: Primary | ICD-10-CM

## 2020-05-13 DIAGNOSIS — M70.61 TROCHANTERIC BURSITIS OF BOTH HIPS: Primary | ICD-10-CM

## 2020-05-13 PROCEDURE — 97110 THERAPEUTIC EXERCISES: CPT | Performed by: PHYSICAL THERAPIST

## 2020-05-13 NOTE — THERAPY TREATMENT NOTE
Outpatient Physical Therapy Ortho Treatment Note  SANDRA Hanks     Patient Name: Allyson Wilder  : 1960  MRN: [id number]  Today's Date: 2020      Visit Date: 2020    Visit Dx:    ICD-10-CM ICD-9-CM   1. Trochanteric bursitis of both hips M70.61 726.5    M70.62        Patient Active Problem List   Diagnosis   • Bursitis of hip   • Narrowing of intervertebral disc space   • Elevated hemoglobin A1c   • Headache   • Menopausal flushing   • Insomnia   • Migraine   • Celiac disease   • Health care maintenance   • Hyperglycemia   • Lymphocytic colitis   • Du's esophagus determined by biopsy   • Postmenopausal HRT (hormone replacement therapy)   • Trochanteric bursitis of both hips   • SI (sacroiliac) joint inflammation (CMS/HCC)   • Back pain with left-sided sciatica   • Cellulitis of lower back        Past Medical History:   Diagnosis Date   • Bursitis of hip    • Celiac disease    • Heart murmur     FUNCTIONAL   • Insomnia    • Migraine    • Postmenopausal HRT (hormone replacement therapy)         Past Surgical History:   Procedure Laterality Date   • COLONOSCOPY     • ESOPHAGOSCOPY / EGD                         PT Assessment/Plan     Row Name 20 1250          PT Assessment    Assessment Comments  Pt is responding well to therapy with decreased c/o pain and improving function.  -GC        PT Plan    PT Plan Comments  Pt is to continue her HEP daily.  -GC       User Key  (r) = Recorded By, (t) = Taken By, (c) = Cosigned By    Initials Name Provider Type    Filiberto Caldera, PT Physical Therapist            OP Exercises     Row Name 20 1250             Subjective Comments    Subjective Comments  Pt states this is the best day she has had in a while.  -GC         Exercise 1    Exercise Name 1  Hamstring stretch with ADD-bilateral  -GC      Cueing 1  Verbal;Tactile  -GC      Reps 1  15  -GC      Time 1  10 secs  -GC         Exercise 2    Exercise Name 2  Piriformis stretch-bilateral   -GC      Cueing 2  Verbal;Tactile  -GC      Reps 2  15  -GC      Time 2  10 secs  -GC         Exercise 3    Exercise Name 3  MET for ERS right at L5/S1 and left on left anterior sacral torsion  -GC      Cueing 3  Verbal;Tactile  -GC      Time 3  3 min  -GC         Exercise 4    Exercise Name 4  SKTC on right  -GC      Cueing 4  Verbal;Demo  -GC      Reps 4  10  -GC      Time 4  10 secs  -GC         Exercise 5    Exercise Name 5  PPT with LE marching  -GC      Cueing 5  Verbal;Tactile  -GC      Reps 5  40  -GC         Exercise 6    Exercise Name 6  PPT with LE EXT  -GC      Cueing 6  Verbal;Tactile  -GC      Reps 6  40  -GC         Exercise 7    Exercise Name 7  Supine clam shell  -GC      Cueing 7  Verbal;Tactile  -GC      Reps 7  30  -GC      Time 7  silver  -GC        User Key  (r) = Recorded By, (t) = Taken By, (c) = Cosigned By    Initials Name Provider Type    GC Filiberto Schultz, PT Physical Therapist                                          Time Calculation:   Start Time: 1250  Stop Time: 1322  Time Calculation (min): 32 min  Therapy Charges for Today     Code Description Service Date Service Provider Modifiers Qty    03372162122 HC PT THER PROC EA 15 MIN 5/13/2020 Filiberto Schultz, PT GP 1                    Filiberto Schultz PT  5/13/2020

## 2020-05-18 ENCOUNTER — APPOINTMENT (OUTPATIENT)
Dept: PHYSICAL THERAPY | Facility: HOSPITAL | Age: 60
End: 2020-05-18

## 2020-05-20 ENCOUNTER — HOSPITAL ENCOUNTER (OUTPATIENT)
Dept: PHYSICAL THERAPY | Facility: HOSPITAL | Age: 60
Setting detail: THERAPIES SERIES
Discharge: HOME | End: 2020-05-20

## 2020-05-20 DIAGNOSIS — M70.62 TROCHANTERIC BURSITIS OF BOTH HIPS: Primary | ICD-10-CM

## 2020-05-20 DIAGNOSIS — M70.61 TROCHANTERIC BURSITIS OF BOTH HIPS: Primary | ICD-10-CM

## 2020-05-20 PROCEDURE — G0283 ELEC STIM OTHER THAN WOUND: HCPCS | Performed by: PHYSICAL THERAPIST

## 2020-05-20 PROCEDURE — 97110 THERAPEUTIC EXERCISES: CPT | Performed by: PHYSICAL THERAPIST

## 2020-05-20 NOTE — THERAPY TREATMENT NOTE
Outpatient Physical Therapy Ortho Treatment Note  SANDRA Hanks     Patient Name: Allyson Wilder  : 1960  MRN: [id number]  Today's Date: 2020      Visit Date: 2020    Visit Dx:    ICD-10-CM ICD-9-CM   1. Trochanteric bursitis of both hips M70.61 726.5    M70.62        Patient Active Problem List   Diagnosis   • Bursitis of hip   • Narrowing of intervertebral disc space   • Elevated hemoglobin A1c   • Headache   • Menopausal flushing   • Insomnia   • Migraine   • Celiac disease   • Health care maintenance   • Hyperglycemia   • Lymphocytic colitis   • Du's esophagus determined by biopsy   • Postmenopausal HRT (hormone replacement therapy)   • Trochanteric bursitis of both hips   • SI (sacroiliac) joint inflammation (CMS/HCC)   • Back pain with left-sided sciatica   • Cellulitis of lower back        Past Medical History:   Diagnosis Date   • Bursitis of hip    • Celiac disease    • Heart murmur     FUNCTIONAL   • Insomnia    • Migraine    • Postmenopausal HRT (hormone replacement therapy)         Past Surgical History:   Procedure Laterality Date   • COLONOSCOPY     • ESOPHAGOSCOPY / EGD         PT Ortho     Row Name 20 1115       Subjective Comments    Subjective Comments  Pt c/o increased pain at her right SI. She says it has been worse the last several days.  -GC       Lumbosacral Accessory Motions    PA Glide- L5  Hypomobile  -GC    PA glide- Sacral base  Hypomobile  -GC       Lumbar/SI Special Tests    Stork Test (SI Dysfunction)  Right:;Positive;Left:;Negative  -GC    SLR (Neural Tension)  Bilateral:;Negative  -GC       Lumbosacral Palpation    SI  Right:;Tender right on left posterior sacral torsion noted  -GC    Lumbosacral Segment  Tender FRS left at L5/S1  -GC       Hip Special Tests    DARÍO (hip vs SI pathology)  Right:;Positive;Left:;Negative  -GC       Head/Neck/Trunk    Trunk Extension AROM  50% range with pain  -GC    Trunk Flexion AROM  75% range with pain  -GC    Trunk  Lt Lateral Flexion AROM  WFL  -GC    Trunk Rt Lateral Flexion AROM  WFL  -GC    Trunk Lt Rotation AROM  WFL  -GC    Trunk Rt Rotation AROM  WFL  -GC      User Key  (r) = Recorded By, (t) = Taken By, (c) = Cosigned By    Initials Name Provider Type    GC Filiberto Schultz, PT Physical Therapist                      PT Assessment/Plan     Row Name 05/20/20 1115          PT Assessment    Assessment Comments  Pt had decreased pain, increased trunk extension range, and better alignment after her treatment today.  -GC        PT Plan    PT Plan Comments  Pt is to continu eher HEP 2-3x daily.  -GC       User Key  (r) = Recorded By, (t) = Taken By, (c) = Cosigned By    Initials Name Provider Type     Filiberto Schultz, PT Physical Therapist          Modalities     Row Name 05/20/20 1115             Moist Heat    MH Applied  Yes  -GC      Location  right SI area with IFC with pt prone over one pillow  -GC      Rx Minutes  15 mins  -GC      MH Prior to Rx  Yes  -GC         ELECTRICAL STIMULATION    Attended/Unattended  Unattended  -GC      Stimulation Type  IFC  -GC      Location/Electrode Placement/Other  right SI area with MH with pt prone over 1 pillow  -GC       PT E-Stim Unattended (Manual) Minutes  15  -GC        User Key  (r) = Recorded By, (t) = Taken By, (c) = Cosigned By    Initials Name Provider Type    Filiberto Caldera, PT Physical Therapist        OP Exercises     Row Name 05/20/20 1110             Subjective Comments    Subjective Comments  Pt c/o increased pain at her right SI. She says it has been worse the last several days.  -GC         Exercise 1    Exercise Name 1  Hamstring stretch with ADD-bilateral  -GC      Cueing 1  Verbal;Tactile  -GC      Reps 1  15  -GC      Time 1  10 secs  -GC         Exercise 2    Exercise Name 2  Piriformis stretch-bilateral  -GC      Cueing 2  Verbal;Tactile  -GC      Reps 2  15  -GC      Time 2  10 secs  -GC         Exercise 3    Exercise Name 3  MET for FRS left at L5/S1  and right on left posterior sacral torsion  -GC      Cueing 3  Verbal;Tactile  -GC      Time 3  3 min  -GC         Exercise 4    Exercise Name 4  Unilateral press up on right   -GC      Cueing 4  Verbal;Demo  -GC      Reps 4  15  -GC        User Key  (r) = Recorded By, (t) = Taken By, (c) = Cosigned By    Initials Name Provider Type    GC Filiberto Schultz, PT Physical Therapist                                          Time Calculation:   Start Time: 1115  Stop Time: 1152  Time Calculation (min): 37 min  Therapy Charges for Today     Code Description Service Date Service Provider Modifiers Qty    77779018184  PT ELECTRICAL STIM UNATTENDED 5/20/2020 Filiberto Schultz, PT  1    03275202807 HC PT THER PROC EA 15 MIN 5/20/2020 Filiberto Schultz, PT GP 1                    Filiberto Schultz PT  5/20/2020

## 2020-05-22 ENCOUNTER — HOSPITAL ENCOUNTER (OUTPATIENT)
Dept: PHYSICAL THERAPY | Facility: HOSPITAL | Age: 60
Setting detail: THERAPIES SERIES
Discharge: HOME | End: 2020-05-22

## 2020-05-22 ENCOUNTER — HOSPITAL ENCOUNTER (OUTPATIENT)
Dept: MRI IMAGING | Facility: HOSPITAL | Age: 60
Discharge: HOME | End: 2020-05-22
Admitting: FAMILY MEDICINE

## 2020-05-22 DIAGNOSIS — M70.61 TROCHANTERIC BURSITIS OF BOTH HIPS: Primary | ICD-10-CM

## 2020-05-22 DIAGNOSIS — M46.1 SI (SACROILIAC) JOINT INFLAMMATION (HCC): ICD-10-CM

## 2020-05-22 DIAGNOSIS — M99.79 NARROWING OF INTERVERTEBRAL DISC SPACE: ICD-10-CM

## 2020-05-22 DIAGNOSIS — M70.62 TROCHANTERIC BURSITIS OF BOTH HIPS: Primary | ICD-10-CM

## 2020-05-22 DIAGNOSIS — M54.32 BACK PAIN WITH LEFT-SIDED SCIATICA: ICD-10-CM

## 2020-05-22 PROCEDURE — G0283 ELEC STIM OTHER THAN WOUND: HCPCS | Performed by: PHYSICAL THERAPIST

## 2020-05-22 PROCEDURE — 72148 MRI LUMBAR SPINE W/O DYE: CPT

## 2020-05-22 PROCEDURE — 97110 THERAPEUTIC EXERCISES: CPT | Performed by: PHYSICAL THERAPIST

## 2020-05-26 ENCOUNTER — HOSPITAL ENCOUNTER (OUTPATIENT)
Dept: PHYSICAL THERAPY | Facility: HOSPITAL | Age: 60
Setting detail: THERAPIES SERIES
Discharge: HOME | End: 2020-05-26

## 2020-05-26 DIAGNOSIS — M71.38 SYNOVIAL CYST OF LUMBAR SPINE: ICD-10-CM

## 2020-05-26 DIAGNOSIS — M71.38 SYNOVIAL CYST OF LUMBAR SPINE: Primary | ICD-10-CM

## 2020-05-26 DIAGNOSIS — M70.62 TROCHANTERIC BURSITIS OF BOTH HIPS: Primary | ICD-10-CM

## 2020-05-26 DIAGNOSIS — M70.61 TROCHANTERIC BURSITIS OF BOTH HIPS: Primary | ICD-10-CM

## 2020-05-26 PROCEDURE — G0283 ELEC STIM OTHER THAN WOUND: HCPCS | Performed by: PHYSICAL THERAPIST

## 2020-05-26 PROCEDURE — 97110 THERAPEUTIC EXERCISES: CPT | Performed by: PHYSICAL THERAPIST

## 2020-05-26 RX ORDER — HYDROCODONE BITARTRATE AND ACETAMINOPHEN 5; 325 MG/1; MG/1
1 TABLET ORAL EVERY 4 HOURS PRN
Qty: 18 TABLET | Refills: 0 | Status: SHIPPED | OUTPATIENT
Start: 2020-05-26 | End: 2020-06-08

## 2020-05-26 RX ORDER — HYDROCODONE BITARTRATE AND ACETAMINOPHEN 5; 325 MG/1; MG/1
1 TABLET ORAL EVERY 4 HOURS PRN
Qty: 18 TABLET | Refills: 0 | Status: SHIPPED | OUTPATIENT
Start: 2020-05-26 | End: 2020-05-26 | Stop reason: SDUPTHER

## 2020-05-26 NOTE — THERAPY TREATMENT NOTE
Outpatient Physical Therapy Ortho Treatment Note  SANDRA Hanks     Patient Name: Allyson Wilder  : 1960  MRN: [id number]  Today's Date: 2020      Visit Date: 2020    Visit Dx:    ICD-10-CM ICD-9-CM   1. Trochanteric bursitis of both hips M70.61 726.5    M70.62        Patient Active Problem List   Diagnosis   • Bursitis of hip   • Narrowing of intervertebral disc space   • Elevated hemoglobin A1c   • Headache   • Menopausal flushing   • Insomnia   • Migraine   • Celiac disease   • Health care maintenance   • Hyperglycemia   • Lymphocytic colitis   • Du's esophagus determined by biopsy   • Postmenopausal HRT (hormone replacement therapy)   • Trochanteric bursitis of both hips   • SI (sacroiliac) joint inflammation (CMS/HCC)   • Back pain with left-sided sciatica   • Cellulitis of lower back        Past Medical History:   Diagnosis Date   • Bursitis of hip    • Celiac disease    • Heart murmur     FUNCTIONAL   • Insomnia    • Migraine    • Postmenopausal HRT (hormone replacement therapy)         Past Surgical History:   Procedure Laterality Date   • COLONOSCOPY     • ESOPHAGOSCOPY / EGD         PT Ortho     Row Name 20 08       Subjective Comments    Subjective Comments  Pt states she had one day where she did not need to take any medication at all. She says she is definitely getting better.  -GC       Lumbosacral Accessory Motions    Lumbosacral Accessory Motions Tested?  -- FRS left at L5/S1 and right on left post. sacral torsion  -GC    PA Glide- L5  Hypomobile  -GC    PA glide- Sacral base  Hypomobile  -GC       Head/Neck/Trunk    Trunk Extension AROM  75% range with end range pain  -GC    Trunk Flexion AROM  WFL with end range pain  -GC      User Key  (r) = Recorded By, (t) = Taken By, (c) = Cosigned By    Initials Name Provider Type    Filiberto Caldera, PT Physical Therapist                      PT Assessment/Plan     Row Name 20 0800          PT Assessment    Assessment  Comments  Pt is doing well with increased trunk ROM and decreased c/o pain.  -GC        PT Plan    PT Plan Comments  Pt is to continue her HEP daily.  -GC       User Key  (r) = Recorded By, (t) = Taken By, (c) = Cosigned By    Initials Name Provider Type    GC Filiberto Schultz, PT Physical Therapist          Modalities     Row Name 05/26/20 0800             Moist Heat    MH Applied  Yes  -GC      Location  right SI area with IFC with pt prone over one pillow  -GC      Rx Minutes  15 mins  -GC      MH Prior to Rx  Yes  -GC         ELECTRICAL STIMULATION    Attended/Unattended  Unattended  -GC      Stimulation Type  IFC  -GC      Location/Electrode Placement/Other  right SI area with MH with pt prone over 1 pillow  -GC       PT E-Stim Unattended (Manual) Minutes  15  -GC        User Key  (r) = Recorded By, (t) = Taken By, (c) = Cosigned By    Initials Name Provider Type    GC Filiberto Schultz, PT Physical Therapist        OP Exercises     Row Name 05/26/20 0800             Subjective Comments    Subjective Comments  Pt states she had one day where she did not need to take any medication at all. She says she is definitely getting better.  -GC         Exercise 1    Exercise Name 1  Hamstring stretch with ADD-bilateral  -GC      Cueing 1  Verbal;Tactile  -GC      Reps 1  15  -GC      Time 1  10 secs  -GC         Exercise 2    Exercise Name 2  Piriformis stretch-bilateral  -GC      Cueing 2  Verbal;Tactile  -GC      Reps 2  15  -GC      Time 2  10 secs  -GC         Exercise 3    Exercise Name 3  MET for FRS left at L5/S1 and right on left posterior sacral torsion  -GC      Cueing 3  Verbal;Tactile  -GC      Time 3  3 min  -GC         Exercise 4    Exercise Name 4  Unilateral press up on right   -GC      Cueing 4  Verbal;Demo  -GC      Reps 4  15  -GC         Exercise 5    Exercise Name 5  Prone hip EXT-alternately  -GC      Cueing 5  Verbal;Tactile  -GC      Reps 5  40  -GC         Exercise 6    Exercise Name 6  Prone  chest raise  -      Cueing 6  Verbal;Tactile  -      Reps 6  20  -GC        User Key  (r) = Recorded By, (t) = Taken By, (c) = Cosigned By    Initials Name Provider Type     Filiberto Schultz, PT Physical Therapist                                          Time Calculation:   Start Time: 0800  Stop Time: 0851  Time Calculation (min): 51 min  Therapy Charges for Today     Code Description Service Date Service Provider Modifiers Qty    [id number]  PT ELECTRICAL STIM UNATTENDED 5/26/2020 Filiberto Schultz, PT  1    [id number]  PT THER PROC EA 15 MIN 5/26/2020 Filiberto Schultz, PT GP 1                    Filiberto Schultz, PT  5/26/2020

## 2020-05-28 ENCOUNTER — TELEPHONE (OUTPATIENT)
Dept: FAMILY MEDICINE CLINIC | Facility: CLINIC | Age: 60
End: 2020-05-28

## 2020-05-28 NOTE — TELEPHONE ENCOUNTER
PATIENT CALLED IN AND STATED SHE NEEDS A CALL BACK FROM DOCTOR SISI . SHE IS IN A LOT OF PAIN AND INCREASING . PATIENT CALL BACK [phone].

## 2020-05-29 NOTE — TELEPHONE ENCOUNTER
S/w patient  She is scared to take the norco  And has not heard back from referral yet.    discussed

## 2020-06-03 NOTE — PROGRESS NOTES
Subjective   History of Present Illness: Allyson Wilder is a 59 y.o. female is being seen for consultation today at the request of Dav Palacios MD. for Synovial cyst of lumbar spine. She had an MRI L Spine 5.22.20 at [facility].      History of Present Illness  Today Ms. Wilder reports 1 year of right buttock and lateral hip and leg pain. She had several IM injections with minimal benefit. She did PT and had good relief during PT but had return of pain with ADLs but did slowly improve HEP. About 9 weeks ago, she began to have constant R>L leg pain that has multiple qualities- aching, burning, throbbing over the course of the day. She lies down with relief and she has been in bed up to 18 hours per day. It is associated with mood changes. She states that pain is predominantly on the right side from the waist down. She reports N/T but no significant weakness in BLE, R>L. She has trouble with sitting down on hard surfaces especially the toilet due to the excruciating pain. Today she reports her pain 7/10. She denies issues with her balance or gait. She denies issues with bowel or bladder incontinence. She reports some occasional neck pain which she initially thought was related to her migraines but believes its related to her back pain. No arm pain, tingling, or numbness.     The following portions of the patient's history were reviewed and updated as appropriate: allergies, current medications, past family history, past medical history, past social history, past surgical history and problem list.    Review of Systems   Constitutional: Positive for activity change and fatigue. Negative for chills and fever.   HENT: Negative for trouble swallowing.    Eyes: Negative for visual disturbance.   Cardiovascular: Negative for leg swelling.   Gastrointestinal: Positive for abdominal pain and nausea.   Genitourinary: Negative for difficulty urinating, frequency and urgency.   Musculoskeletal: Positive for back pain, neck  "pain and neck stiffness. Negative for gait problem.   Neurological: Positive for dizziness, weakness, light-headedness, numbness and headaches.   Hematological: Bruises/bleeds easily.   Psychiatric/Behavioral: Positive for sleep disturbance (unable to sleep due to pain). The patient is nervous/anxious (pain has changed her daily activity).        Objective     Vitals:    06/04/20 0815   BP: 135/90   Pulse: 82   Temp: 97.5 °F (36.4 °C)   Weight: 59.4 kg (131 lb)   Height: 162.6 cm (64\")     Body mass index is 22.49 kg/m².      Physical Exam   Constitutional: She appears well-developed and well-nourished.   Cardiovascular: Normal rate, regular rhythm and normal heart sounds.   Pulmonary/Chest: Effort normal and breath sounds normal.   Abdominal: Soft. Bowel sounds are normal. She exhibits no distension. There is no tenderness.   Musculoskeletal:        Lumbar back: She exhibits bony tenderness. She exhibits normal range of motion, no tenderness and no pain ( neg SLR).   Neurological: She is alert. She has normal strength. Gait normal.   Reflex Scores:       Tricep reflexes are 2+ on the right side.       Bicep reflexes are 3+ on the right side and 3+ on the left side.       Brachioradialis reflexes are 3+ on the right side and 3+ on the left side.       Patellar reflexes are 3+ on the right side and 2+ on the left side.       Achilles reflexes are 1+ on the right side and 1+ on the left side.  Skin: Skin is warm and dry.   Psychiatric: Her speech is normal. Judgment normal. Her mood appears anxious ( mildly). Cognition and memory are normal.   Vitals reviewed.    Neurologic Exam     Mental Status   Follows 3 step commands.   Attention: normal. Concentration: normal.   Speech: speech is normal   Level of consciousness: alert  Knowledge: good.   Normal comprehension.     Motor Exam   Muscle bulk: normal    Strength   Strength 5/5 throughout.     Sensory Exam   Light touch normal.     Gait, Coordination, and Reflexes "     Gait  Gait: normal    Reflexes   Right brachioradialis: 3+  Left brachioradialis: 3+  Right biceps: 3+  Left biceps: 3+  Right triceps: 2+  Right patellar: 3+  Left patellar: 2+  Right achilles: 1+  Left achilles: 1+  Right Mcmanus: absent  Left Mcmanus: absent  Right ankle clonus: absent  Left ankle clonus: absent  Able to heel and toe walk         Assessment/Plan   Independent Review of Radiographic Studies:      I personally reviewed the images from the following studies.    MRI lumbar spine reviewed by discussed with Dr. Huggins.  This reveals a large right-sided L4/5 synovial cyst with significant lateral recess stenosis.  There is also a smaller right-sided L3/4 synovial cyst as well as an S2 posterior Tarlov cyst.  She has facet arthropathy at L4/5.    Lumbar x-rays reveal normal alignment and no evidence of pars fracture.  Mild posterior bony changes in lower lumbar spine    Medical Decision Making:      Patient presents with 1 year of right leg pain significantly worse in the last 2+ months.  She has significantly withdrawn her activities and is quite saddened by her lack of activity.  She has taken very little pain medication as she is frightened of addiction although she has no addiction history.  She has tried conservative measures including physical therapy and IM steroid injections.  Recent MRI reveals the above findings with the most significant finding being a large right-sided L4/5 synovial cyst causing lateral recess and nerve compression.  On exam she has no weakness or sensory changes.  Negative straight leg raise.  She is generally hyperreflexive, but no upper extremity complaints, weakness, or imbalance noted on exam.  I discussed the films with Dr. Levin.  He concurs with radiology report.  He has spoken with the patient regarding minimally invasive laminectomy with removal of synovial cyst versus watchful waiting.  Epidural injections unlikely to give her benefit..  She is anxious to  proceed with surgery for resolution of pain.  I have offered her a prescription for Elavil to help with both her mood, sleep, and hopefully some with pain.  I did explain to her that it takes several days to help and may not be overly effective but she is willing to try.  Dr. Levin will add his addendum with regard to surgical discussion.  Patient will follow-up after surgery.      Allyson was seen today for back pain.    Diagnoses and all orders for this visit:    Chronic right-sided low back pain with right-sided sciatica  -     XR Spine Lumbar Complete With Flex & Ext; Future    Synovial cyst  -     XR Spine Lumbar Complete With Flex & Ext; Future    Other orders  -     amitriptyline (ELAVIL) 25 MG tablet; Take 1 tablet by mouth Every Night.      Return for Postop vist as scheduled.    This part is from Dr. Lind.  I did have a long discussion with the patient about the situation.  I explained that she does have a small synovial cyst at L3-4 and she may ultimately require a fusion in her lumbar spine but I think it is reasonable to consider surgery at L4-5.  I told the patient about the risks, complications and expected outcome of the lumbar surgery.  I explained that there was an 80% chance of getting rid of the pain in the leg.  I explained that there would still be back pain after the surgery.  Initially this will be quite severe but will improve over time.  There is a 2 or 3% chance of infection, bleeding, CSF leak, damage to the nerve as a result of surgery, paralysis, as well as anesthetic risk.  There is a 5% chance of late instability which could require a fusion later on and a 10% chance of recurrent disc herniation.  We discussed the postoperative hospital and home course.    She does asked to proceed and so she will need to be scheduled for a: Right lumbar 4 to lumbar 5 laminectomy with removal of synovial cyst using metrx

## 2020-06-03 NOTE — H&P (VIEW-ONLY)
Subjective   History of Present Illness: Allyson Wilder is a 59 y.o. female is being seen for consultation today at the request of Dav Palacios MD. for Synovial cyst of lumbar spine. She had an MRI L Spine 5.22.20 at [facility].      History of Present Illness  Today Ms. Wilder reports 1 year of right buttock and lateral hip and leg pain. She had several IM injections with minimal benefit. She did PT and had good relief during PT but had return of pain with ADLs but did slowly improve HEP. About 9 weeks ago, she began to have constant R>L leg pain that has multiple qualities- aching, burning, throbbing over the course of the day. She lies down with relief and she has been in bed up to 18 hours per day. It is associated with mood changes. She states that pain is predominantly on the right side from the waist down. She reports N/T but no significant weakness in BLE, R>L. She has trouble with sitting down on hard surfaces especially the toilet due to the excruciating pain. Today she reports her pain 7/10. She denies issues with her balance or gait. She denies issues with bowel or bladder incontinence. She reports some occasional neck pain which she initially thought was related to her migraines but believes its related to her back pain. No arm pain, tingling, or numbness.     The following portions of the patient's history were reviewed and updated as appropriate: allergies, current medications, past family history, past medical history, past social history, past surgical history and problem list.    Review of Systems   Constitutional: Positive for activity change and fatigue. Negative for chills and fever.   HENT: Negative for trouble swallowing.    Eyes: Negative for visual disturbance.   Cardiovascular: Negative for leg swelling.   Gastrointestinal: Positive for abdominal pain and nausea.   Genitourinary: Negative for difficulty urinating, frequency and urgency.   Musculoskeletal: Positive for back pain, neck  "pain and neck stiffness. Negative for gait problem.   Neurological: Positive for dizziness, weakness, light-headedness, numbness and headaches.   Hematological: Bruises/bleeds easily.   Psychiatric/Behavioral: Positive for sleep disturbance (unable to sleep due to pain). The patient is nervous/anxious (pain has changed her daily activity).        Objective     Vitals:    06/04/20 0815   BP: 135/90   Pulse: 82   Temp: 97.5 °F (36.4 °C)   Weight: 59.4 kg (131 lb)   Height: 162.6 cm (64\")     Body mass index is 22.49 kg/m².      Physical Exam   Constitutional: She appears well-developed and well-nourished.   Cardiovascular: Normal rate, regular rhythm and normal heart sounds.   Pulmonary/Chest: Effort normal and breath sounds normal.   Abdominal: Soft. Bowel sounds are normal. She exhibits no distension. There is no tenderness.   Musculoskeletal:        Lumbar back: She exhibits bony tenderness. She exhibits normal range of motion, no tenderness and no pain ( neg SLR).   Neurological: She is alert. She has normal strength. Gait normal.   Reflex Scores:       Tricep reflexes are 2+ on the right side.       Bicep reflexes are 3+ on the right side and 3+ on the left side.       Brachioradialis reflexes are 3+ on the right side and 3+ on the left side.       Patellar reflexes are 3+ on the right side and 2+ on the left side.       Achilles reflexes are 1+ on the right side and 1+ on the left side.  Skin: Skin is warm and dry.   Psychiatric: Her speech is normal. Judgment normal. Her mood appears anxious ( mildly). Cognition and memory are normal.   Vitals reviewed.    Neurologic Exam     Mental Status   Follows 3 step commands.   Attention: normal. Concentration: normal.   Speech: speech is normal   Level of consciousness: alert  Knowledge: good.   Normal comprehension.     Motor Exam   Muscle bulk: normal    Strength   Strength 5/5 throughout.     Sensory Exam   Light touch normal.     Gait, Coordination, and Reflexes "     Gait  Gait: normal    Reflexes   Right brachioradialis: 3+  Left brachioradialis: 3+  Right biceps: 3+  Left biceps: 3+  Right triceps: 2+  Right patellar: 3+  Left patellar: 2+  Right achilles: 1+  Left achilles: 1+  Right Mcmanus: absent  Left Mcmanus: absent  Right ankle clonus: absent  Left ankle clonus: absent  Able to heel and toe walk         Assessment/Plan   Independent Review of Radiographic Studies:      I personally reviewed the images from the following studies.    MRI lumbar spine reviewed by discussed with Dr. Huggins.  This reveals a large right-sided L4/5 synovial cyst with significant lateral recess stenosis.  There is also a smaller right-sided L3/4 synovial cyst as well as an S2 posterior Tarlov cyst.  She has facet arthropathy at L4/5.    Lumbar x-rays reveal normal alignment and no evidence of pars fracture.  Mild posterior bony changes in lower lumbar spine    Medical Decision Making:      Patient presents with 1 year of right leg pain significantly worse in the last 2+ months.  She has significantly withdrawn her activities and is quite saddened by her lack of activity.  She has taken very little pain medication as she is frightened of addiction although she has no addiction history.  She has tried conservative measures including physical therapy and IM steroid injections.  Recent MRI reveals the above findings with the most significant finding being a large right-sided L4/5 synovial cyst causing lateral recess and nerve compression.  On exam she has no weakness or sensory changes.  Negative straight leg raise.  She is generally hyperreflexive, but no upper extremity complaints, weakness, or imbalance noted on exam.  I discussed the films with Dr. Levin.  He concurs with radiology report.  He has spoken with the patient regarding minimally invasive laminectomy with removal of synovial cyst versus watchful waiting.  Epidural injections unlikely to give her benefit..  She is anxious to  proceed with surgery for resolution of pain.  I have offered her a prescription for Elavil to help with both her mood, sleep, and hopefully some with pain.  I did explain to her that it takes several days to help and may not be overly effective but she is willing to try.  Dr. Levin will add his addendum with regard to surgical discussion.  Patient will follow-up after surgery.      Allyson was seen today for back pain.    Diagnoses and all orders for this visit:    Chronic right-sided low back pain with right-sided sciatica  -     XR Spine Lumbar Complete With Flex & Ext; Future    Synovial cyst  -     XR Spine Lumbar Complete With Flex & Ext; Future    Other orders  -     amitriptyline (ELAVIL) 25 MG tablet; Take 1 tablet by mouth Every Night.      Return for Postop vist as scheduled.    This part is from Dr. Lind.  I did have a long discussion with the patient about the situation.  I explained that she does have a small synovial cyst at L3-4 and she may ultimately require a fusion in her lumbar spine but I think it is reasonable to consider surgery at L4-5.  I told the patient about the risks, complications and expected outcome of the lumbar surgery.  I explained that there was an 80% chance of getting rid of the pain in the leg.  I explained that there would still be back pain after the surgery.  Initially this will be quite severe but will improve over time.  There is a 2 or 3% chance of infection, bleeding, CSF leak, damage to the nerve as a result of surgery, paralysis, as well as anesthetic risk.  There is a 5% chance of late instability which could require a fusion later on and a 10% chance of recurrent disc herniation.  We discussed the postoperative hospital and home course.    She does asked to proceed and so she will need to be scheduled for a: Right lumbar 4 to lumbar 5 laminectomy with removal of synovial cyst using metrx

## 2020-06-04 ENCOUNTER — PREP FOR SURGERY (OUTPATIENT)
Dept: OTHER | Facility: HOSPITAL | Age: 60
End: 2020-06-04

## 2020-06-04 ENCOUNTER — OFFICE VISIT (OUTPATIENT)
Dept: NEUROSURGERY | Facility: CLINIC | Age: 60
End: 2020-06-04

## 2020-06-04 VITALS
SYSTOLIC BLOOD PRESSURE: 135 MMHG | DIASTOLIC BLOOD PRESSURE: 90 MMHG | BODY MASS INDEX: 22.36 KG/M2 | HEART RATE: 82 BPM | HEIGHT: 64 IN | WEIGHT: 131 LBS | TEMPERATURE: 97.5 F

## 2020-06-04 DIAGNOSIS — M71.38 SYNOVIAL CYST OF LUMBAR SPINE: Primary | ICD-10-CM

## 2020-06-04 DIAGNOSIS — M71.30 SYNOVIAL CYST: ICD-10-CM

## 2020-06-04 DIAGNOSIS — G89.29 CHRONIC RIGHT-SIDED LOW BACK PAIN WITH RIGHT-SIDED SCIATICA: Primary | ICD-10-CM

## 2020-06-04 DIAGNOSIS — M54.41 CHRONIC RIGHT-SIDED LOW BACK PAIN WITH RIGHT-SIDED SCIATICA: Primary | ICD-10-CM

## 2020-06-04 PROCEDURE — 99244 OFF/OP CNSLTJ NEW/EST MOD 40: CPT | Performed by: NURSE PRACTITIONER

## 2020-06-04 RX ORDER — CEFAZOLIN SODIUM 2 G/100ML
2 INJECTION, SOLUTION INTRAVENOUS ONCE
Status: CANCELLED | OUTPATIENT
Start: 2020-06-10 | End: 2020-06-04

## 2020-06-04 RX ORDER — AMITRIPTYLINE HYDROCHLORIDE 25 MG/1
25 TABLET, FILM COATED ORAL NIGHTLY
Qty: 30 TABLET | Refills: 0 | Status: SHIPPED | OUTPATIENT
Start: 2020-06-04 | End: 2020-06-08

## 2020-06-08 ENCOUNTER — APPOINTMENT (OUTPATIENT)
Dept: PREADMISSION TESTING | Facility: HOSPITAL | Age: 60
End: 2020-06-08

## 2020-06-08 VITALS
HEIGHT: 64 IN | SYSTOLIC BLOOD PRESSURE: 134 MMHG | WEIGHT: 131.9 LBS | BODY MASS INDEX: 22.52 KG/M2 | HEART RATE: 82 BPM | TEMPERATURE: 98.1 F | OXYGEN SATURATION: 98 % | DIASTOLIC BLOOD PRESSURE: 90 MMHG | RESPIRATION RATE: 16 BRPM

## 2020-06-08 LAB
ANION GAP SERPL CALCULATED.3IONS-SCNC: 11.1 MMOL/L (ref 5–15)
BUN BLD-MCNC: 14 MG/DL (ref 8–23)
BUN/CREAT SERPL: 18.4 (ref 7–25)
CALCIUM SPEC-SCNC: 9 MG/DL (ref 8.6–10.5)
CHLORIDE SERPL-SCNC: 101 MMOL/L (ref 98–107)
CO2 SERPL-SCNC: 23.9 MMOL/L (ref 22–29)
CREAT BLD-MCNC: 0.76 MG/DL (ref 0.57–1)
DEPRECATED RDW RBC AUTO: 44.2 FL (ref 37–54)
ERYTHROCYTE [DISTWIDTH] IN BLOOD BY AUTOMATED COUNT: 12.2 % (ref 12.3–15.4)
GFR SERPL CREATININE-BSD FRML MDRD: 78 ML/MIN/1.73
GLUCOSE BLD-MCNC: 97 MG/DL (ref 65–99)
HCT VFR BLD AUTO: 44.8 % (ref 34–46.6)
HGB BLD-MCNC: 14.7 G/DL (ref 12–15.9)
MCH RBC QN AUTO: 32.2 PG (ref 26.6–33)
MCHC RBC AUTO-ENTMCNC: 32.8 G/DL (ref 31.5–35.7)
MCV RBC AUTO: 98 FL (ref 79–97)
PLATELET # BLD AUTO: 289 10*3/MM3 (ref 140–450)
PMV BLD AUTO: 9 FL (ref 6–12)
POTASSIUM BLD-SCNC: 4 MMOL/L (ref 3.5–5.2)
RBC # BLD AUTO: 4.57 10*6/MM3 (ref 3.77–5.28)
SODIUM BLD-SCNC: 136 MMOL/L (ref 136–145)
WBC NRBC COR # BLD: 4.55 10*3/MM3 (ref 3.4–10.8)

## 2020-06-08 PROCEDURE — U0004 COV-19 TEST NON-CDC HGH THRU: HCPCS | Performed by: NURSE PRACTITIONER

## 2020-06-08 PROCEDURE — 93010 ELECTROCARDIOGRAM REPORT: CPT | Performed by: INTERNAL MEDICINE

## 2020-06-08 PROCEDURE — 80048 BASIC METABOLIC PNL TOTAL CA: CPT | Performed by: NEUROLOGICAL SURGERY

## 2020-06-08 PROCEDURE — C9803 HOPD COVID-19 SPEC COLLECT: HCPCS

## 2020-06-08 PROCEDURE — 85027 COMPLETE CBC AUTOMATED: CPT | Performed by: NEUROLOGICAL SURGERY

## 2020-06-08 PROCEDURE — 36415 COLL VENOUS BLD VENIPUNCTURE: CPT

## 2020-06-08 PROCEDURE — 93005 ELECTROCARDIOGRAM TRACING: CPT

## 2020-06-08 NOTE — DISCHARGE INSTRUCTIONS
PLEASE ARRIVE AT 10AM ON 6/10/2020      Take the following medications the morning of surgery:        General Instructions:  • Do not eat solid food after midnight the night before surgery.  • You may drink clear liquids day of surgery but must stop at least one hour before your hospital arrival time(9AM).  • It is beneficial for you to have a clear drink that contains carbohydrates the day of surgery.  We suggest a 12 to 20 ounce bottle of Gatorade or Powerade for non-diabetic patients or a 12 to 20 ounce bottle of G2 or Powerade Zero for diabetic patients. (Pediatric patients, are not advised to drink a 12 to 20 ounce carbohydrate drink)    Clear liquids are liquids you can see through.  Nothing red in color.     Plain water                               Sports drinks  Sodas                                   Gelatin (Jell-O)  Fruit juices without pulp such as white grape juice and apple juice  Popsicles that contain no fruit or yogurt  Tea or coffee (no cream or milk added)  Gatorade / Powerade  G2 / Powerade Zero    • Infants may have breast milk up to four hours before surgery.  • Infants drinking formula may drink formula up to six hours before surgery.   • Patients who avoid smoking, chewing tobacco and alcohol for 4 weeks prior to surgery have a reduced risk of post-operative complications.  Quit smoking as many days before surgery as you can.  • Do not smoke, use chewing tobacco or drink alcohol the day of surgery.   • If applicable bring your C-PAP/ BI-PAP machine.  • Bring any papers given to you in the doctor’s office.  • Wear clean comfortable clothes.  • Do not wear contact lenses, false eyelashes or make-up.  Bring a case for your glasses.   • Bring crutches or walker if applicable.  • Remove all piercings.  Leave jewelry and any other valuables at home.  • Hair extensions with metal clips must be removed prior to surgery.  • The Pre-Admission Testing nurse will instruct you to bring medications if  unable to obtain an accurate list in Pre-Admission Testing.            Preventing a Surgical Site Infection:  • For 2 to 3 days before surgery, avoid shaving with a razor because the razor can irritate skin and make it easier to develop an infection.    • Any areas of open skin can increase the risk of a post-operative wound infection by allowing bacteria to enter and travel throughout the body.  Notify your surgeon if you have any skin wounds / rashes even if it is not near the expected surgical site.  The area will need assessed to determine if surgery should be delayed until it is healed.  • The night prior to surgery shower using a fresh bar of anti-bacterial soap (such as Dial) and clean washcloth.  Sleep in a clean bed with clean clothing.  Do not allow pets to sleep with you.  • Shower on the morning of surgery using a fresh bar of anti-bacterial soap (such as Dial) and clean washcloth.  Dry with a clean towel and dress in clean clothing.  • Ask your surgeon if you will be receiving antibiotics prior to surgery.  • Make sure you, your family, and all healthcare providers clean their hands with soap and water or an alcohol based hand  before caring for you or your wound.    Day of surgery:  Your arrival time is approximately two hours before your scheduled surgery time.  Upon arrival, a Pre-op nurse and Anesthesiologist will review your health history, obtain vital signs, and answer questions you may have.  The only belongings needed at this time will be a list of your home medications and if applicable your C-PAP/BI-PAP machine.  If you are staying overnight your family can leave the rest of your belongings in the car and bring them to your room later.  A Pre-op nurse will start an IV and you may receive medication in preparation for surgery, including something to help you relax.  Your family will be able to see you in the Pre-op area.  Two visitors at a time will be allowed in the Pre-op room.   While you are in surgery your family should notify the waiting room  if they leave the waiting room area and provide a contact phone number.    Please be aware that surgery does come with discomfort.  We want to make every effort to control your discomfort so please discuss any uncontrolled symptoms with your nurse.   Your doctor will most likely have prescribed pain medications.      If you are going home after surgery you will receive individualized written care instructions before being discharged.  A responsible adult must drive you to and from the hospital on the day of your surgery and stay with you for 24 hours.    If you are staying overnight following surgery, you will be transported to your hospital room following the recovery period.  [facility] has all private rooms.    If you have any questions please call Pre-Admission Testing at [phone].  Deductibles and co-payments are collected on the day of service. Please be prepared to pay the required co-pay, deductible or deposit on the day of service as defined by your plan.    Patient Education for Self-Quarantine Process    Following your COVID testing, we strongly recommend that you do not leave your home after you have been tested for COVID except to get medical care. This includes not going to work, school or to public areas.  If this is not possible for you to do please limit your activities to only required outings.  Be sure to wear a mask when you are with other people, practice social distancing and wash your hands frequently.      The following items provide additional details to keep you safe.  • Wash your hands with soap and water frequently for at least 20 seconds.   • Avoid touching your eyes, nose and mouth with unwashed hands.  • Do not share anything - utensils, towels, food from the same bowl.   • Have your own utensils, drinking glass, dishes, towels and bedding.   • Do not have visitors.   • Do use FaceTime  to stay in touch with family and friends.  • You should stay in a specific room away from others if possible.   • Stay at least 6 feet away from others in the home if you cannot have a dedicated room to yourself.   • Do not snuggle with your pet. While the CDC says there is no evidence that pets can spread COVID-19 or be infected from humans, it is probably best to avoid “petting, snuggling, being kissed or licked and sharing food (during self-quarantine)”, according to the CDC.   • Sanitize household surfaces daily. Include all high touch areas (door handles, light switches, phones, countertops, etc.)  • Do not share a bathroom with others, if possible.   • Wear a mask around others in your home if you are unable to stay in a separate room or 6 feet apart. If  you are unable to wear a mask, have your family member wear a mask if they must be within 6 feet of you.   Call your surgeon immediately if you experience any of the following symptoms:  • Sore Throat  • Shortness of Breath or difficulty breathing  • Cough  • Chills  • Body soreness or muscle pain  • Headache  • Fever  • New loss of taste or smell  • Do not arrive for your surgery ill.  Your procedure will need to be rescheduled to another time.  You will need to call your physician before the day of surgery to avoid any unnecessary exposure to hospital staff as well as other patients.    CHLORHEXIDINE CLOTH INSTRUCTIONS  The morning of surgery follow these instructions using the Chlorhexidine cloths you've been given.  These steps reduce bacteria on the body.  Do not use the cloths near your eyes, ears mouth, genitalia or on open wounds.  Throw the cloths away after use but do not try to flush them down a toilet.      • Open and remove one cloth at a time from the package.    • Leave the cloth unfolded and begin the bathing.  • Massage the skin with the cloths using gentle pressure to remove bacteria.  Do not scrub harshly.   • Follow the steps below with  one 2% CHG cloth per area (6 total cloths).  • One cloth for neck, shoulders and chest.  • One cloth for both arms, hands, fingers and underarms (do underarms last).  • One cloth for the abdomen followed by groin.  • One cloth for right leg and foot including between the toes.  • One cloth for left leg and foot including between the toes.  • The last cloth is to be used for the back of the neck, back and buttocks.    Allow the CHG to air dry 3 minutes on the skin which will give it time to work and decrease the chance of irritation.  The skin may feel sticky until it is dry.  Do not rinse with water or any other liquid or you will lose the beneficial effects of the CHG.  If mild skin irritation occurs, do rinse the skin to remove the CHG.  Report this to the nurse at time of admission.  Do not apply lotions, creams, ointments, deodorants or perfumes after using the clothes. Dress in clean clothes before coming to the hospital.

## 2020-06-09 LAB
REF LAB TEST METHOD: NORMAL
SARS-COV-2 RNA RESP QL NAA+PROBE: NOT DETECTED

## 2020-06-10 ENCOUNTER — ANESTHESIA (OUTPATIENT)
Dept: PERIOP | Facility: HOSPITAL | Age: 60
End: 2020-06-10

## 2020-06-10 ENCOUNTER — APPOINTMENT (OUTPATIENT)
Dept: GENERAL RADIOLOGY | Facility: HOSPITAL | Age: 60
End: 2020-06-10

## 2020-06-10 ENCOUNTER — ANESTHESIA EVENT (OUTPATIENT)
Dept: PERIOP | Facility: HOSPITAL | Age: 60
End: 2020-06-10

## 2020-06-10 ENCOUNTER — HOSPITAL ENCOUNTER (OUTPATIENT)
Facility: HOSPITAL | Age: 60
Setting detail: HOSPITAL OUTPATIENT SURGERY
Discharge: HOME | End: 2020-06-10
Attending: NEUROLOGICAL SURGERY | Admitting: NEUROLOGICAL SURGERY

## 2020-06-10 VITALS
DIASTOLIC BLOOD PRESSURE: 78 MMHG | SYSTOLIC BLOOD PRESSURE: 124 MMHG | HEART RATE: 88 BPM | TEMPERATURE: 98.3 F | BODY MASS INDEX: 22.23 KG/M2 | HEIGHT: 64 IN | WEIGHT: 130.2 LBS | RESPIRATION RATE: 16 BRPM | OXYGEN SATURATION: 96 %

## 2020-06-10 DIAGNOSIS — M71.38 SYNOVIAL CYST OF LUMBAR SPINE: ICD-10-CM

## 2020-06-10 PROCEDURE — 25010000002 FENTANYL CITRATE (PF) 100 MCG/2ML SOLUTION: Performed by: NURSE ANESTHETIST, CERTIFIED REGISTERED

## 2020-06-10 PROCEDURE — 63267 EXCISE INTRSPINL LESION LMBR: CPT | Performed by: SPECIALIST/TECHNOLOGIST, OTHER

## 2020-06-10 PROCEDURE — 69990 MICROSURGERY ADD-ON: CPT | Performed by: SPECIALIST/TECHNOLOGIST, OTHER

## 2020-06-10 PROCEDURE — 88304 TISSUE EXAM BY PATHOLOGIST: CPT | Performed by: NEUROLOGICAL SURGERY

## 2020-06-10 PROCEDURE — 25010000002 PROPOFOL 10 MG/ML EMULSION: Performed by: NURSE ANESTHETIST, CERTIFIED REGISTERED

## 2020-06-10 PROCEDURE — 25010000002 NEOSTIGMINE PER 0.5 MG: Performed by: NURSE ANESTHETIST, CERTIFIED REGISTERED

## 2020-06-10 PROCEDURE — 69990 MICROSURGERY ADD-ON: CPT | Performed by: NEUROLOGICAL SURGERY

## 2020-06-10 PROCEDURE — 25010000002 DEXAMETHASONE PER 1 MG: Performed by: NURSE ANESTHETIST, CERTIFIED REGISTERED

## 2020-06-10 PROCEDURE — 72100 X-RAY EXAM L-S SPINE 2/3 VWS: CPT

## 2020-06-10 PROCEDURE — 25010000003 CEFAZOLIN IN DEXTROSE 2-4 GM/100ML-% SOLUTION: Performed by: NEUROLOGICAL SURGERY

## 2020-06-10 PROCEDURE — 25010000002 MIDAZOLAM PER 1 MG: Performed by: ANESTHESIOLOGY

## 2020-06-10 PROCEDURE — 76000 FLUOROSCOPY <1 HR PHYS/QHP: CPT

## 2020-06-10 PROCEDURE — 25010000002 VANCOMYCIN 1 G RECONSTITUTED SOLUTION 1 EACH VIAL: Performed by: NEUROLOGICAL SURGERY

## 2020-06-10 PROCEDURE — 25010000002 ONDANSETRON PER 1 MG: Performed by: NURSE ANESTHETIST, CERTIFIED REGISTERED

## 2020-06-10 PROCEDURE — 25010000002 HYDROMORPHONE PER 4 MG: Performed by: NURSE ANESTHETIST, CERTIFIED REGISTERED

## 2020-06-10 PROCEDURE — 63267 EXCISE INTRSPINL LESION LMBR: CPT | Performed by: NEUROLOGICAL SURGERY

## 2020-06-10 PROCEDURE — 25010000002 KETOROLAC TROMETHAMINE PER 15 MG: Performed by: NURSE ANESTHETIST, CERTIFIED REGISTERED

## 2020-06-10 DEVICE — SSC BONE WAX
Type: IMPLANTABLE DEVICE | Site: SPINE LUMBAR | Status: FUNCTIONAL
Brand: SSC BONE WAX

## 2020-06-10 DEVICE — FLOSEAL HEMOSTATIC MATRIX, 5ML
Type: IMPLANTABLE DEVICE | Site: SPINE LUMBAR | Status: FUNCTIONAL
Brand: FLOSEAL HEMOSTATIC MATRIX

## 2020-06-10 RX ORDER — LIDOCAINE HYDROCHLORIDE 20 MG/ML
INJECTION, SOLUTION INFILTRATION; PERINEURAL AS NEEDED
Status: DISCONTINUED | OUTPATIENT
Start: 2020-06-10 | End: 2020-06-10 | Stop reason: SURG

## 2020-06-10 RX ORDER — PROMETHAZINE HYDROCHLORIDE 25 MG/ML
6.25 INJECTION, SOLUTION INTRAMUSCULAR; INTRAVENOUS
Status: DISCONTINUED | OUTPATIENT
Start: 2020-06-10 | End: 2020-06-10 | Stop reason: HOSPADM

## 2020-06-10 RX ORDER — OXYCODONE AND ACETAMINOPHEN 7.5; 325 MG/1; MG/1
1 TABLET ORAL ONCE AS NEEDED
Status: DISCONTINUED | OUTPATIENT
Start: 2020-06-10 | End: 2020-06-10 | Stop reason: HOSPADM

## 2020-06-10 RX ORDER — SODIUM CHLORIDE 0.9 % (FLUSH) 0.9 %
3-10 SYRINGE (ML) INJECTION AS NEEDED
Status: DISCONTINUED | OUTPATIENT
Start: 2020-06-10 | End: 2020-06-10 | Stop reason: HOSPADM

## 2020-06-10 RX ORDER — LIDOCAINE HYDROCHLORIDE 10 MG/ML
0.5 INJECTION, SOLUTION EPIDURAL; INFILTRATION; INTRACAUDAL; PERINEURAL ONCE AS NEEDED
Status: DISCONTINUED | OUTPATIENT
Start: 2020-06-10 | End: 2020-06-10 | Stop reason: HOSPADM

## 2020-06-10 RX ORDER — HYDROCODONE BITARTRATE AND ACETAMINOPHEN 5; 325 MG/1; MG/1
1 TABLET ORAL EVERY 4 HOURS PRN
Qty: 35 TABLET | Refills: 0 | Status: SHIPPED | OUTPATIENT
Start: 2020-06-10 | End: 2020-06-25

## 2020-06-10 RX ORDER — EPHEDRINE SULFATE 50 MG/ML
5 INJECTION, SOLUTION INTRAVENOUS ONCE AS NEEDED
Status: DISCONTINUED | OUTPATIENT
Start: 2020-06-10 | End: 2020-06-10 | Stop reason: HOSPADM

## 2020-06-10 RX ORDER — FENTANYL CITRATE 50 UG/ML
INJECTION, SOLUTION INTRAMUSCULAR; INTRAVENOUS AS NEEDED
Status: DISCONTINUED | OUTPATIENT
Start: 2020-06-10 | End: 2020-06-10 | Stop reason: SURG

## 2020-06-10 RX ORDER — PROMETHAZINE HYDROCHLORIDE 25 MG/1
25 TABLET ORAL ONCE AS NEEDED
Status: DISCONTINUED | OUTPATIENT
Start: 2020-06-10 | End: 2020-06-10 | Stop reason: HOSPADM

## 2020-06-10 RX ORDER — PROMETHAZINE HYDROCHLORIDE 25 MG/ML
12.5 INJECTION, SOLUTION INTRAMUSCULAR; INTRAVENOUS ONCE AS NEEDED
Status: DISCONTINUED | OUTPATIENT
Start: 2020-06-10 | End: 2020-06-10 | Stop reason: HOSPADM

## 2020-06-10 RX ORDER — HYDROMORPHONE HYDROCHLORIDE 1 MG/ML
0.5 INJECTION, SOLUTION INTRAMUSCULAR; INTRAVENOUS; SUBCUTANEOUS
Status: DISCONTINUED | OUTPATIENT
Start: 2020-06-10 | End: 2020-06-10 | Stop reason: HOSPADM

## 2020-06-10 RX ORDER — NALOXONE HCL 0.4 MG/ML
0.2 VIAL (ML) INJECTION AS NEEDED
Status: DISCONTINUED | OUTPATIENT
Start: 2020-06-10 | End: 2020-06-10 | Stop reason: HOSPADM

## 2020-06-10 RX ORDER — DIPHENHYDRAMINE HYDROCHLORIDE 50 MG/ML
12.5 INJECTION INTRAMUSCULAR; INTRAVENOUS
Status: DISCONTINUED | OUTPATIENT
Start: 2020-06-10 | End: 2020-06-10 | Stop reason: HOSPADM

## 2020-06-10 RX ORDER — CEPHALEXIN 500 MG/1
500 CAPSULE ORAL 4 TIMES DAILY
Qty: 20 CAPSULE | Refills: 0 | Status: SHIPPED | OUTPATIENT
Start: 2020-06-10 | End: 2020-06-15

## 2020-06-10 RX ORDER — DIPHENHYDRAMINE HCL 25 MG
25 CAPSULE ORAL
Status: DISCONTINUED | OUTPATIENT
Start: 2020-06-10 | End: 2020-06-10 | Stop reason: HOSPADM

## 2020-06-10 RX ORDER — FENTANYL CITRATE 50 UG/ML
50 INJECTION, SOLUTION INTRAMUSCULAR; INTRAVENOUS
Status: DISCONTINUED | OUTPATIENT
Start: 2020-06-10 | End: 2020-06-10 | Stop reason: HOSPADM

## 2020-06-10 RX ORDER — HYDROCODONE BITARTRATE AND ACETAMINOPHEN 7.5; 325 MG/1; MG/1
1 TABLET ORAL ONCE AS NEEDED
Status: COMPLETED | OUTPATIENT
Start: 2020-06-10 | End: 2020-06-10

## 2020-06-10 RX ORDER — ONDANSETRON 2 MG/ML
INJECTION INTRAMUSCULAR; INTRAVENOUS AS NEEDED
Status: DISCONTINUED | OUTPATIENT
Start: 2020-06-10 | End: 2020-06-10 | Stop reason: SURG

## 2020-06-10 RX ORDER — PROMETHAZINE HYDROCHLORIDE 25 MG/1
25 SUPPOSITORY RECTAL ONCE AS NEEDED
Status: DISCONTINUED | OUTPATIENT
Start: 2020-06-10 | End: 2020-06-10 | Stop reason: HOSPADM

## 2020-06-10 RX ORDER — HYDRALAZINE HYDROCHLORIDE 20 MG/ML
5 INJECTION INTRAMUSCULAR; INTRAVENOUS
Status: DISCONTINUED | OUTPATIENT
Start: 2020-06-10 | End: 2020-06-10 | Stop reason: HOSPADM

## 2020-06-10 RX ORDER — DEXAMETHASONE SODIUM PHOSPHATE 10 MG/ML
INJECTION INTRAMUSCULAR; INTRAVENOUS AS NEEDED
Status: DISCONTINUED | OUTPATIENT
Start: 2020-06-10 | End: 2020-06-10 | Stop reason: SURG

## 2020-06-10 RX ORDER — ONDANSETRON 2 MG/ML
4 INJECTION INTRAMUSCULAR; INTRAVENOUS ONCE AS NEEDED
Status: DISCONTINUED | OUTPATIENT
Start: 2020-06-10 | End: 2020-06-10 | Stop reason: HOSPADM

## 2020-06-10 RX ORDER — KETOROLAC TROMETHAMINE 30 MG/ML
INJECTION, SOLUTION INTRAMUSCULAR; INTRAVENOUS AS NEEDED
Status: DISCONTINUED | OUTPATIENT
Start: 2020-06-10 | End: 2020-06-10 | Stop reason: SURG

## 2020-06-10 RX ORDER — ROCURONIUM BROMIDE 10 MG/ML
INJECTION, SOLUTION INTRAVENOUS AS NEEDED
Status: DISCONTINUED | OUTPATIENT
Start: 2020-06-10 | End: 2020-06-10 | Stop reason: SURG

## 2020-06-10 RX ORDER — MIDAZOLAM HYDROCHLORIDE 1 MG/ML
1 INJECTION INTRAMUSCULAR; INTRAVENOUS
Status: DISCONTINUED | OUTPATIENT
Start: 2020-06-10 | End: 2020-06-10 | Stop reason: HOSPADM

## 2020-06-10 RX ORDER — PROPOFOL 10 MG/ML
VIAL (ML) INTRAVENOUS AS NEEDED
Status: DISCONTINUED | OUTPATIENT
Start: 2020-06-10 | End: 2020-06-10 | Stop reason: SURG

## 2020-06-10 RX ORDER — SODIUM CHLORIDE, SODIUM LACTATE, POTASSIUM CHLORIDE, CALCIUM CHLORIDE 600; 310; 30; 20 MG/100ML; MG/100ML; MG/100ML; MG/100ML
9 INJECTION, SOLUTION INTRAVENOUS CONTINUOUS
Status: DISCONTINUED | OUTPATIENT
Start: 2020-06-10 | End: 2020-06-10 | Stop reason: HOSPADM

## 2020-06-10 RX ORDER — ACETAMINOPHEN 325 MG/1
650 TABLET ORAL ONCE AS NEEDED
Status: DISCONTINUED | OUTPATIENT
Start: 2020-06-10 | End: 2020-06-10 | Stop reason: HOSPADM

## 2020-06-10 RX ORDER — SODIUM CHLORIDE 0.9 % (FLUSH) 0.9 %
3 SYRINGE (ML) INJECTION EVERY 12 HOURS SCHEDULED
Status: DISCONTINUED | OUTPATIENT
Start: 2020-06-10 | End: 2020-06-10 | Stop reason: HOSPADM

## 2020-06-10 RX ORDER — FAMOTIDINE 10 MG/ML
20 INJECTION, SOLUTION INTRAVENOUS ONCE
Status: COMPLETED | OUTPATIENT
Start: 2020-06-10 | End: 2020-06-10

## 2020-06-10 RX ORDER — CEFAZOLIN SODIUM 2 G/100ML
2 INJECTION, SOLUTION INTRAVENOUS ONCE
Status: COMPLETED | OUTPATIENT
Start: 2020-06-10 | End: 2020-06-10

## 2020-06-10 RX ORDER — GLYCOPYRROLATE 0.2 MG/ML
INJECTION INTRAMUSCULAR; INTRAVENOUS AS NEEDED
Status: DISCONTINUED | OUTPATIENT
Start: 2020-06-10 | End: 2020-06-10 | Stop reason: SURG

## 2020-06-10 RX ORDER — MIDAZOLAM HYDROCHLORIDE 1 MG/ML
2 INJECTION INTRAMUSCULAR; INTRAVENOUS
Status: DISCONTINUED | OUTPATIENT
Start: 2020-06-10 | End: 2020-06-10 | Stop reason: HOSPADM

## 2020-06-10 RX ORDER — FLUMAZENIL 0.1 MG/ML
0.2 INJECTION INTRAVENOUS AS NEEDED
Status: DISCONTINUED | OUTPATIENT
Start: 2020-06-10 | End: 2020-06-10 | Stop reason: HOSPADM

## 2020-06-10 RX ADMIN — FENTANYL CITRATE 50 MCG: 50 INJECTION, SOLUTION INTRAMUSCULAR; INTRAVENOUS at 11:10

## 2020-06-10 RX ADMIN — SODIUM CHLORIDE, POTASSIUM CHLORIDE, SODIUM LACTATE AND CALCIUM CHLORIDE 9 ML/HR: 600; 310; 30; 20 INJECTION, SOLUTION INTRAVENOUS at 08:40

## 2020-06-10 RX ADMIN — FAMOTIDINE 20 MG: 10 INJECTION, SOLUTION INTRAVENOUS at 09:09

## 2020-06-10 RX ADMIN — LIDOCAINE HYDROCHLORIDE 100 MG: 20 INJECTION, SOLUTION INFILTRATION; PERINEURAL at 09:53

## 2020-06-10 RX ADMIN — FENTANYL CITRATE 50 MCG: 50 INJECTION, SOLUTION INTRAMUSCULAR; INTRAVENOUS at 11:26

## 2020-06-10 RX ADMIN — FENTANYL CITRATE 50 MCG: 50 INJECTION INTRAMUSCULAR; INTRAVENOUS at 11:00

## 2020-06-10 RX ADMIN — KETOROLAC TROMETHAMINE 30 MG: 30 INJECTION, SOLUTION INTRAMUSCULAR; INTRAVENOUS at 10:44

## 2020-06-10 RX ADMIN — HYDROMORPHONE HYDROCHLORIDE 0.5 MG: 1 INJECTION, SOLUTION INTRAMUSCULAR; INTRAVENOUS; SUBCUTANEOUS at 11:37

## 2020-06-10 RX ADMIN — ONDANSETRON HYDROCHLORIDE 4 MG: 2 SOLUTION INTRAMUSCULAR; INTRAVENOUS at 10:43

## 2020-06-10 RX ADMIN — ROCURONIUM BROMIDE 40 MG: 10 INJECTION, SOLUTION INTRAVENOUS at 09:55

## 2020-06-10 RX ADMIN — HYDROMORPHONE HYDROCHLORIDE 0.5 MG: 1 INJECTION, SOLUTION INTRAMUSCULAR; INTRAVENOUS; SUBCUTANEOUS at 11:16

## 2020-06-10 RX ADMIN — FENTANYL CITRATE 50 MCG: 50 INJECTION INTRAMUSCULAR; INTRAVENOUS at 11:02

## 2020-06-10 RX ADMIN — NEOSTIGMINE METHYLSULFATE 2 MG: 1 INJECTION INTRAMUSCULAR; INTRAVENOUS; SUBCUTANEOUS at 10:47

## 2020-06-10 RX ADMIN — PROPOFOL 120 MG: 10 INJECTION, EMULSION INTRAVENOUS at 09:55

## 2020-06-10 RX ADMIN — GLYCOPYRROLATE 0.4 MG: 0.2 INJECTION INTRAMUSCULAR; INTRAVENOUS at 10:47

## 2020-06-10 RX ADMIN — DEXAMETHASONE SODIUM PHOSPHATE 8 MG: 10 INJECTION INTRAMUSCULAR; INTRAVENOUS at 10:06

## 2020-06-10 RX ADMIN — CEFAZOLIN SODIUM 2 G: 2 INJECTION, SOLUTION INTRAVENOUS at 10:01

## 2020-06-10 RX ADMIN — MIDAZOLAM 1 MG: 1 INJECTION INTRAMUSCULAR; INTRAVENOUS at 09:09

## 2020-06-10 RX ADMIN — HYDROCODONE BITARTRATE AND ACETAMINOPHEN 1 TABLET: 7.5; 325 TABLET ORAL at 11:41

## 2020-06-10 RX ADMIN — FENTANYL CITRATE 100 MCG: 50 INJECTION INTRAMUSCULAR; INTRAVENOUS at 09:53

## 2020-06-10 NOTE — OP NOTE
Preop diagnosis: Lumbar radiculopathy secondary to synovial cyst formation right side L4-5    Postop diagnosis: same    Procedure performed: Right L4-5 laminectomy, medial facetectomy and removal of synovial cyst adherent to the dura using metrx and microsurgical technique microsurgical instrumentation    Surgeon: Christopher Levin M.D.    Assistant: Yessi Sharma CFA who was instrumental in helping with visualization of neural structures, hemostasis, and retraction of neural structures.    Indications for the procedure:  This is a patient with severe pain in the legs.  Previous imaging had shown neural compression at the L4-5 levels.  As a result of this and the failure of conservative therapy the patient elected to proceed with surgery.    Operative summary:  After induction of general anesthesia the patient was intubated and placed on the operating table in the prone position on a Arthur table.  All pressure points were padded including peripheral points of entrapment.  The back was prepped with Chloraprep and then draped with Ioban, half sheets, and a split sheet.      The L4-5 level was localized with intraoperative fluoroscopy an incision was made on the right just above the pedicle.  Successive dilating tubes up to 18 mm by 6 cm were placed over that area.  Soft tissue was then removed from the supralaminar space.  The inferior laminar arch of L4 as well as the superior laminar arch of L5 and the medial aspect of facet were removed with the Hive guard unlimited drill.  The remainder of the operation was done under high-power magnification of the operating microscope using microsurgical technique and microsurgical instrumentation.  The ligamentum flavum was opened and removed out to the level of the pedicle using the Kerrison rongeurs.  This exposed the lateral thecal sac and the nerve root of L5.  Once the lateral recess was opened the dissection was carried up to the 4 pedicle completely decompressing the superior nerve  root.    Once this was done the 5 nerve root was mobilized medially in order to dissect the synovial cyst off of the lateral dural sac and the nerve root.  We are able to do this without traumatizing the thecal sac or the nerve root.  Once and a synovial cyst was completely free it was removed in a piecemeal fashion using the Kerrison rongeurs.  Portions of the cyst were sent for pathological specimen.  Once the cyst was completely gone the bleeding was controlled with bipolar cautery.    Once the entire decompression was completed we were able to explore under the nerve root and the thecal sac using the Amezquita ball probe to be sure there was no evidence of residual compression.there being none bleeding was controlled again using the bipolar cautery, FloSeal, and thrombin and Gelfoam.  The area was then copiously irrigated with bacitracin solution and the tube was removed. The paraspinous musculature was injected with 100 cc 1/8% Marcaine with 1:200,000 epinephrine solution.    Another gram of Kefzol was given prior to closure.    The incision was then closed in layersdressed and the patient was taken to the recovery room in stable condition there were no apparent complications.  Sponge, instrument, and needle counts were correct at the end of the procedure.

## 2020-06-10 NOTE — BRIEF OP NOTE
LUMBAR DISCECTOMY POSTERIOR WITH METRIX  Progress Note    Allyson Wilder  6/10/2020    Pre-op Diagnosis:   Synovial cyst of lumbar spine [M71.38]       Post-Op Diagnosis Codes:     * Synovial cyst of lumbar spine [M71.38]    Procedure/CPT® Codes:      Procedure(s):  Right lumbar 4 to lumbar 5 laminectomy with removal of synovial cyst using metrx    Surgeon(s):  Christopher Levin MD    Anesthesia: General    Staff:   Circulator: Sarita Sidhu RN  Scrub Person: Sean Diehl  Assistant: Yessi Sharma CSA    Estimated Blood Loss: minimal    Urine Voided: * No values recorded between 6/10/2020  9:49 AM and 6/10/2020 10:52 AM *    Specimens:                Specimens     ID Source Type Tests Collected By Collected At Frozen?      A Back, Lower Tissue · TISSUE PATHOLOGY EXAM   Christopher Levin MD 6/10/20 1035 No     Description: Synovial Cyst     This specimen was not marked as sent.                Drains: * No LDAs found *    Findings: large synovial cyst adherent to dura    Complications: none      Christopher Levin MD     Date: 6/10/2020  Time: 10:54

## 2020-06-10 NOTE — DISCHARGE INSTRUCTIONS
LUMBAR SURGERY - KIM RODGERS M.D.  [address], [address]  [place] [address]  [phone]    Instructions & Care After Your Lumbar Surgery    1. No sitting except on the commode.  You may lie on a firm couch but not on a waterbed or recliner.  You may lie in any position that is comfortable, using only one pillow under your head. Either stand at a counter or lie on your side for meals. Three weeks after surgery you may begin sitting for 30 minutes 3 times per day.    2. No driving for three weeks.  You may ride in the car in a passenger seat that reclines or lying down in the back seat.      3. No bending. If you drop something allow someone else to pick it up.    4. Don’t lift anything heavier than a coffee cup or paperback book.    5. Gradually increase your activity each day.  You should get out of bed every hour during the day.  Walk outside as soon as you feel up to it.  Walk short distances frequently rather than making a long trip.  Frequency is more important than distance.  Your goal is to be walking 2 to 3 miles per day when you return for your post-operative visit. (Never do this in one trip.)    6. You may climb stairs.    7. Remove your bandage the second day after surgery and leave it off.  If you notice any redness, swelling or drainage, call the office.  There are steri-strips across the incision.  If these are still present ten days after surgery, remove them gently.      8. You may shower five days after surgery.  Keep the incision dry until then.  Don’t let the water beat directly on the incision and gently pat it dry.    9. Physical Therapy will be arranged at your post-operative visit if needed.    10. Your prescription for pain medication may be filled for half the original amount prior to your return office visit.  Due to changes in Federal Law in order to have this medication refilled you must contact the office four days prior to the date and make arrangements to pick the  prescription up in the office.  This prescription refill cannot be called in to the pharmacy. Your prescription will be ready for pick-up the day the refill is due.    Don’t be alarmed if you experience some of your pre-operative symptoms after going home.  This is not uncommon and normally goes away in a few days but may last longer.  If you have any questions or concerns, please call our office.

## 2020-06-10 NOTE — ANESTHESIA POSTPROCEDURE EVALUATION
Patient: Allyson Wilder    Procedure Summary     Date:  06/10/20 Room / Location:  [facility] OR [address] [place] [address] MAIN OR    Anesthesia Start:  0948 Anesthesia Stop:  1106    Procedure:  Right lumbar 4 to lumbar 5 laminectomy with removal of synovial cyst using metrx (Right Spine Lumbar) Diagnosis:       Synovial cyst of lumbar spine      (Synovial cyst of lumbar spine [M71.38])    Surgeon:  Christopher Levin MD Provider:  Yevgeniy Webb MD    Anesthesia Type:  general ASA Status:  2          Anesthesia Type: general    Vitals  Vitals Value Taken Time   /74 6/10/2020 12:00 PM   Temp 36.8 °C (98.3 °F) 6/10/2020 12:00 PM   Pulse 79 6/10/2020 12:05 PM   Resp 16 6/10/2020 12:00 PM   SpO2 100 % 6/10/2020 12:10 PM   Vitals shown include unvalidated device data.        Post Anesthesia Care and Evaluation      Comments: Pt. Discharged prior to being evaluated by anesthesia.  Chart is reviewed and no complications are noted.  THIS CASE IS NOT MEDICALLY DIRECTED

## 2020-06-10 NOTE — ANESTHESIA PROCEDURE NOTES
Airway  Urgency: elective    Date/Time: 6/10/2020 9:59 AM  Airway not difficult    General Information and Staff    Patient location during procedure: OR  Anesthesiologist: Yevgeniy Webb MD  CRNA: Myles Aquino CRNA    Indications and Patient Condition  Indications for airway management: airway protection    Preoxygenated: yes  Mask difficulty assessment: 1 - vent by mask    Final Airway Details  Final airway type: endotracheal airway      Successful airway: ETT  Cuffed: yes   Successful intubation technique: direct laryngoscopy  Endotracheal tube insertion site: oral  Blade: Kelly  Blade size: 2  ETT size (mm): 7.0  Cormack-Lehane Classification: grade I - full view of glottis  Placement verified by: chest auscultation and capnometry   Inital cuff pressure (cm H2O): 22  Measured from: lips  Number of attempts at approach: 1  Assessment: lips, teeth, and gum same as pre-op and atraumatic intubation    Additional Comments  Pre 02 100%, SIVI, DL x1, atraumatic intubation, BLBS, Positive ETC02.

## 2020-06-10 NOTE — ANESTHESIA PREPROCEDURE EVALUATION
Anesthesia Evaluation     Patient summary reviewed and Nursing notes reviewed   no history of anesthetic complications:  NPO Solid Status: > 8 hours  NPO Liquid Status: > 2 hours           Airway   Mallampati: II  TM distance: >3 FB  Neck ROM: full  no difficulty expected  Dental - normal exam     Pulmonary - normal exam   (+) a smoker Former,   (-) COPD, asthma, lung cancer  Cardiovascular - normal exam  Exercise tolerance: excellent (>7 METS)    ECG reviewed  Rhythm: regular  Rate: normal    (+) valvular problems/murmurs murmur,   (-) past MI, CAD, dysrhythmias, angina, CHF, orthopnea, cardiac stents, CABG, pericardial effusion      Neuro/Psych  (+) headaches, numbness,     (-) seizures, TIA, CVA  GI/Hepatic/Renal/Endo - negative ROS   (-) hiatal hernia, GERD, PUD, hepatitis, liver disease, no renal disease, diabetes, GI bleed, no thyroid disorder    Musculoskeletal     (+) back pain,   Abdominal  - normal exam   Substance History - negative use     OB/GYN negative ob/gyn ROS         Other   arthritis,          Phys Exam Other: TMJ w/ limited mouth opening per pt report                Anesthesia Plan    ASA 2     general     intravenous induction     Anesthetic plan, all risks, benefits, and alternatives have been provided, discussed and informed consent has been obtained with: patient.    Plan discussed with CRNA and attending.

## 2020-06-11 LAB
CYTO UR: NORMAL
LAB AP CASE REPORT: NORMAL
PATH REPORT.FINAL DX SPEC: NORMAL
PATH REPORT.GROSS SPEC: NORMAL

## 2020-06-15 RX ORDER — METHYLPREDNISOLONE ACETATE 80 MG/ML
80 INJECTION, SUSPENSION INTRA-ARTICULAR; INTRALESIONAL; INTRAMUSCULAR; SOFT TISSUE ONCE
Status: DISCONTINUED | OUTPATIENT
Start: 2020-06-15 | End: 2020-06-25

## 2020-06-24 ENCOUNTER — TELEPHONE (OUTPATIENT)
Dept: NEUROSURGERY | Facility: CLINIC | Age: 60
End: 2020-06-24

## 2020-06-24 NOTE — TELEPHONE ENCOUNTER
Left message for patient regarding pre-screening for the Corona virus. Pt is to call the office back

## 2020-06-24 NOTE — PROGRESS NOTES
" HPI:   Allyson Wilder is a 59 y.o. female for follow-up of low back pain. She is s/p Right L4/L5 laminectomy with removal of synovial cyst using metrx on Kate 10, 2020 by Dr. Levin. Pathology revealed fibrous tissue with cyst formation and myxoid degeneration consistent with synovial cyst.  No evidence of neoplasm.. She was discharged to home. She has not had postoperative complications.    She presents unaccompanied. She denies back pain but reports some tightness and soreness in her mid back. She denies N/T or weakness in BLE. She denies issues with balance/gait, bowel or bladder incontinence. No fever or wound issues.  She is no longer on any pain medication.  She feels that her depression is completely resolved.    Review of Systems   Constitutional: Negative for chills and fever.   Cardiovascular: Negative for leg swelling.   Gastrointestinal: Negative for constipation.   Genitourinary: Negative for difficulty urinating and enuresis.   Musculoskeletal: Negative for back pain and gait problem.   Skin: Negative for wound (wound redness, swelling, or drainage).   Neurological: Negative for weakness, numbness and headaches.   Psychiatric/Behavioral: Negative for sleep disturbance.        /89   Pulse 91   Temp 97.8 °F (36.6 °C)   Ht 162.6 cm (64\")   Wt 59 kg (130 lb)   BMI 22.31 kg/m²     Physical Exam   Constitutional: She appears well-developed and well-nourished.   Pulmonary/Chest: Effort normal.   Musculoskeletal:   Negative straight leg raise   Neurological: She is alert. Gait normal.   Skin: Skin is dry.   Nearly completely healed right of midline lumbar incision with no redness drainage or swelling   Psychiatric: She has a normal mood and affect. Her behavior is normal. Judgment normal.     Neurologic Exam     Mental Status   Level of consciousness: alert  Knowledge: good.   Normal comprehension.     Motor Exam   5/5 bilateral lower extremities     Sensory Exam   Right leg light touch: " "normal  Left leg light touch: normal    Gait, Coordination, and Reflexes     Gait  Gait: normal      Findings/Results:  No new imaging    Assessment/Plan:  Allyson was seen today for post-op.    Diagnoses and all orders for this visit:    Postop check  -     Ambulatory Referral to Physical Therapy Evaluate and treat, POST OP    Chronic right-sided low back pain with right-sided sciatica  -     Ambulatory Referral to Physical Therapy Evaluate and treat, POST OP      Discussion/Summary  Patient presents for first postoperative visit after lumbar decompression for synovial cyst.  She has had complete resolution of her leg pain.  Minimal back discomfort.  She is more bothered by thoracic back discomfort likely secondary to posture and alteration in gait.  She has good strength and sensation.  Her incision is nearly completely healed.  She is in much better spirits today.  She has been given both verbal and written postoperative instructions today.  She will slowly begin her sitting after the weekend.  We will send her for some physical therapy to work on her back discomfort.  She will follow-up in 1 month and we will discuss activity restrictions at that time.  I recommend that she try some anti-inflammatory for back discomfort.  We discussed muscle relaxer, but she declined.    Plan: Return in about 1 month (around 7/25/2020) for Follow-up with Dr. Levin, After PT.         Patient Care Team    Patient Care Team:  Dav Palacios MD as PCP - General (Family Medicine)  Ilya Brewster MD as Consulting Physician (Gastroenterology)  Alka Cowart MD as Consulting Physician (Obstetrics and Gynecology)    Chelsie Yeager, APRN  6/25/2020    \"Dictated utilizing Dragon dictation\".    "

## 2020-06-25 ENCOUNTER — OFFICE VISIT (OUTPATIENT)
Dept: NEUROSURGERY | Facility: CLINIC | Age: 60
End: 2020-06-25

## 2020-06-25 VITALS
SYSTOLIC BLOOD PRESSURE: 125 MMHG | DIASTOLIC BLOOD PRESSURE: 89 MMHG | TEMPERATURE: 97.8 F | HEIGHT: 64 IN | BODY MASS INDEX: 22.2 KG/M2 | WEIGHT: 130 LBS | HEART RATE: 91 BPM

## 2020-06-25 DIAGNOSIS — M54.41 CHRONIC RIGHT-SIDED LOW BACK PAIN WITH RIGHT-SIDED SCIATICA: ICD-10-CM

## 2020-06-25 DIAGNOSIS — G89.29 CHRONIC RIGHT-SIDED LOW BACK PAIN WITH RIGHT-SIDED SCIATICA: ICD-10-CM

## 2020-06-25 DIAGNOSIS — Z09 POSTOP CHECK: Primary | ICD-10-CM

## 2020-06-25 PROCEDURE — 99024 POSTOP FOLLOW-UP VISIT: CPT | Performed by: NURSE PRACTITIONER

## 2020-07-01 ENCOUNTER — HOSPITAL ENCOUNTER (OUTPATIENT)
Dept: PHYSICAL THERAPY | Facility: HOSPITAL | Age: 60
Setting detail: THERAPIES SERIES
Discharge: HOME | End: 2020-07-01

## 2020-07-01 DIAGNOSIS — G89.29 CHRONIC RIGHT-SIDED LOW BACK PAIN WITH RIGHT-SIDED SCIATICA: Primary | ICD-10-CM

## 2020-07-01 DIAGNOSIS — M54.41 CHRONIC RIGHT-SIDED LOW BACK PAIN WITH RIGHT-SIDED SCIATICA: Primary | ICD-10-CM

## 2020-07-01 PROCEDURE — 97110 THERAPEUTIC EXERCISES: CPT | Performed by: PHYSICAL THERAPIST

## 2020-07-01 PROCEDURE — G0283 ELEC STIM OTHER THAN WOUND: HCPCS | Performed by: PHYSICAL THERAPIST

## 2020-07-01 PROCEDURE — 97161 PT EVAL LOW COMPLEX 20 MIN: CPT | Performed by: PHYSICAL THERAPIST

## 2020-07-01 RX ORDER — AMITRIPTYLINE HYDROCHLORIDE 25 MG/1
25 TABLET, FILM COATED ORAL NIGHTLY
Qty: 30 TABLET | Refills: 0 | OUTPATIENT
Start: 2020-07-01

## 2020-07-01 NOTE — THERAPY EVALUATION
Outpatient Physical Therapy Ortho Initial Evaluation   [place]     Patient Name: Allyson Wilder  : 1960  MRN: [id number]  Today's Date: 2020      Visit Date: 2020    Patient Active Problem List   Diagnosis   • Bursitis of hip   • Narrowing of intervertebral disc space   • Elevated hemoglobin A1c   • Headache   • Menopausal flushing   • Insomnia   • Migraine   • Celiac disease   • Health care maintenance   • Hyperglycemia   • Lymphocytic colitis   • Du's esophagus determined by biopsy   • Postmenopausal HRT (hormone replacement therapy)   • Trochanteric bursitis of both hips   • SI (sacroiliac) joint inflammation (CMS/HCC)   • Cellulitis of lower back   • Synovial cyst   • Synovial cyst of lumbar spine        Past Medical History:   Diagnosis Date   • Celiac disease    • Heart murmur     FUNCTIONAL   • Insomnia    • Migraine    • Postmenopausal HRT (hormone replacement therapy)    • Synovial cyst of lumbar spine         Past Surgical History:   Procedure Laterality Date   • COLONOSCOPY     • ESOPHAGOSCOPY / EGD     • LUMBAR DISCECTOMY Right 6/10/2020    Procedure: Right lumbar 4 to lumbar 5 laminectomy with removal of synovial cyst using metrx;  Surgeon: Christopher Levin MD;  Location: [facility];  Service: Neurosurgery;  Laterality: Right;   • OVARIAN CYST REMOVAL         Visit Dx:     ICD-10-CM ICD-9-CM   1. Chronic right-sided low back pain with right-sided sciatica M54.41 724.2    G89.29 724.3     338.29         Patient History     Row Name 20 0925             History    Chief Complaint  Pain;Muscle weakness  -GC      Type of Pain  Back pain  -      Date Current Problem(s) Began  06/10/20  -      Brief Description of Current Complaint  Pt is well known to this clinician as she has been seen previous for bilateral hip and LE pain. She did not get any real relief with therapy for her hips. She subsequently had an MRI done that showed a synovial cyst at L4/5. She  underwent an L4/5 laminectomy with cyst excision on 6/10. She is currently limited to 3-4 15' sits daily and still cannot do trunk flexion or any lifting. Sadaf reports her LE symptoms were immediately resolved after surgery and that she only has mild LBP and tightness. She is now referred to therapy for this issue.  -      Patient/Caregiver Goals  Relieve pain;Return to prior level of function;Improve mobility;Improve strength  -      Patient's Rating of General Health  Good  -      Hand Dominance  right-handed  -      Occupation/sports/leisure activities  pt is a crisis counselor and enjoyus yoga, tennis, and gardening  -      What clinical tests have you had for this problem?  X-ray;MRI  -      Results of Clinical Tests  cyst  -GC         Pain     Pain Location  Back  -GC      Pain at Present  1  -GC      Pain at Best  1  -GC      Pain at Worst  2  -GC      Pain Frequency  Constant/continuous  -GC      Pain Description  Aching;Discomfort;Sore;Tender  -      What Performance Factors Make the Current Problem(s) WORSE?  Pt states her pain only increases if she tries to move to far  -      What Performance Factors Make the Current Problem(s) BETTER?  Pt feels best at rest, sidelying  -GC      Difficulties at work?  Pt is currently not working due to the COVID pandemic  -      Difficulties with ADL's?  Pt is limited in her ADL function secondary to not being able to bend forward  -         Daily Activities    Primary Language  English  -      Are you able to read  Yes  -      Are you able to write  Yes  -      How does patient learn best?  Listening;Reading  -      Teaching needs identified  Home Exercise Program;Management of Condition  -      Patient is concerned about/has problems with  Flexibility;Performing home management (household chores, shopping, care of dependents);Performing job responsibilities/community activities (work, school,;Performing sports, recreation, and play  activities;Sitting  -GC      Does patient have problems with the following?  None  -GC      Barriers to learning  None  -GC      Pt Participated in POC and Goals  Yes  -GC         Safety    Are you being hurt, hit, or frightened by anyone at home or in your life?  No  -GC      Are you being neglected by a caregiver  No  -GC        User Key  (r) = Recorded By, (t) = Taken By, (c) = Cosigned By    Initials Name Provider Type    GC Filiberto Schultz, PT Physical Therapist          PT Ortho     Row Name 07/01/20 0991       Posture/Observations    Posture/Observations Comments  Pt has mild lateral shift of shoulders to right. tjhe surgical site just right of midline at L4/5 level is nicely healed with good scar mobility.   -GC       DTR- Lower Quarter Clearing    Patellar tendon (L2-4)  Bilateral:;2- Normal response  -GC    Achilles tendon (S1-2)  Bilateral:;2- Normal response  -GC       Lumbosacral Palpation    SI  Bilateral:;Tender  -GC    Lumbosacral Segment  Tender  -GC    Quadratus Lumborum  Bilateral:;Tender;Guarded/taut  -GC    Erector Spinae (Paraspinals)  Bilateral:;Tender;Guarded/taut  -GC       Head/Neck/Trunk    Trunk Extension AROM  50% range with pain  -GC    Trunk Flexion AROM  NT due to post-op restrictions  -GC       MMT Neck/Trunk    Trunk Flexion MMT, Gross Movement  (4/5) good  -GC    Trunk Extension MMT, Gross Movement  (4/5) good  -GC       Sensation    Light Touch  No apparent deficits  -GC       Lower Extremity Flexibility    Hamstrings  Bilateral:;Mildly limited  -GC    Hip External Rotators  Bilateral:;Mildly limited  -GC    Hip Internal Rotators  Bilateral:;Mildly limited  -GC    Quadratus Lumborum  Bilateral:;Mildly limited  -GC       Transfers    Comment (Transfers)  Pt is independent with all bed mobility and trnansfers  -GC       Gait/Stairs Assessment/Training    Comment (Gait/Stairs)  Pt ambulates normally on level surfaces  -GC      User Key  (r) = Recorded By, (t) = Taken By, (c) =  Cosigned By    Initials Name Provider Type     Filiberto Schultz, PT Physical Therapist                      Therapy Education  Given: HEP, Symptoms/condition management, Pain management, Posture/body mechanics  Program: New  How Provided: Verbal, Demonstration, Written  Provided to: Patient  Level of Understanding: Teach back education performed, Verbalized, Demonstrated     PT OP Goals     Row Name 07/01/20 0925          PT Short Term Goals    STG Date to Achieve  07/15/20  -GC     STG 1  Decrease LBP to 0-1/10 with activity.  -GC     STG 2  Increase hamstring, piriformis, and quadratus flexibility to WFL with testing.  -GC     STG 3  Pt will be independent with her hEP issued by this therapist.  -GC        Long Term Goals    LTG Date to Achieve  07/29/20  -GC     LTG 1  Decrease LBP to 0/10 with activity.  -GC     LTG 2  Increase trunk AROM to WFL all planes with testing.  -GC     LTG 3  Increase core strength to 5/5 all planes with testing.  -GC     LTG 4  Pt will be independent with all ADLs without pain.  -        Time Calculation    PT Goal Re-Cert Due Date  07/29/20  -       User Key  (r) = Recorded By, (t) = Taken By, (c) = Cosigned By    Initials Name Provider Type     Filiberto Schultz, PT Physical Therapist          PT Assessment/Plan     Row Name 07/01/20 0906          PT Assessment    Functional Limitations  Limitation in home management;Limitations in community activities;Limitations in functional capacity and performance;Performance in leisure activities;Performance in self-care ADL  -     Impairments  Impaired flexibility;Range of motion;Pain;Muscle strength  -     Assessment Comments  Pt presents approximately 3 weeks s/p L4/5 laminectomy with synovial cyst excision. She has LBP rated up to 2/10 with activity. she reports her LE pain is now clear. She does have decreased trunk ROM, decreased trunk and LE flexibility, decreased core strength, and decreased function secondary to the above.  -      Please refer to paper survey for additional self-reported information  Yes  -GC     Rehab Potential  Good  -GC     Patient/caregiver participated in establishment of treatment plan and goals  Yes  -GC     Patient would benefit from skilled therapy intervention  Yes  -GC        PT Plan    PT Frequency  1x/week;2x/week  -GC     Predicted Duration of Therapy Intervention (Therapy Eval)  4 weeks  -GC     Planned CPT's?  PT EVAL LOW COMPLEXITY: 01964;PT THER PROC EA 15 MIN: 97778;PT HOT OR COLD PACK TREAT MCARE;PT ELECTRICAL STIM UNATTEND:   -GC     PT Plan Comments  Pt is to continue her HEP 2x daily  -       User Key  (r) = Recorded By, (t) = Taken By, (c) = Cosigned By    Initials Name Provider Type     Filiberto Schultz, PT Physical Therapist          Modalities     Row Name 07/01/20 0925             Moist Heat    MH Applied  Yes  -GC      Location  LS spine with IFC with pt prone over 2 pillows  -GC      Rx Minutes  15 mins  -GC      MH Prior to Rx  Yes  -GC         ELECTRICAL STIMULATION    Attended/Unattended  Unattended  -      Stimulation Type  IFC  -GC      Location/Electrode Placement/Other  LS spine with MH with pt prone over 2 pillows  -GC       PT E-Stim Unattended (Manual) Minutes  15  -GC        User Key  (r) = Recorded By, (t) = Taken By, (c) = Cosigned By    Initials Name Provider Type     Filiberto Schultz, PT Physical Therapist        OP Exercises     Row Name 07/01/20 0925             Exercise 1    Exercise Name 1  Hamstring stretch-bilateral  -GC      Cueing 1  Verbal;Tactile  -GC      Reps 1  15  -GC      Time 1  10 secs  -GC         Exercise 2    Exercise Name 2  Piriformis stretch-bilateral  -GC      Cueing 2  Verbal;Tactile  -GC      Reps 2  10  -GC      Time 2  10 secs  -GC         Exercise 3    Exercise Name 3  LTR stretch-bilateral  -GC      Cueing 3  Verbal;Tactile  -GC      Reps 3  10  -GC      Time 3  10 secs  -GC         Exercise 4    Exercise Name 4  Supine clam shell vs  theraband  -GC      Cueing 4  Verbal;Tactile  -GC      Reps 4  25  -GC      Time 4  silver  -GC         Exercise 5    Exercise Name 5  Shoulder EXT vs theraband  -GC      Cueing 5  Verbal;Demo  -GC      Reps 5  25  -GC      Time 5  silver  -GC         Exercise 6    Exercise Name 6  Sholder Rows vs theraband  -GC      Cueing 6  Verbal;Demo  -GC      Reps 6  25  -GC      Time 6  silver  -GC        User Key  (r) = Recorded By, (t) = Taken By, (c) = Cosigned By    Initials Name Provider Type     Filiberto Schultz, PT Physical Therapist                                  Time Calculation:     Start Time: 0925  Stop Time: 1027  Time Calculation (min): 62 min     Therapy Charges for Today     Code Description Service Date Service Provider Modifiers Qty    97477073005 HC PT ELECTRICAL STIM UNATTENDED 7/1/2020 Filiberto Schultz, PT  1    18803803871 HC PT EVAL LOW COMPLEXITY 2 7/1/2020 Filiberto Schultz, PT GP 1    78622568902 HC PT THER PROC EA 15 MIN 7/1/2020 Filiberto Schultz, PT GP 1                    Filiberto Schultz PT  7/1/2020

## 2020-07-08 ENCOUNTER — HOSPITAL ENCOUNTER (OUTPATIENT)
Dept: PHYSICAL THERAPY | Facility: HOSPITAL | Age: 60
Setting detail: THERAPIES SERIES
Discharge: HOME | End: 2020-07-08

## 2020-07-08 DIAGNOSIS — G89.29 CHRONIC RIGHT-SIDED LOW BACK PAIN WITH RIGHT-SIDED SCIATICA: Primary | ICD-10-CM

## 2020-07-08 DIAGNOSIS — M54.41 CHRONIC RIGHT-SIDED LOW BACK PAIN WITH RIGHT-SIDED SCIATICA: Primary | ICD-10-CM

## 2020-07-08 PROCEDURE — G0283 ELEC STIM OTHER THAN WOUND: HCPCS | Performed by: PHYSICAL THERAPIST

## 2020-07-08 PROCEDURE — 97110 THERAPEUTIC EXERCISES: CPT | Performed by: PHYSICAL THERAPIST

## 2020-07-08 NOTE — THERAPY TREATMENT NOTE
Outpatient Physical Therapy Ortho Treatment Note   [facility]     Patient Name: Allyson Wilder  : 1960  MRN: [id number]  Today's Date: 2020      Visit Date: 2020    Visit Dx:    ICD-10-CM ICD-9-CM   1. Chronic right-sided low back pain with right-sided sciatica M54.41 724.2    G89.29 724.3     338.29       Patient Active Problem List   Diagnosis   • Bursitis of hip   • Narrowing of intervertebral disc space   • Elevated hemoglobin A1c   • Headache   • Menopausal flushing   • Insomnia   • Migraine   • Celiac disease   • Health care maintenance   • Hyperglycemia   • Lymphocytic colitis   • Du's esophagus determined by biopsy   • Postmenopausal HRT (hormone replacement therapy)   • Trochanteric bursitis of both hips   • SI (sacroiliac) joint inflammation (CMS/HCC)   • Cellulitis of lower back   • Synovial cyst   • Synovial cyst of lumbar spine        Past Medical History:   Diagnosis Date   • Celiac disease    • Heart murmur     FUNCTIONAL   • Insomnia    • Migraine    • Postmenopausal HRT (hormone replacement therapy)    • Synovial cyst of lumbar spine         Past Surgical History:   Procedure Laterality Date   • COLONOSCOPY     • ESOPHAGOSCOPY / EGD     • LUMBAR DISCECTOMY Right 6/10/2020    Procedure: Right lumbar 4 to lumbar 5 laminectomy with removal of synovial cyst using metrx;  Surgeon: Christopher Levin MD;  Location: [facility];  Service: Neurosurgery;  Laterality: Right;   • OVARIAN CYST REMOVAL         PT Ortho     Row Name 20       Subjective Comments    Subjective Comments  Pt states her back is feeling pretty good.  -GC       Subjective Pain    Able to rate subjective pain?  yes  -GC    Pre-Treatment Pain Level  2  -      User Key  (r) = Recorded By, (t) = Taken By, (c) = Cosigned By    Initials Name Provider Type    GC Filiberto Schultz, PT Physical Therapist                      PT Assessment/Plan     Row Name 20          PT Assessment     Assessment Comments  Pt is doing well with no significant pain and good tolerance to her exercise progression.  -GC        PT Plan    PT Plan Comments  Pt is to continue her HEP 2x daily.  -GC       User Key  (r) = Recorded By, (t) = Taken By, (c) = Cosigned By    Initials Name Provider Type    GC Filiberto Schultz, PT Physical Therapist          Modalities     Row Name 07/08/20 0825             Moist Heat    MH Applied  Yes  -GC      Location  LS spine with IFC with pt prone over 2 pillows  -GC      Rx Minutes  15 mins  -GC      MH Prior to Rx  Yes  -GC         ELECTRICAL STIMULATION    Attended/Unattended  Unattended  -GC      Stimulation Type  IFC  -GC      Location/Electrode Placement/Other  LS spine with MH with pt prone over 2 pillows  -GC       PT E-Stim Unattended (Manual) Minutes  15  -GC        User Key  (r) = Recorded By, (t) = Taken By, (c) = Cosigned By    Initials Name Provider Type    GC Filiberto Schultz, PT Physical Therapist        OP Exercises     Row Name 07/08/20 0825             Subjective Comments    Subjective Comments  Pt states her back is feeling pretty good.  -GC         Subjective Pain    Able to rate subjective pain?  yes  -GC      Pre-Treatment Pain Level  2  -GC         Exercise 1    Exercise Name 1  Hamstring stretch-bilateral  -GC      Cueing 1  Verbal;Tactile  -GC      Reps 1  15  -GC      Time 1  10 secs  -GC         Exercise 2    Exercise Name 2  Piriformis stretch-bilateral  -GC      Cueing 2  Verbal;Tactile  -GC      Reps 2  10  -GC      Time 2  10 secs  -GC         Exercise 3    Exercise Name 3  LTR stretch-bilateral  -GC      Cueing 3  Verbal;Tactile  -GC      Reps 3  10  -GC      Time 3  10 secs  -GC         Exercise 4    Exercise Name 4  Supine clam shell vs theraband  -GC      Cueing 4  Verbal;Tactile  -GC      Reps 4  25  -GC      Time 4  gold  -GC         Exercise 5    Exercise Name 5  Shoulder EXT vs theraband  -GC      Cueing 5  Verbal;Demo  -GC      Reps 5  25  -GC       Time 5  gold  -GC         Exercise 6    Exercise Name 6  Shoulder Rows vs theraband  -GC      Cueing 6  Verbal;Demo  -GC      Reps 6  25  -GC      Time 6  gold  -GC         Exercise 7    Exercise Name 7  GS  -GC      Cueing 7  Verbal;Tactile  -GC      Reps 7  20  -GC      Time 7  5 secs  -GC         Exercise 8    Exercise Name 8  Prone hip EXT over 2 pillows  -GC      Cueing 8  Verbal;Tactile  -GC      Reps 8  40x alternately  -GC         Exercise 9    Exercise Name 9  Bilateral shoulder ER vs theraband  -GC      Cueing 9  Verbal;Demo  -GC      Reps 9  25  -GC        User Key  (r) = Recorded By, (t) = Taken By, (c) = Cosigned By    Initials Name Provider Type     Filiberto Schultz, PT Physical Therapist                                          Time Calculation:   Start Time: 0825  Stop Time: 0925  Time Calculation (min): 60 min  Therapy Charges for Today     Code Description Service Date Service Provider Modifiers Qty    94750839137 HC PT ELECTRICAL STIM UNATTENDED 7/8/2020 Filiberto Schultz, PT  1    65311807748 HC PT THER PROC EA 15 MIN 7/8/2020 Filiberto Schultz, PT GP 2                    Filiberto Schultz, PT  7/8/2020

## 2020-07-15 ENCOUNTER — HOSPITAL ENCOUNTER (OUTPATIENT)
Dept: PHYSICAL THERAPY | Facility: HOSPITAL | Age: 60
Setting detail: THERAPIES SERIES
Discharge: HOME | End: 2020-07-15

## 2020-07-15 DIAGNOSIS — M54.41 CHRONIC RIGHT-SIDED LOW BACK PAIN WITH RIGHT-SIDED SCIATICA: Primary | ICD-10-CM

## 2020-07-15 DIAGNOSIS — G89.29 CHRONIC RIGHT-SIDED LOW BACK PAIN WITH RIGHT-SIDED SCIATICA: Primary | ICD-10-CM

## 2020-07-15 PROCEDURE — G0283 ELEC STIM OTHER THAN WOUND: HCPCS | Performed by: PHYSICAL THERAPIST

## 2020-07-15 PROCEDURE — 97110 THERAPEUTIC EXERCISES: CPT | Performed by: PHYSICAL THERAPIST

## 2020-07-15 NOTE — THERAPY TREATMENT NOTE
Outpatient Physical Therapy Ortho Treatment Note   [facility]     Patient Name: Allyson Wilder  : 1960  MRN: [id number]  Today's Date: 7/15/2020      Visit Date: 07/15/2020    Visit Dx:    ICD-10-CM ICD-9-CM   1. Chronic right-sided low back pain with right-sided sciatica M54.41 724.2    G89.29 724.3     338.29       Patient Active Problem List   Diagnosis   • Bursitis of hip   • Narrowing of intervertebral disc space   • Elevated hemoglobin A1c   • Headache   • Menopausal flushing   • Insomnia   • Migraine   • Celiac disease   • Health care maintenance   • Hyperglycemia   • Lymphocytic colitis   • Du's esophagus determined by biopsy   • Postmenopausal HRT (hormone replacement therapy)   • Trochanteric bursitis of both hips   • SI (sacroiliac) joint inflammation (CMS/HCC)   • Cellulitis of lower back   • Synovial cyst   • Synovial cyst of lumbar spine        Past Medical History:   Diagnosis Date   • Celiac disease    • Heart murmur     FUNCTIONAL   • Insomnia    • Migraine    • Postmenopausal HRT (hormone replacement therapy)    • Synovial cyst of lumbar spine         Past Surgical History:   Procedure Laterality Date   • COLONOSCOPY     • ESOPHAGOSCOPY / EGD     • LUMBAR DISCECTOMY Right 6/10/2020    Procedure: Right lumbar 4 to lumbar 5 laminectomy with removal of synovial cyst using metrx;  Surgeon: Christopher Levin MD;  Location: [facility];  Service: Neurosurgery;  Laterality: Right;   • OVARIAN CYST REMOVAL         PT Ortho     Row Name 07/15/20 3025       Subjective Comments    Subjective Comments  Pt states she has started to have times where she forgets about her back.  -GC      User Key  (r) = Recorded By, (t) = Taken By, (c) = Cosigned By    Initials Name Provider Type    Filiberto Caldera, PT Physical Therapist                      PT Assessment/Plan     Row Name 07/15/20 0752          PT Assessment    Assessment Comments  Pt is doing well with decreased c/o pain with her ADLs  and good tolerance to her exercise progression.  -GC        PT Plan    PT Plan Comments  Pt is to continue her HEP 2x daily.  -GC       User Key  (r) = Recorded By, (t) = Taken By, (c) = Cosigned By    Initials Name Provider Type    GC Filiberto Schultz, PT Physical Therapist          Modalities     Row Name 07/15/20 0755             Moist Heat    MH Applied  Yes  -GC      Location  LS spine with IFC with pt prone over 2 pillows  -GC      Rx Minutes  15 mins  -GC      MH Prior to Rx  Yes  -GC         ELECTRICAL STIMULATION    Attended/Unattended  Unattended  -GC      Stimulation Type  IFC  -GC      Location/Electrode Placement/Other  LS spine with MH with pt prone over 2 pillows  -GC       PT E-Stim Unattended (Manual) Minutes  15  -GC        User Key  (r) = Recorded By, (t) = Taken By, (c) = Cosigned By    Initials Name Provider Type    GC Filiberto Schultz, PT Physical Therapist        OP Exercises     Row Name 07/15/20 0759             Subjective Comments    Subjective Comments  Pt states she has started to have times where she forgets about her back.  -GC         Exercise 1    Exercise Name 1  Hamstring stretch-bilateral  -GC      Cueing 1  Verbal;Tactile  -GC      Reps 1  15  -GC      Time 1  10 secs  -GC         Exercise 2    Exercise Name 2  Piriformis stretch-bilateral  -GC      Cueing 2  Verbal;Tactile  -GC      Reps 2  10  -GC      Time 2  10 secs  -GC         Exercise 3    Exercise Name 3  LTR stretch-bilateral  -GC      Cueing 3  Verbal;Tactile  -GC      Reps 3  10  -GC      Time 3  10 secs  -GC         Exercise 4    Exercise Name 4  Supine clam shell vs theraband  -GC      Cueing 4  Verbal;Tactile  -GC      Reps 4  30  -GC      Time 4  gold  -GC         Exercise 5    Exercise Name 5  Shoulder EXT vs theraband  -GC      Cueing 5  Verbal;Demo  -GC      Reps 5  25  -GC      Time 5  gold  -GC         Exercise 6    Exercise Name 6  Shoulder Rows vs theraband  -GC      Cueing 6  Verbal;Demo  -GC      Reps 6   25  -GC      Time 6  gold  -GC         Exercise 7    Exercise Name 7  GS  -GC      Cueing 7  Verbal;Tactile  -GC      Reps 7  20  -GC      Time 7  5 secs  -GC         Exercise 8    Exercise Name 8  Prone hip EXT over 2 pillows  -GC      Cueing 8  Verbal;Tactile  -GC      Reps 8  40x alternately  -GC         Exercise 9    Exercise Name 9  Bilateral shoulder ER vs theraband  -GC      Cueing 9  Verbal;Demo  -GC      Reps 9  25  -GC         Exercise 10    Exercise Name 10  Prone chest raise over 2 pillows  -GC      Cueing 10  Verbal;Tactile  -GC      Reps 10  20  -GC         Exercise 11    Exercise Name 11  bridge vs theraband  -GC      Cueing 11  Verbal;Tactile  -GC      Reps 11  30  -GC      Time 11  gold  -GC        User Key  (r) = Recorded By, (t) = Taken By, (c) = Cosigned By    Initials Name Provider Type    GC Filiberto Schultz, PT Physical Therapist                                          Time Calculation:   Start Time: 0755  Stop Time: 0902  Time Calculation (min): 67 min  Therapy Charges for Today     Code Description Service Date Service Provider Modifiers Qty    77896326804  PT ELECTRICAL STIM UNATTENDED 7/15/2020 Filiebrto Schultz, PT  1    18057144332 HC PT THER PROC EA 15 MIN 7/15/2020 Filiberto Schultz, PT GP 2                    Filiberto Schultz PT  7/15/2020

## 2020-07-17 DIAGNOSIS — G43.909 MIGRAINE WITHOUT STATUS MIGRAINOSUS, NOT INTRACTABLE, UNSPECIFIED MIGRAINE TYPE: ICD-10-CM

## 2020-07-17 RX ORDER — SUMATRIPTAN 25 MG/1
25 TABLET, FILM COATED ORAL ONCE AS NEEDED
Qty: 9 TABLET | Refills: 0 | Status: SHIPPED | OUTPATIENT
Start: 2020-07-17 | End: 2020-10-15

## 2020-07-22 ENCOUNTER — HOSPITAL ENCOUNTER (OUTPATIENT)
Dept: PHYSICAL THERAPY | Facility: HOSPITAL | Age: 60
Setting detail: THERAPIES SERIES
Discharge: HOME | End: 2020-07-22

## 2020-07-22 DIAGNOSIS — M54.41 CHRONIC RIGHT-SIDED LOW BACK PAIN WITH RIGHT-SIDED SCIATICA: Primary | ICD-10-CM

## 2020-07-22 DIAGNOSIS — G89.29 CHRONIC RIGHT-SIDED LOW BACK PAIN WITH RIGHT-SIDED SCIATICA: Primary | ICD-10-CM

## 2020-07-22 PROCEDURE — 97110 THERAPEUTIC EXERCISES: CPT | Performed by: PHYSICAL THERAPIST

## 2020-07-22 PROCEDURE — G0283 ELEC STIM OTHER THAN WOUND: HCPCS | Performed by: PHYSICAL THERAPIST

## 2020-07-22 NOTE — THERAPY TREATMENT NOTE
Outpatient Physical Therapy Ortho Treatment Note   Conchita Alonso     Patient Name: Allyson Wilder  : 1960  MRN: [id number]  Today's Date: 2020      Visit Date: 2020    Visit Dx:    ICD-10-CM ICD-9-CM   1. Chronic right-sided low back pain with right-sided sciatica M54.41 724.2    G89.29 724.3     338.29       Patient Active Problem List   Diagnosis   • Bursitis of hip   • Narrowing of intervertebral disc space   • Elevated hemoglobin A1c   • Headache   • Menopausal flushing   • Insomnia   • Migraine   • Celiac disease   • Health care maintenance   • Hyperglycemia   • Lymphocytic colitis   • Du's esophagus determined by biopsy   • Postmenopausal HRT (hormone replacement therapy)   • Trochanteric bursitis of both hips   • SI (sacroiliac) joint inflammation (CMS/HCC)   • Cellulitis of lower back   • Synovial cyst   • Synovial cyst of lumbar spine        Past Medical History:   Diagnosis Date   • Celiac disease    • Heart murmur     FUNCTIONAL   • Insomnia    • Migraine    • Postmenopausal HRT (hormone replacement therapy)    • Synovial cyst of lumbar spine         Past Surgical History:   Procedure Laterality Date   • COLONOSCOPY     • ESOPHAGOSCOPY / EGD     • LUMBAR DISCECTOMY Right 6/10/2020    Procedure: Right lumbar 4 to lumbar 5 laminectomy with removal of synovial cyst using metrx;  Surgeon: Christopher Levin MD;  Location: [facility];  Service: Neurosurgery;  Laterality: Right;   • OVARIAN CYST REMOVAL         PT Ortho     Row Name 2086       Subjective Comments    Subjective Comments  Pt states her back is feeling pretty good and the exercises are going pretty well.  -ADDY      User Key  (r) = Recorded By, (t) = Taken By, (c) = Cosigned By    Initials Name Provider Type    Filiberto Caldera, PT Physical Therapist                      PT Assessment/Plan     Row Name 20 2942          PT Assessment    Assessment Comments  Pt is doing well with decreasing c/o pain and  imrpoving function.  -GC        PT Plan    PT Plan Comments  Pt is to continue her HEP 2x daily.  -GC       User Key  (r) = Recorded By, (t) = Taken By, (c) = Cosigned By    Initials Name Provider Type    GC Filiberto Schultz, PT Physical Therapist          Modalities     Row Name 07/22/20 0825             Moist Heat    MH Applied  Yes  -GC      Location  LS spine with IFC with pt prone over 2 pillows  -GC      Rx Minutes  15 mins  -GC      MH Prior to Rx  Yes  -GC         ELECTRICAL STIMULATION    Attended/Unattended  Unattended  -GC      Stimulation Type  IFC  -GC      Location/Electrode Placement/Other  LS spine with MH with pt prone over 2 pillows  -GC       PT E-Stim Unattended (Manual) Minutes  15  -GC        User Key  (r) = Recorded By, (t) = Taken By, (c) = Cosigned By    Initials Name Provider Type    GC Filiberto Schultz, PT Physical Therapist        OP Exercises     Row Name 07/22/20 0825             Subjective Comments    Subjective Comments  Pt states her back is feeling pretty good and the exercises are going pretty well.  -GC         Exercise 1    Exercise Name 1  Hamstring stretch-bilateral  -GC      Cueing 1  Verbal;Tactile  -GC      Reps 1  15  -GC      Time 1  10 secs  -GC         Exercise 2    Exercise Name 2  Piriformis stretch-bilateral  -GC      Cueing 2  Verbal;Tactile  -GC      Reps 2  10  -GC      Time 2  10 secs  -GC         Exercise 3    Exercise Name 3  LTR stretch-bilateral  -GC      Cueing 3  Verbal;Tactile  -GC      Reps 3  10  -GC      Time 3  10 secs  -GC         Exercise 4    Exercise Name 4  Supine clam shell vs theraband  -GC      Cueing 4  Verbal;Tactile  -GC      Reps 4  30  -GC      Time 4  gold  -GC         Exercise 5    Exercise Name 5  Shoulder EXT vs theraband  -GC      Cueing 5  Verbal;Demo  -GC      Reps 5  25  -GC      Time 5  gold  -GC         Exercise 6    Exercise Name 6  Shoulder Rows vs theraband  -GC      Cueing 6  Verbal;Demo  -GC      Reps 6  25  -GC      Time 6   gold  -GC         Exercise 7    Exercise Name 7  GS  -GC      Cueing 7  Verbal;Tactile  -GC      Reps 7  20  -GC      Time 7  5 secs  -GC         Exercise 8    Exercise Name 8  Prone hip EXT over 2 pillows  -GC      Cueing 8  Verbal;Tactile  -GC      Reps 8  40x alternately  -GC         Exercise 9    Exercise Name 9  Bilateral shoulder ER vs theraband  -GC      Cueing 9  Verbal;Demo  -GC      Reps 9  25  -GC         Exercise 10    Exercise Name 10  Prone chest raise over 2 pillows  -GC      Cueing 10  Verbal;Tactile  -GC      Reps 10  25  -GC         Exercise 11    Exercise Name 11  bridge vs theraband  -GC      Cueing 11  Verbal;Tactile  -GC      Reps 11  30  -GC      Time 11  gold  -GC        User Key  (r) = Recorded By, (t) = Taken By, (c) = Cosigned By    Initials Name Provider Type     Filiberto Schultz, PT Physical Therapist                                          Time Calculation:   Start Time: 0825  Stop Time: 0922  Time Calculation (min): 57 min  Therapy Charges for Today     Code Description Service Date Service Provider Modifiers Qty    21932426632  PT ELECTRICAL STIM UNATTENDED 7/22/2020 Filiberto Schultz, PT  1    [id number]  PT THER PROC EA 15 MIN 7/22/2020 Filiberto Schultz, PT GP 1                    Filiberto Schultz PT  7/22/2020

## 2020-07-28 DIAGNOSIS — Z00.00 HEALTH CARE MAINTENANCE: Primary | ICD-10-CM

## 2020-07-29 ENCOUNTER — APPOINTMENT (OUTPATIENT)
Dept: WOMENS IMAGING | Facility: HOSPITAL | Age: 60
End: 2020-07-29

## 2020-07-29 PROCEDURE — 77063 BREAST TOMOSYNTHESIS BI: CPT | Performed by: RADIOLOGY

## 2020-07-29 PROCEDURE — 77067 SCR MAMMO BI INCL CAD: CPT | Performed by: RADIOLOGY

## 2020-07-29 NOTE — PROGRESS NOTES
Subjective   History of Present Illness: Allyson Wilder is a 59 y.o. female is here today for follow-up. Ms. Wilder had a Right L4-5 laminectomy, medial facetectomy and removal of synovial cyst adherent to the dura using metrx and microsurgical technique microsurgical instrumentation done on 6/10/2020.    She has been doing PT since her last office visit.    Patient is wearing a mask in our office today.      History of Present Illness     This patient returns today.  She is doing well.  She has a little pain in her lateral knee but nothing on a regular basis.  Her back feels fine.  Her incision looks great.  She is concerned about some bumps on her incision but I do not see there is any evidence of anything amiss.    The following portions of the patient's history were reviewed and updated as appropriate: allergies, current medications, past family history, past medical history, past social history, past surgical history and problem list.    Review of Systems   Respiratory: Negative for chest tightness and shortness of breath.    Cardiovascular: Negative for chest pain.   Musculoskeletal: Positive for back pain.        Right intermittent pain above right knee     I have reviewed the review of systems as documented by my MA.    Objective     Vitals:    07/30/20 0953   BP: 118/83   Pulse: 83   Temp: 98 °F (36.7 °C)     There is no height or weight on file to calculate BMI.      Physical Exam   Constitutional: She is oriented to person, place, and time. She appears well-developed and well-nourished.   Neurological: She is oriented to person, place, and time.     Neurologic Exam     Mental Status   Oriented to person, place, and time.           Assessment/Plan   Independent Review of Radiographic Studies:      I personally reviewed the images from the following studies.    No new x-rays to review    Medical Decision Making:      I told the patient she can gradually return to normal activities.  She should approach new  activities gradually.  She should avoid strenuously heavy lifting.  She is to call if any problems develop in the future.    Allyson was seen today for post-op.    Diagnoses and all orders for this visit:    Follow-up examination following surgery      Return if symptoms worsen or fail to improve.

## 2020-07-30 ENCOUNTER — OFFICE VISIT (OUTPATIENT)
Dept: NEUROSURGERY | Facility: CLINIC | Age: 60
End: 2020-07-30

## 2020-07-30 VITALS — SYSTOLIC BLOOD PRESSURE: 118 MMHG | HEART RATE: 83 BPM | DIASTOLIC BLOOD PRESSURE: 83 MMHG | TEMPERATURE: 98 F

## 2020-07-30 DIAGNOSIS — Z09 FOLLOW-UP EXAMINATION FOLLOWING SURGERY: Primary | ICD-10-CM

## 2020-07-30 LAB
ALBUMIN SERPL-MCNC: 4.6 G/DL (ref 3.5–5.2)
ALBUMIN/GLOB SERPL: 3.1 G/DL
ALP SERPL-CCNC: 56 U/L (ref 39–117)
ALT SERPL-CCNC: 12 U/L (ref 1–33)
APPEARANCE UR: CLEAR
AST SERPL-CCNC: 15 U/L (ref 1–32)
BILIRUB SERPL-MCNC: 0.3 MG/DL (ref 0–1.2)
BILIRUB UR QL STRIP: NEGATIVE
BUN SERPL-MCNC: 15 MG/DL (ref 8–23)
BUN/CREAT SERPL: 20.3 (ref 7–25)
CALCIUM SERPL-MCNC: 9.3 MG/DL (ref 8.6–10.5)
CHLORIDE SERPL-SCNC: 104 MMOL/L (ref 98–107)
CHOLEST SERPL-MCNC: 222 MG/DL (ref 0–200)
CO2 SERPL-SCNC: 26.8 MMOL/L (ref 22–29)
COLOR UR: YELLOW
CREAT SERPL-MCNC: 0.74 MG/DL (ref 0.57–1)
ERYTHROCYTE [DISTWIDTH] IN BLOOD BY AUTOMATED COUNT: 12.4 % (ref 12.3–15.4)
GLOBULIN SER CALC-MCNC: 1.5 GM/DL
GLUCOSE SERPL-MCNC: 98 MG/DL (ref 65–99)
GLUCOSE UR QL: NEGATIVE
HBA1C MFR BLD: 5.4 % (ref 4.8–5.6)
HCT VFR BLD AUTO: 41.9 % (ref 34–46.6)
HDLC SERPL-MCNC: 82 MG/DL (ref 40–60)
HGB BLD-MCNC: 13.8 G/DL (ref 12–15.9)
HGB UR QL STRIP: NEGATIVE
KETONES UR QL STRIP: NEGATIVE
LDLC SERPL CALC-MCNC: 129 MG/DL (ref 0–100)
LEUKOCYTE ESTERASE UR QL STRIP: NEGATIVE
MCH RBC QN AUTO: 32.6 PG (ref 26.6–33)
MCHC RBC AUTO-ENTMCNC: 32.9 G/DL (ref 31.5–35.7)
MCV RBC AUTO: 99.1 FL (ref 79–97)
NITRITE UR QL STRIP: NEGATIVE
PH UR STRIP: 7 [PH] (ref 5–8)
PLATELET # BLD AUTO: 295 10*3/MM3 (ref 140–450)
POTASSIUM SERPL-SCNC: 4.5 MMOL/L (ref 3.5–5.2)
PROT SERPL-MCNC: 6.1 G/DL (ref 6–8.5)
PROT UR QL STRIP: NEGATIVE
RBC # BLD AUTO: 4.23 10*6/MM3 (ref 3.77–5.28)
SODIUM SERPL-SCNC: 139 MMOL/L (ref 136–145)
SP GR UR: 1.01 (ref 1–1.03)
TRIGL SERPL-MCNC: 55 MG/DL (ref 0–150)
TSH SERPL DL<=0.005 MIU/L-ACNC: 1.68 UIU/ML (ref 0.27–4.2)
UROBILINOGEN UR STRIP-MCNC: NORMAL MG/DL
VLDLC SERPL CALC-MCNC: 11 MG/DL
WBC # BLD AUTO: 4.88 10*3/MM3 (ref 3.4–10.8)

## 2020-07-30 PROCEDURE — 99024 POSTOP FOLLOW-UP VISIT: CPT | Performed by: NEUROLOGICAL SURGERY

## 2020-08-05 ENCOUNTER — OFFICE VISIT (OUTPATIENT)
Dept: FAMILY MEDICINE CLINIC | Facility: CLINIC | Age: 60
End: 2020-08-05

## 2020-08-05 VITALS
OXYGEN SATURATION: 99 % | HEART RATE: 74 BPM | BODY MASS INDEX: 22.36 KG/M2 | HEIGHT: 64 IN | SYSTOLIC BLOOD PRESSURE: 120 MMHG | DIASTOLIC BLOOD PRESSURE: 80 MMHG | WEIGHT: 131 LBS

## 2020-08-05 DIAGNOSIS — Z00.00 HEALTH CARE MAINTENANCE: Primary | ICD-10-CM

## 2020-08-05 PROBLEM — M71.30 SYNOVIAL CYST: Status: RESOLVED | Noted: 2020-06-04 | Resolved: 2020-08-05

## 2020-08-05 PROBLEM — L03.312 CELLULITIS OF LOWER BACK: Status: RESOLVED | Noted: 2020-04-23 | Resolved: 2020-08-05

## 2020-08-05 PROBLEM — R73.9 HYPERGLYCEMIA: Status: RESOLVED | Noted: 2017-04-18 | Resolved: 2020-08-05

## 2020-08-05 PROCEDURE — 99396 PREV VISIT EST AGE 40-64: CPT | Performed by: FAMILY MEDICINE

## 2020-08-05 NOTE — PROGRESS NOTES
"  Chief Complaint   Patient presents with   • Annual Exam       Subjective   Allyson Wilder is a 59 y.o. female and is here for a yearly physical exam. The patient reports problems - had successful lumbar spine cyst removal in June. feels great.    Do you take any herbs or supplements that were not prescribed by a doctor? no. If so, these will be added to active medication list.    The following portions of the patient's history were reviewed and updated as appropriate: allergies, current medications, past family history, past medical history, past social history, past surgical history and problem list.    Social and Family and Surgical History reviewed and updated today, see Rooming tab.    Health History, Preventive Measures and Vaccination flow sheets reviewed and updated today.    Patient's current medical chart in Epic; including previous office notes, Mychart messages, recent phone calls, imaging, labs, specialist's evaluation either in notes or in Media tab reviewed today.    Other outside pertinent medical information also reviewed thru Care Everywhere function is also reviewed today.    Review of Systems  Review of Systems  A comprehensive review of systems was negative.    Vitals:    08/05/20 1424   BP: 120/80   BP Location: Left arm   Patient Position: Sitting   Cuff Size: Adult   Pulse: 74   SpO2: 99%   Weight: 59.4 kg (131 lb)   Height: 162.6 cm (64\")       General Appearance:  Alert, cooperative, no distress, appears stated age   Head:  Normocephalic, without obvious abnormality, atraumatic   Eyes:  PERRL, conjunctiva/corneas clear, EOM's intact.   Ears:  Normal TM's and external ear canals, both ears   Nose: Nares normal, septum midline, mucosa normal, no drainage or sinus tenderness   Throat: Lips, mucosa, and tongue normal; teeth and gums normal   Neck: Supple, symmetrical, trachea midline, no adenopathy;   thyroid: No enlargement/tenderness/nodules; no carotid  bruit   Back:  Symmetric, no " curvature, ROM normal, no CVA tenderness   Lungs:  Clear to auscultation bilaterally, respirations unlabored   Chest wall:  No tenderness or deformity   Heart:  Regular rate and rhythm, S1 and S2 normal, no murmur, rub or gallop   Abdomen:  Soft, non-tender, bowel sounds active all four quadrants,   no masses, no organomegaly   Rectal:        Extremities: Extremities normal, atraumatic, no cyanosis or edema   Pulses: 2+ and symmetric all extremities   Skin: Skin color, texture, turgor normal, no rashes or lesions   Lymph nodes: Cervical, supraclavicular, and axillary nodes normal   Neurologic: CNII-XII intact. Normal strength, sensation and reflexes   throughout          Results for orders placed or performed in visit on 07/28/20   Lipid Panel   Result Value Ref Range    Total Cholesterol 222 (H) 0 - 200 mg/dL    Triglycerides 55 0 - 150 mg/dL    HDL Cholesterol 82 (H) 40 - 60 mg/dL    VLDL Cholesterol 11 mg/dL    LDL Cholesterol  129 (H) 0 - 100 mg/dL   Comprehensive Metabolic Panel   Result Value Ref Range    Glucose 98 65 - 99 mg/dL    BUN 15 8 - 23 mg/dL    Creatinine 0.74 0.57 - 1.00 mg/dL    eGFR Non African Am 80 >60 mL/min/1.73    eGFR African Am 97 >60 mL/min/1.73    BUN/Creatinine Ratio 20.3 7.0 - 25.0    Sodium 139 136 - 145 mmol/L    Potassium 4.5 3.5 - 5.2 mmol/L    Chloride 104 98 - 107 mmol/L    Total CO2 26.8 22.0 - 29.0 mmol/L    Calcium 9.3 8.6 - 10.5 mg/dL    Total Protein 6.1 6.0 - 8.5 g/dL    Albumin 4.60 3.50 - 5.20 g/dL    Globulin 1.5 gm/dL    A/G Ratio 3.1 g/dL    Total Bilirubin 0.3 0.0 - 1.2 mg/dL    Alkaline Phosphatase 56 39 - 117 U/L    AST (SGOT) 15 1 - 32 U/L    ALT (SGPT) 12 1 - 33 U/L   CBC (No Diff)   Result Value Ref Range    WBC 4.88 3.40 - 10.80 10*3/mm3    RBC 4.23 3.77 - 5.28 10*6/mm3    Hemoglobin 13.8 12.0 - 15.9 g/dL    Hematocrit 41.9 34.0 - 46.6 %    MCV 99.1 (H) 79.0 - 97.0 fL    MCH 32.6 26.6 - 33.0 pg    MCHC 32.9 31.5 - 35.7 g/dL    RDW 12.4 12.3 - 15.4 %    Platelets  295 140 - 450 10*3/mm3   TSH   Result Value Ref Range    TSH 1.680 0.270 - 4.200 uIU/mL   Hemoglobin A1c   Result Value Ref Range    Hemoglobin A1C 5.40 4.80 - 5.60 %   Urinalysis With Microscopic If Indicated (No Culture) - Urine, Clean Catch   Result Value Ref Range    Specific Gravity, UA 1.009 1.005 - 1.030    pH, UA 7.0 5.0 - 8.0    Color, UA Yellow     Appearance, UA Clear Clear    Leukocytes, UA Negative Negative    Protein Negative Negative    Glucose, UA Negative Negative    Ketones Negative Negative    Blood, UA Negative Negative    Bilirubin, UA Negative Negative    Urobilinogen, UA Comment     Nitrite, UA Negative Negative     Assessment/Plan   Healthy female exam.  Allyson was seen today for annual exam.    Diagnoses and all orders for this visit:    Health care maintenance      1. Labs ok  Mary Anne barboza is stable    2. Patient Counseling:  --Nutrition: Stressed importance of moderation in sodium/caffeine intake, saturated fat and cholesterol.  Discussed caloric balance, sufficient intake of fresh fruits, vegetables, fiber, calcium, iron.  --Exercise: Stressed the importance of regular exercise.   --Substance Abuse: Discussed cessation/primary prevention of tobacco, alcohol, or other drug use; driving or other dangerous activities under the influence.    --Dental health: Discussed importance of regular tooth brushing, flossing, and dental visits.  --Suggested having eyes and vision checked if needed or past due.  --Immunizations reviewed and given is needed. recommend shingles vaccine    --Discussed benefits of screening colonoscopy and or [email or web address]  3. Discussed the patient's BMI with her.  The BMI is in the acceptable range  4. Follow up in one year    There are no Patient Instructions on file for this visit.    There are no discontinued medications.     Dr. Dav Palacios MD  Family Practice  [place], [place]  [facility]

## 2020-08-10 ENCOUNTER — HOSPITAL ENCOUNTER (OUTPATIENT)
Dept: PHYSICAL THERAPY | Facility: HOSPITAL | Age: 60
Setting detail: THERAPIES SERIES
Discharge: HOME | End: 2020-08-10

## 2020-08-10 DIAGNOSIS — G89.29 CHRONIC RIGHT-SIDED LOW BACK PAIN WITH RIGHT-SIDED SCIATICA: Primary | ICD-10-CM

## 2020-08-10 DIAGNOSIS — M54.41 CHRONIC RIGHT-SIDED LOW BACK PAIN WITH RIGHT-SIDED SCIATICA: Primary | ICD-10-CM

## 2020-08-10 PROCEDURE — 97110 THERAPEUTIC EXERCISES: CPT | Performed by: PHYSICAL THERAPIST

## 2020-08-10 PROCEDURE — G0283 ELEC STIM OTHER THAN WOUND: HCPCS | Performed by: PHYSICAL THERAPIST

## 2020-08-10 NOTE — THERAPY TREATMENT NOTE
Outpatient Physical Therapy Ortho Treatment Note   Conchita Alonso     Patient Name: Allyson Wilder  : 1960  MRN: [id number]  Today's Date: 8/10/2020      Visit Date: 08/10/2020    Visit Dx:    ICD-10-CM ICD-9-CM   1. Chronic right-sided low back pain with right-sided sciatica M54.41 724.2    G89.29 724.3     338.29       Patient Active Problem List   Diagnosis   • Narrowing of intervertebral disc space   • Headache   • Menopausal flushing   • Insomnia   • Migraine   • Celiac disease   • Health care maintenance   • Lymphocytic colitis   • Du's esophagus determined by biopsy   • Postmenopausal HRT (hormone replacement therapy)   • Trochanteric bursitis of both hips   • SI (sacroiliac) joint inflammation (CMS/HCC)   • Synovial cyst of lumbar spine        Past Medical History:   Diagnosis Date   • Celiac disease    • Heart murmur     FUNCTIONAL   • Insomnia    • Migraine    • Postmenopausal HRT (hormone replacement therapy)    • Synovial cyst of lumbar spine         Past Surgical History:   Procedure Laterality Date   • COLONOSCOPY     • ESOPHAGOSCOPY / EGD     • LUMBAR DISCECTOMY Right 6/10/2020    Procedure: Right lumbar 4 to lumbar 5 laminectomy with removal of synovial cyst using metrx;  Surgeon: Christopher Levin MD;  Location: [facility];  Service: Neurosurgery;  Laterality: Right;   • OVARIAN CYST REMOVAL         PT Ortho     Row Name 08/10/20 0800       Subjective Comments    Subjective Comments  Pt states she has returned to gardening and is feeling pretty good. She saw the surgeon and has no restricitons and has been cleared to return to normal activities.  -GC       Lumbosacral Accessory Motions    PA [place]- L1  WNL  -GC    PA [place]- L2  WNL  -GC    PA [place]- L3  WNL  -GC    PA [place]- L4  WNL  -GC    PA [place]- L5  WNL  -GC    PA gl[place]- Sacral base  WNL  -GC       Head/Neck/Trunk    Trunk Extension AROM  WFL  -GC    Trunk Flexion AROM  WFL  -GC    Trunk Lt Lateral Flexion AROM  WFL  -GC     Trunk Rt Lateral Flexion AROM  WFL  -GC    Trunk Lt Rotation AROM  WFL  -GC    Trunk Rt Rotation AROM  WFL  -GC       MMT Neck/Trunk    Trunk Flexion MMT, Gross Movement  (4+/5) good plus  -GC    Trunk Extension MMT, Gross Movement  (5/5) normal  -GC       MMT Right Lower Ext    Rt Hip Flexion MMT, Gross Movement  (5/5) normal  -GC    Rt Hip Extension MMT, Gross Movement  (5/5) normal  -GC    Rt Hip ABduction MMT, Gross Movement  (5/5) normal  -GC    Rt Hip ADduction MMT, Gross Movement  (5/5) normal  -GC    Rt Hip Internal (Medial) Rotation MMT, Gross Movement  (5/5) normal  -GC    Rt Hip External (Lateral) Rotation MMT, Gross Movement  (5/5) normal  -GC    Rt Knee Extension MMT, Gross Movement  (5/5) normal  -GC    Rt Knee Flexion MMT, Gross Movement  (5/5) normal  -GC       MMT Left Lower Ext    Lt Hip Flexion MMT, Gross Movement  (5/5) normal  -GC    Lt Hip Extension MMT, Gross Movement  (5/5) normal  -GC    Lt Hip ABduction MMT, Gross Movement  (5/5) normal  -GC    Lt Hip ADduction MMT, Gross Movement  (5/5) normal  -GC    Lt Hip Internal (Medial) Rotation MMT, Gross Movement  (5/5) normal  -GC    Lt Hip External (Lateral) Rotation MMT, Gross Movement  (5/5) normal  -GC    Lt Knee Extension MMT, Gross Movement  (5/5) normal  -GC    Lt Knee Flexion MMT, Gross Movement  (5/5) normal  -GC       Lower Extremity Flexibility    Hamstrings  Bilateral:;WNL  -GC    Hip External Rotators  Bilateral:;WNL  -GC    Hip Internal Rotators  Bilateral:;WNL  -GC    Quadratus Lumborum  Bilateral:;WNL  -GC      User Key  (r) = Recorded By, (t) = Taken By, (c) = Cosigned By    Initials Name Provider Type    GC Filiberto Schultz, PT Physical Therapist                      PT Assessment/Plan     Row Name 08/10/20 0800          PT Assessment    Assessment Comments  Pt is doing well with most goals met. Feel she can continue with her HEP only.  -GC        PT Plan    PT Plan Comments  Pt will continue her HEP daily.  -GC       User Key  (r)  = Recorded By, (t) = Taken By, (c) = Cosigned By    Initials Name Provider Type    GC Filiberto Schultz, PT Physical Therapist          Modalities     Row Name 08/10/20 0800             Moist Heat    MH Applied  Yes  -GC      Location  LS spine with IFC with pt prone over 2 pillows  -GC      Rx Minutes  15 mins  -GC      MH Prior to Rx  Yes  -GC         ELECTRICAL STIMULATION    Attended/Unattended  Unattended  -GC      Stimulation Type  IFC  -GC      Location/Electrode Placement/Other  LS spine with MH with pt prone over 2 pillows  -GC       PT E-Stim Unattended (Manual) Minutes  15  -GC        User Key  (r) = Recorded By, (t) = Taken By, (c) = Cosigned By    Initials Name Provider Type    GC Filiberto Schultz, PT Physical Therapist        OP Exercises     Row Name 08/10/20 0800             Subjective Comments    Subjective Comments  Pt states she has returned to gardening and is feeling pretty good. She saw the surgeon and has no restricitons and has been cleared to return to normal activities.  -GC         Exercise 1    Exercise Name 1  Hamstring stretch-bilateral  -GC      Cueing 1  Verbal;Tactile  -GC      Reps 1  15  -GC      Time 1  10 secs  -GC         Exercise 2    Exercise Name 2  Piriformis stretch-bilateral  -GC      Cueing 2  Verbal;Tactile  -GC      Reps 2  10  -GC      Time 2  10 secs  -GC         Exercise 3    Exercise Name 3  LTR stretch-bilateral  -GC      Cueing 3  Verbal;Tactile  -GC      Reps 3  10  -GC      Time 3  10 secs  -GC         Exercise 4    Exercise Name 4  Supine clam shell vs theraband  -GC      Cueing 4  Verbal;Tactile  -GC      Reps 4  40  -GC      Time 4  gold  -GC         Exercise 5    Exercise Name 5  Shoulder EXT vs theraband  -GC      Cueing 5  Verbal;Demo  -GC      Reps 5  25  -GC      Time 5  gold  -GC         Exercise 6    Exercise Name 6  Shoulder Rows vs theraband  -GC      Cueing 6  Verbal;Demo  -GC      Reps 6  25  -GC      Time 6  gold  -GC         Exercise 7     Exercise Name 7  GS  -GC      Cueing 7  Verbal;Tactile  -GC      Reps 7  20  -GC      Time 7  5 secs  -GC         Exercise 8    Exercise Name 8  Prone hip EXT over 2 pillows  -GC      Cueing 8  Verbal;Tactile  -GC      Reps 8  40x alternately  -GC         Exercise 9    Exercise Name 9  Bilateral shoulder ER vs theraband  -GC      Cueing 9  Verbal;Demo  -GC      Reps 9  25  -GC         Exercise 10    Exercise Name 10  Prone chest raise over 2 pillows  -GC      Cueing 10  Verbal;Tactile  -GC      Reps 10  25  -GC         Exercise 11    Exercise Name 11  bridge vs theraband  -GC      Cueing 11  Verbal;Tactile  -GC      Reps 11  30  -GC      Time 11  gold  -GC         Exercise 12    Exercise Name 12  SKTC stretch-bilateral  -GC      Cueing 12  Verbal;Tactile  -GC      Reps 12  10  -GC      Time 12  10 secs  -GC         Exercise 13    Exercise Name 13  PPT with LE marching  -GC      Cueing 13  Tactile;Verbal  -GC      Reps 13  40  -GC         Exercise 14    Exercise Name 14  PPT with LE EXT  -GC      Cueing 14  Verbal;Tactile  -GC      Reps 14  40  -GC         Exercise 15    Exercise Name 15  Bridge with theraband  -GC      Cueing 15  Verbal;Demo  -GC      Reps 15  40  -GC      Time 15  gold  -GC        User Key  (r) = Recorded By, (t) = Taken By, (c) = Cosigned By    Initials Name Provider Type    Filiberto Caldera, PT Physical Therapist                       PT OP Goals     Row Name 08/10/20 0800          PT Short Term Goals    STG Date to Achieve  07/15/20  -GC     STG 1  Decrease LBP to 0-1/10 with activity.  -GC     STG 1 Progress  Met  -     STG 2  Increase hamstring, piriformis, and quadratus flexibility to WFL with testing.  -GC     STG 2 Progress  Met  -     STG 3  Pt will be independent with her HEP issued by this therapist.  -     STG 3 Progress  Met  -        Long Term Goals    LTG Date to Achieve  07/29/20  -GC     LTG 1  Decrease LBP to 0/10 with activity.  -     LTG 1 Progress  Met  -GC     LTG 2   Increase trunk AROM to WFL all planes with testing.  -     LTG 2 Progress  Met  -     LTG 3  Increase core strength to 5/5 all planes with testing.  -     LTG 3 Progress  Partially Met;Ongoing;Progressing  -GC     LTG 4  Pt will be independent with all ADLs without pain.  -     LTG 4 Progress  Met  -       User Key  (r) = Recorded By, (t) = Taken By, (c) = Cosigned By    Initials Name Provider Type     Filiberto Schultz, PT Physical Therapist                         Time Calculation:   Start Time: 0800  Stop Time: 0852  Time Calculation (min): 52 min  Therapy Charges for Today     Code Description Service Date Service Provider Modifiers Qty    93126908456 HC PT ELECTRICAL STIM UNATTENDED 8/10/2020 Filiberto Schultz, PT  1    [id number] HC PT THER PROC EA 15 MIN 8/10/2020 Filiberto Schultz, PT GP 2                    Filiberto Schutlz PT  8/10/2020

## 2020-08-12 ENCOUNTER — OFFICE (AMBULATORY)
Dept: URBAN - METROPOLITAN AREA CLINIC 75 | Facility: CLINIC | Age: 60
End: 2020-08-12
Payer: COMMERCIAL

## 2020-08-12 VITALS — HEIGHT: 64 IN | WEIGHT: 134 LBS

## 2020-08-12 DIAGNOSIS — K44.9 DIAPHRAGMATIC HERNIA WITHOUT OBSTRUCTION OR GANGRENE: ICD-10-CM

## 2020-08-12 DIAGNOSIS — K52.9 NONINFECTIVE GASTROENTERITIS AND COLITIS, UNSPECIFIED: ICD-10-CM

## 2020-08-12 DIAGNOSIS — K22.70 BARRETT'S ESOPHAGUS WITHOUT DYSPLASIA: ICD-10-CM

## 2020-08-12 DIAGNOSIS — K22.4 DYSKINESIA OF ESOPHAGUS: ICD-10-CM

## 2020-08-12 DIAGNOSIS — K29.70 GASTRITIS, UNSPECIFIED, WITHOUT BLEEDING: ICD-10-CM

## 2020-08-12 DIAGNOSIS — K26.9 DUODENAL ULCER, UNSPECIFIED AS ACUTE OR CHRONIC, WITHOUT HEM: ICD-10-CM

## 2020-08-12 DIAGNOSIS — K90.0 CELIAC DISEASE: ICD-10-CM

## 2020-08-12 PROCEDURE — 99213 OFFICE O/P EST LOW 20 MIN: CPT | Performed by: INTERNAL MEDICINE

## 2020-08-12 RX ORDER — MESALAMINE 1.2 G/1
TABLET, DELAYED RELEASE ORAL
Qty: 120 | Refills: 0 | Status: COMPLETED
End: 2022-09-06

## 2020-10-14 DIAGNOSIS — G43.909 MIGRAINE WITHOUT STATUS MIGRAINOSUS, NOT INTRACTABLE, UNSPECIFIED MIGRAINE TYPE: ICD-10-CM

## 2020-10-15 RX ORDER — SUMATRIPTAN 25 MG/1
TABLET, FILM COATED ORAL
Qty: 9 TABLET | Refills: 5 | Status: SHIPPED | OUTPATIENT
Start: 2020-10-15 | End: 2021-11-29

## 2020-11-06 ENCOUNTER — OFFICE VISIT (OUTPATIENT)
Dept: FAMILY MEDICINE CLINIC | Facility: CLINIC | Age: 60
End: 2020-11-06

## 2020-11-06 VITALS
HEIGHT: 64 IN | OXYGEN SATURATION: 99 % | DIASTOLIC BLOOD PRESSURE: 78 MMHG | WEIGHT: 134 LBS | SYSTOLIC BLOOD PRESSURE: 120 MMHG | BODY MASS INDEX: 22.88 KG/M2 | HEART RATE: 73 BPM

## 2020-11-06 DIAGNOSIS — H60.311 ACUTE DIFFUSE OTITIS EXTERNA OF RIGHT EAR: Primary | ICD-10-CM

## 2020-11-06 PROCEDURE — 99213 OFFICE O/P EST LOW 20 MIN: CPT | Performed by: FAMILY MEDICINE

## 2020-11-06 RX ORDER — NEOMYCIN SULFATE, POLYMYXIN B SULFATE, HYDROCORTISONE 3.5; 10000; 1 MG/ML; [USP'U]/ML; MG/ML
4 SOLUTION/ DROPS AURICULAR (OTIC) 3 TIMES DAILY
Qty: 1 BOTTLE | Refills: 0 | Status: SHIPPED | OUTPATIENT
Start: 2020-11-06 | End: 2021-08-06

## 2020-11-06 NOTE — PROGRESS NOTES
"  Chief Complaint   Patient presents with   • Earache     rt ear, some left/ aches, sensitive, moist when uses q tip , slight drainage in throat        Right ear Infection: Patient complains of symptoms of a URI. Symptoms include right ear drainage , right ear pain, plugged sensation in the right ear and sore throat. Onset of symptoms was a few days ago, gradually worsening since that time. She also c/o no  fever for the past 1 week .  She is drinking plenty of fluids. Evaluation to date: none. Treatment to date: none.  Ill contacts at home or school or work discussed.      Vitals:    11/06/20 1331   BP: 120/78   BP Location: Left arm   Patient Position: Sitting   Cuff Size: Adult   Pulse: 73   SpO2: 99%   Weight: 60.8 kg (134 lb)   Height: 162.6 cm (64\")     Gen: wellappearing, alert  Ears: left canal full of dried cerumen, but not swollen, non tender, I can not see the TM  The right canal is swollen and tender  Nose:  Congestion  Throat:  without exudate, some drainage, tonsils okay  Neck: no LAD  Lung: clear good air movement, regular RR  Heart: RR without murmur  Skin: no rash.      Assessment/Plan   Diagnoses and all orders for this visit:    1. Acute diffuse otitis externa of right ear (Primary)  -     neomycin-polymyxin-hydrocortisone (CORTISPORIN) 1 % solution otic solution; Administer 4 drops to the right ear 3 (Three) Times a Day.  Dispense: 1 bottle; Refill: 0             There are no Patient Instructions on file for this visit.    Tylenol or Advil as needed for pain, fever, muscle aches  Plenty of fluids  Hand washing discussed  Off work or school note given if needed.  Warm tea for throat.  Pros and cons of antibiotic use discussed    Dr. Dav Palacios MD  [place]  [facility]  "

## 2020-11-16 ENCOUNTER — TELEPHONE (OUTPATIENT)
Dept: FAMILY MEDICINE CLINIC | Facility: CLINIC | Age: 60
End: 2020-11-16

## 2020-11-16 NOTE — TELEPHONE ENCOUNTER
Patient is reporting the following symptoms:  - Ear Pain  - Congestion  - Loss of Taste  - Loss of Smell  - Headache  - Facial Pressure  Would like something called in to help. Verified [facility] on [address].    Please call and advise patient at [phone].

## 2021-03-16 ENCOUNTER — BULK ORDERING (OUTPATIENT)
Dept: CASE MANAGEMENT | Facility: OTHER | Age: 61
End: 2021-03-16

## 2021-03-16 DIAGNOSIS — Z23 IMMUNIZATION DUE: ICD-10-CM

## 2021-04-07 ENCOUNTER — OFFICE (AMBULATORY)
Dept: URBAN - METROPOLITAN AREA CLINIC 75 | Facility: CLINIC | Age: 61
End: 2021-04-07

## 2021-04-07 VITALS
OXYGEN SATURATION: 97 % | WEIGHT: 138 LBS | SYSTOLIC BLOOD PRESSURE: 122 MMHG | HEART RATE: 76 BPM | TEMPERATURE: 97.4 F | HEIGHT: 64 IN | DIASTOLIC BLOOD PRESSURE: 71 MMHG

## 2021-04-07 DIAGNOSIS — R05 COUGH: ICD-10-CM

## 2021-04-07 DIAGNOSIS — K90.0 CELIAC DISEASE: ICD-10-CM

## 2021-04-07 PROCEDURE — 99213 OFFICE O/P EST LOW 20 MIN: CPT | Performed by: INTERNAL MEDICINE

## 2021-04-07 RX ORDER — OMEPRAZOLE AND SODIUM BICARBONATE 20; 1100 MG/1; MG/1
20 CAPSULE ORAL
Qty: 30 | Refills: 4 | Status: COMPLETED
Start: 2021-04-07 | End: 2022-09-08

## 2021-04-15 ENCOUNTER — TELEPHONE (OUTPATIENT)
Dept: FAMILY MEDICINE CLINIC | Facility: CLINIC | Age: 61
End: 2021-04-15

## 2021-04-15 DIAGNOSIS — Z86.16 HISTORY OF 2019 NOVEL CORONAVIRUS DISEASE (COVID-19): Primary | ICD-10-CM

## 2021-04-16 NOTE — TELEPHONE ENCOUNTER
Spoke with patient, she said since she was diagnosed with covid in November she is having a constant cough, and unable to clear her throat.     Ok  Pulm referral  made

## 2021-04-16 NOTE — TELEPHONE ENCOUNTER
Hub to share:     Left patient voicemail that pulmonology referral has been placed, should hear from scheduling once processed through her insurance.

## 2021-04-26 ENCOUNTER — TELEPHONE (OUTPATIENT)
Dept: FAMILY MEDICINE CLINIC | Facility: CLINIC | Age: 61
End: 2021-04-26

## 2021-04-26 NOTE — TELEPHONE ENCOUNTER
I have spoke with patient a few times regarding the pulmonology referral, I gave her the office's number which she called and they told her they have not received any referral information. She said they gave her a private fax number which is  [phone] not sure if this was the fax you used?   
WDL

## 2021-07-21 DIAGNOSIS — Z00.00 HEALTH CARE MAINTENANCE: ICD-10-CM

## 2021-07-21 DIAGNOSIS — K90.0 CELIAC DISEASE: ICD-10-CM

## 2021-07-21 DIAGNOSIS — R73.09 ELEVATED HEMOGLOBIN A1C: ICD-10-CM

## 2021-07-21 DIAGNOSIS — K22.70 BARRETT'S ESOPHAGUS DETERMINED BY BIOPSY: ICD-10-CM

## 2021-07-21 DIAGNOSIS — Z00.00 HEALTH CARE MAINTENANCE: Primary | ICD-10-CM

## 2021-07-31 LAB
ALBUMIN SERPL-MCNC: 4.4 G/DL (ref 3.5–5.2)
ALBUMIN/GLOB SERPL: 2 G/DL
ALP SERPL-CCNC: 55 U/L (ref 39–117)
ALT SERPL-CCNC: 12 U/L (ref 1–33)
APPEARANCE UR: CLEAR
AST SERPL-CCNC: 20 U/L (ref 1–32)
BILIRUB SERPL-MCNC: 0.6 MG/DL (ref 0–1.2)
BILIRUB UR QL STRIP: NEGATIVE
BUN SERPL-MCNC: 16 MG/DL (ref 8–23)
BUN/CREAT SERPL: 19.5 (ref 7–25)
CALCIUM SERPL-MCNC: 9.7 MG/DL (ref 8.6–10.5)
CHLORIDE SERPL-SCNC: 102 MMOL/L (ref 98–107)
CHOLEST SERPL-MCNC: 246 MG/DL (ref 0–200)
CO2 SERPL-SCNC: 23.8 MMOL/L (ref 22–29)
COLOR UR: YELLOW
CREAT SERPL-MCNC: 0.82 MG/DL (ref 0.57–1)
ERYTHROCYTE [DISTWIDTH] IN BLOOD BY AUTOMATED COUNT: 12 % (ref 12.3–15.4)
GLOBULIN SER CALC-MCNC: 2.2 GM/DL
GLUCOSE SERPL-MCNC: 101 MG/DL (ref 65–99)
GLUCOSE UR QL: NEGATIVE
HBA1C MFR BLD: 5.4 % (ref 4.8–5.6)
HCT VFR BLD AUTO: 44.3 % (ref 34–46.6)
HDLC SERPL-MCNC: 94 MG/DL (ref 40–60)
HGB BLD-MCNC: 15.1 G/DL (ref 12–15.9)
HGB UR QL STRIP: NEGATIVE
KETONES UR QL STRIP: NEGATIVE
LDLC SERPL CALC-MCNC: 142 MG/DL (ref 0–100)
LEUKOCYTE ESTERASE UR QL STRIP: NEGATIVE
MCH RBC QN AUTO: 32.7 PG (ref 26.6–33)
MCHC RBC AUTO-ENTMCNC: 34.1 G/DL (ref 31.5–35.7)
MCV RBC AUTO: 95.9 FL (ref 79–97)
NITRITE UR QL STRIP: NEGATIVE
PH UR STRIP: 6 [PH] (ref 5–8)
PLATELET # BLD AUTO: 333 10*3/MM3 (ref 140–450)
POTASSIUM SERPL-SCNC: 4.4 MMOL/L (ref 3.5–5.2)
PROT SERPL-MCNC: 6.6 G/DL (ref 6–8.5)
PROT UR QL STRIP: NEGATIVE
RBC # BLD AUTO: 4.62 10*6/MM3 (ref 3.77–5.28)
SODIUM SERPL-SCNC: 138 MMOL/L (ref 136–145)
SP GR UR: 1.01 (ref 1–1.03)
TRIGL SERPL-MCNC: 61 MG/DL (ref 0–150)
TSH SERPL DL<=0.005 MIU/L-ACNC: 1.62 UIU/ML (ref 0.27–4.2)
UROBILINOGEN UR STRIP-MCNC: NORMAL MG/DL
VLDLC SERPL CALC-MCNC: 10 MG/DL (ref 5–40)
WBC # BLD AUTO: 4.78 10*3/MM3 (ref 3.4–10.8)

## 2021-08-06 ENCOUNTER — OFFICE VISIT (OUTPATIENT)
Dept: FAMILY MEDICINE CLINIC | Facility: CLINIC | Age: 61
End: 2021-08-06

## 2021-08-06 VITALS
SYSTOLIC BLOOD PRESSURE: 126 MMHG | HEIGHT: 64 IN | TEMPERATURE: 97.3 F | DIASTOLIC BLOOD PRESSURE: 80 MMHG | HEART RATE: 70 BPM | OXYGEN SATURATION: 97 % | BODY MASS INDEX: 23.05 KG/M2 | WEIGHT: 135 LBS

## 2021-08-06 DIAGNOSIS — E78.2 MODERATE MIXED HYPERLIPIDEMIA NOT REQUIRING STATIN THERAPY: ICD-10-CM

## 2021-08-06 DIAGNOSIS — Z00.00 HEALTH CARE MAINTENANCE: Primary | ICD-10-CM

## 2021-08-06 PROBLEM — Z86.16 HISTORY OF 2019 NOVEL CORONAVIRUS DISEASE (COVID-19): Status: ACTIVE | Noted: 2021-08-06

## 2021-08-06 PROCEDURE — 99396 PREV VISIT EST AGE 40-64: CPT | Performed by: FAMILY MEDICINE

## 2021-08-06 RX ORDER — AMPICILLIN TRIHYDRATE 250 MG
1200 CAPSULE ORAL DAILY
Qty: 1 EACH | Refills: 0 | Status: SHIPPED | OUTPATIENT
Start: 2021-08-06 | End: 2022-08-08

## 2021-08-06 RX ORDER — ZOSTER VACCINE RECOMBINANT, ADJUVANTED 50 MCG/0.5
50 KIT INTRAMUSCULAR
Qty: 1 EACH | Refills: 1 | Status: SHIPPED | OUTPATIENT
Start: 2021-08-06 | End: 2022-02-03

## 2021-08-06 RX ORDER — FINASTERIDE 5 MG/1
5 TABLET, FILM COATED ORAL DAILY
COMMUNITY
Start: 2021-06-16

## 2021-08-06 NOTE — PROGRESS NOTES
"  Chief Complaint   Patient presents with   • Annual Exam   • Neck Pain     chronic tension on right side, turns into migraines        Subjective   Allyson Wilder is a 60 y.o. female and is here for a yearly physical exam. The patient reports problems - right neck aches.    Do you take any herbs or supplements that were not prescribed by a doctor? yes. If so, these will be added to active medication list.    The following portions of the patient's history were reviewed and updated as appropriate: allergies, current medications, past family history, past medical history, past social history, past surgical history and problem list.    Social and Family and Surgical History reviewed and updated today, see Rooming tab.    Health History, Preventive Measures and Vaccination flow sheets reviewed and updated today.    Patient's current medical chart in Epic; including previous office notes, Mychart messages, recent phone calls, imaging, labs, specialist's evaluation either in notes or in Media tab reviewed today.    Other outside pertinent medical information also reviewed thru Care Everywhere function is also reviewed today.    Review of Systems  Review of Systems  A comprehensive review of systems was negative.    Vitals:    08/06/21 0812   BP: 126/80   BP Location: Left arm   Patient Position: Sitting   Cuff Size: Adult   Pulse: 70   Temp: 97.3 °F (36.3 °C)   TempSrc: Temporal   SpO2: 97%   Weight: 61.2 kg (135 lb)   Height: 162.6 cm (64\")       General Appearance:  Alert, cooperative, no distress, appears stated age   Head:  Normocephalic, without obvious abnormality, atraumatic   Eyes:  PERRL, conjunctiva/corneas clear, EOM's intact.   Ears:  Normal TM's and external ear canals, both ears   Nose: Nares normal, septum midline, mucosa normal, no drainage or sinus tenderness   Throat: Lips, mucosa, and tongue normal; teeth and gums normal   Neck: Supple, symmetrical, trachea midline, no adenopathy;   thyroid: No " enlargement/tenderness/nodules; no carotid  bruit   Back:  Symmetric, no curvature, ROM normal, no CVA tenderness   Lungs:  Clear to auscultation bilaterally, respirations unlabored   Chest wall:  No tenderness or deformity   Heart:  Regular rate and rhythm, S1 and S2 normal, no murmur, rub or gallop   Abdomen:  Soft, non-tender, bowel sounds active all four quadrants,   no masses, no organomegaly   Rectal:        Extremities: Extremities normal, atraumatic, no cyanosis or edema   Pulses: 2+ and symmetric all extremities   Skin: Skin color, texture, turgor normal, no rashes or lesions   Lymph nodes: Cervical, supraclavicular, and axillary nodes normal   Neurologic: CNII-XII intact. Normal strength, sensation and reflexes   throughout          Results for orders placed or performed in visit on 07/21/21   Hemoglobin A1c    Specimen: Blood   Result Value Ref Range    Hemoglobin A1C 5.40 4.80 - 5.60 %   Urinalysis With Microscopic If Indicated (No Culture) - Urine, Clean Catch    Specimen: Urine, Clean Catch   Result Value Ref Range    Specific Gravity, UA 1.010 1.005 - 1.030    pH, UA 6.0 5.0 - 8.0    Color, UA Yellow     Appearance, UA Clear Clear    Leukocytes, UA Negative Negative    Protein Negative Negative    Glucose, UA Negative Negative    Ketones Negative Negative    Blood, UA Negative Negative    Bilirubin, UA Negative Negative    Urobilinogen, UA Comment     Nitrite, UA Negative Negative   TSH    Specimen: Blood   Result Value Ref Range    TSH 1.620 0.270 - 4.200 uIU/mL   CBC (No Diff)    Specimen: Blood   Result Value Ref Range    WBC 4.78 3.40 - 10.80 10*3/mm3    RBC 4.62 3.77 - 5.28 10*6/mm3    Hemoglobin 15.1 12.0 - 15.9 g/dL    Hematocrit 44.3 34.0 - 46.6 %    MCV 95.9 79.0 - 97.0 fL    MCH 32.7 26.6 - 33.0 pg    MCHC 34.1 31.5 - 35.7 g/dL    RDW 12.0 (L) 12.3 - 15.4 %    Platelets 333 140 - 450 10*3/mm3   Comprehensive Metabolic Panel    Specimen: Blood   Result Value Ref Range    Glucose 101 (H) 65 -  99 mg/dL    BUN 16 8 - 23 mg/dL    Creatinine 0.82 0.57 - 1.00 mg/dL    eGFR Non African Am 71 >60 mL/min/1.73    eGFR African Am 86 >60 mL/min/1.73    BUN/Creatinine Ratio 19.5 7.0 - 25.0    Sodium 138 136 - 145 mmol/L    Potassium 4.4 3.5 - 5.2 mmol/L    Chloride 102 98 - 107 mmol/L    Total CO2 23.8 22.0 - 29.0 mmol/L    Calcium 9.7 8.6 - 10.5 mg/dL    Total Protein 6.6 6.0 - 8.5 g/dL    Albumin 4.40 3.50 - 5.20 g/dL    Globulin 2.2 gm/dL    A/G Ratio 2.0 g/dL    Total Bilirubin 0.6 0.0 - 1.2 mg/dL    Alkaline Phosphatase 55 39 - 117 U/L    AST (SGOT) 20 1 - 32 U/L    ALT (SGPT) 12 1 - 33 U/L   Lipid Panel    Specimen: Blood   Result Value Ref Range    Total Cholesterol 246 (H) 0 - 200 mg/dL    Triglycerides 61 0 - 150 mg/dL    HDL Cholesterol 94 (H) 40 - 60 mg/dL    VLDL Cholesterol Jairo 10 5 - 40 mg/dL    LDL Chol Calc (NIH) 142 (H) 0 - 100 mg/dL     Assessment/Plan   Healthy female exam.  Diagnoses and all orders for this visit:    1. Health care maintenance (Primary)  -     CBC (No Diff); Future  -     Comprehensive Metabolic Panel; Future  -     Lipid Panel; Future  -     TSH; Future  -     Urinalysis With Microscopic If Indicated (No Culture) - Urine, Clean Catch; Future    2. Moderate mixed hyperlipidemia not requiring statin therapy  -     Red Yeast Rice 600 MG capsule; Take 1,200 mg by mouth Daily.  Dispense: 1 each; Refill: 0  -     Lipid Panel; Future  -     TSH; Future    Other orders  -     Zoster Vac Recomb Adjuvanted (Shingrix) 50 MCG/0.5ML reconstituted suspension; Inject 0.5 mL into the appropriate muscle as directed by prescriber Every 6 (Six) Months for 2 doses.  Dispense: 1 each; Refill: 1      1. Labs ok  Colitis is stable,    2. Patient Counseling:  --Nutrition: Stressed importance of moderation in sodium/caffeine intake, saturated fat and cholesterol.  Discussed caloric balance, sufficient intake of fresh fruits, vegetables, fiber, calcium, iron.  --Exercise: Stressed the importance of  regular exercise.   --Substance Abuse: Discussed cessation/primary prevention of tobacco, alcohol, or other drug use; driving or other dangerous activities under the influence.    --Dental health: Discussed importance of regular tooth brushing, flossing, and dental visits.  --Suggested having eyes and vision checked if needed or past due.  --Immunizations reviewed and given if needed.  --Discussed benefits of screening colonoscopy and or ColoGuard.  3. Discussed the patient's BMI with her.  The BMI is in the acceptable range  4. Follow up in one year    There are no Patient Instructions on file for this visit.    Medications Discontinued During This Encounter   Medication Reason   • neomycin-polymyxin-hydrocortisone (CORTISPORIN) 1 % solution otic solution *Therapy completed        Dr. Dav Palacios MD  [place]  [facility]

## 2021-09-02 ENCOUNTER — OFFICE VISIT (OUTPATIENT)
Dept: FAMILY MEDICINE CLINIC | Facility: CLINIC | Age: 61
End: 2021-09-02

## 2021-09-02 VITALS
HEIGHT: 64 IN | DIASTOLIC BLOOD PRESSURE: 78 MMHG | HEART RATE: 84 BPM | WEIGHT: 136 LBS | TEMPERATURE: 97.8 F | OXYGEN SATURATION: 98 % | RESPIRATION RATE: 16 BRPM | SYSTOLIC BLOOD PRESSURE: 120 MMHG | BODY MASS INDEX: 23.22 KG/M2

## 2021-09-02 DIAGNOSIS — R30.0 DYSURIA: Primary | ICD-10-CM

## 2021-09-02 DIAGNOSIS — R31.9 HEMATURIA, UNSPECIFIED TYPE: ICD-10-CM

## 2021-09-02 LAB
BILIRUB BLD-MCNC: NEGATIVE MG/DL
CLARITY, POC: CLEAR
COLOR UR: YELLOW
GLUCOSE UR STRIP-MCNC: NEGATIVE MG/DL
KETONES UR QL: NEGATIVE
LEUKOCYTE EST, POC: NEGATIVE
NITRITE UR-MCNC: NEGATIVE MG/ML
PH UR: 6 [PH] (ref 5–8)
PROT UR STRIP-MCNC: NEGATIVE MG/DL
RBC # UR STRIP: ABNORMAL /UL
SP GR UR: 1.01 (ref 1–1.03)
UROBILINOGEN UR QL: NORMAL

## 2021-09-02 PROCEDURE — 81002 URINALYSIS NONAUTO W/O SCOPE: CPT | Performed by: NURSE PRACTITIONER

## 2021-09-02 PROCEDURE — 99213 OFFICE O/P EST LOW 20 MIN: CPT | Performed by: NURSE PRACTITIONER

## 2021-09-02 RX ORDER — PHENAZOPYRIDINE HYDROCHLORIDE 100 MG/1
100 TABLET, FILM COATED ORAL 3 TIMES DAILY PRN
Qty: 10 TABLET | Refills: 0 | Status: SHIPPED | OUTPATIENT
Start: 2021-09-02 | End: 2022-08-08

## 2021-09-02 RX ORDER — CIPROFLOXACIN 500 MG/1
500 TABLET, FILM COATED ORAL 2 TIMES DAILY
Qty: 14 TABLET | Refills: 0 | Status: SHIPPED | OUTPATIENT
Start: 2021-09-02 | End: 2021-09-09

## 2021-09-02 NOTE — PROGRESS NOTES
"Chief Complaint   Patient presents with   • Urinary Frequency   • Dysuria       Urinary Tract Infection: Patient complains of dysuria, frequency and urgency She has had symptoms for 2 days. Patient also complains of none. Patient denies back pain and fever. Patient does not have a history of recurrent UTI.  Patient does not have a history of pyelonephritis.   Getting ready to leave for vacation to [place].      The following portions of the patient's history were reviewed and updated as appropriate: allergies, current medications, past family history, past medical history, past social history, past surgical history and problem list.    Review of Systems    Vitals:    09/02/21 1028   BP: 120/78   BP Location: Left arm   Patient Position: Sitting   Cuff Size: Adult   Pulse: 84   Resp: 16   Temp: 97.8 °F (36.6 °C)   SpO2: 98%   Weight: 61.7 kg (136 lb)   Height: 162.6 cm (64\")     Gen: Well appearing, alert  HEENT: WNL  Lung: Regular RR, no audible wheeze  Heart: RR without murmur  Skin: No rash, W/D  Abd: Non tender, non distended, positive bowel sounds  Flank: No CVA, no rash    In office urine dipstick results:  Brief Urine Lab Results  (Last result in the past 365 days)      Color   Clarity   Blood   Leuk Est   Nitrite   Protein   CREAT   Urine HCG        09/02/21 1041 Yellow Clear Trace Negative Negative Negative               Assessment/Plan   Diagnoses and all orders for this visit:    1. Dysuria (Primary)  -     POCT urinalysis dipstick, manual    2. Hematuria, unspecified type  -     Urine Culture - Urine, Urine, Clean Catch    Other orders  -     ciprofloxacin (Cipro) 500 MG tablet; Take 1 tablet by mouth 2 (Two) Times a Day for 7 days.  Dispense: 14 tablet; Refill: 0  -     phenazopyridine (Pyridium) 100 MG tablet; Take 1 tablet by mouth 3 (Three) Times a Day As Needed for Bladder Spasms.  Dispense: 10 tablet; Refill: 0             Tylenol or Advil as needed for pain, fever  Plenty of fluids  OTC Azo " ok  Off work or school note given if needed.  Pros and cons of antibiotic use discussed    Jinny Camacho, ELENA  Family Practice  [facility] [place]

## 2021-09-02 NOTE — PATIENT INSTRUCTIONS

## 2021-09-04 LAB
BACTERIA UR CULT: NORMAL
BACTERIA UR CULT: NORMAL

## 2021-11-28 DIAGNOSIS — G43.909 MIGRAINE WITHOUT STATUS MIGRAINOSUS, NOT INTRACTABLE, UNSPECIFIED MIGRAINE TYPE: ICD-10-CM

## 2021-11-29 RX ORDER — SUMATRIPTAN 25 MG/1
TABLET, FILM COATED ORAL
Qty: 9 TABLET | Refills: 5 | Status: SHIPPED | OUTPATIENT
Start: 2021-11-29 | End: 2022-12-26

## 2022-04-26 ENCOUNTER — OFFICE VISIT (OUTPATIENT)
Dept: FAMILY MEDICINE CLINIC | Facility: CLINIC | Age: 62
End: 2022-04-26

## 2022-04-26 VITALS
SYSTOLIC BLOOD PRESSURE: 130 MMHG | OXYGEN SATURATION: 99 % | TEMPERATURE: 97.3 F | HEIGHT: 64 IN | BODY MASS INDEX: 23.9 KG/M2 | HEART RATE: 78 BPM | WEIGHT: 140 LBS | DIASTOLIC BLOOD PRESSURE: 80 MMHG

## 2022-04-26 DIAGNOSIS — J30.1 SEASONAL ALLERGIC RHINITIS DUE TO POLLEN: Primary | ICD-10-CM

## 2022-04-26 PROCEDURE — 99213 OFFICE O/P EST LOW 20 MIN: CPT | Performed by: FAMILY MEDICINE

## 2022-04-26 RX ORDER — AZITHROMYCIN 250 MG/1
TABLET, FILM COATED ORAL
Qty: 6 TABLET | Refills: 0 | Status: SHIPPED | OUTPATIENT
Start: 2022-04-26 | End: 2022-08-08

## 2022-04-26 NOTE — PROGRESS NOTES
"  Chief Complaint   Patient presents with   • Sinusitis     Working out in the garden over the weekend- facial pain/pressure    • Earache       Upper Respiratory Infection: Patient complains of possible sinusitis. Symptoms include congestion, cough, plugged sensation in both ears and sore throat. Onset of symptoms was several days ago, gradually worsening since that time. She also c/o congestion, nasal congestion, no  fever and productive cough with  clear colored sputum for the past 3 days .  She is drinking plenty of fluids. Evaluation to date: none. Treatment to date: none.  Ill contacts at home or school or work discussed.      Vitals:    04/26/22 1036   BP: 130/80   BP Location: Left arm   Patient Position: Sitting   Cuff Size: Adult   Pulse: 78   Temp: 97.3 °F (36.3 °C)   SpO2: 99%   Weight: 63.5 kg (140 lb)   Height: 162.6 cm (64\")     Gen: mildly ill appearing, alert  Ears: Tm's bulging without redness  Nose:  Congestion  Throat:  Red without exudate, some drainage, tonsils okay  Neck: no LAD  Lung:  good air movement, regular RR  Heart: RR without murmur  Skin: no rash.      Assessment/Plan   Diagnoses and all orders for this visit:    1. Seasonal allergic rhinitis due to pollen (Primary)  -     azithromycin (Zithromax) 250 MG tablet; Take 2 tablets the first day, then 1 tablet daily for 4 days.  Dispense: 6 tablet; Refill: 0    Claritin, Flonase, Afrin prn.           There are no Patient Instructions on file for this visit.    Tylenol or Advil as needed for pain, fever, muscle aches  Plenty of fluids  Hand washing discussed  Off work or school note given if needed.  Warm tea for throat.  Pros and cons of antibiotic use discussed    Dr. Dav Palacios MD  [place], [place]  [facility]  "

## 2022-07-19 DIAGNOSIS — E78.2 MODERATE MIXED HYPERLIPIDEMIA NOT REQUIRING STATIN THERAPY: ICD-10-CM

## 2022-07-19 DIAGNOSIS — Z00.00 HEALTH CARE MAINTENANCE: ICD-10-CM

## 2022-08-02 LAB
ALBUMIN SERPL-MCNC: 4.7 G/DL (ref 3.8–4.8)
ALBUMIN/GLOB SERPL: 2.4 {RATIO} (ref 1.2–2.2)
ALP SERPL-CCNC: 54 IU/L (ref 44–121)
ALT SERPL-CCNC: 18 IU/L (ref 0–32)
APPEARANCE UR: CLEAR
AST SERPL-CCNC: 20 IU/L (ref 0–40)
BILIRUB SERPL-MCNC: 0.5 MG/DL (ref 0–1.2)
BILIRUB UR QL STRIP: NEGATIVE
BUN SERPL-MCNC: 15 MG/DL (ref 8–27)
BUN/CREAT SERPL: 17 (ref 12–28)
CALCIUM SERPL-MCNC: 9.4 MG/DL (ref 8.7–10.3)
CHLORIDE SERPL-SCNC: 103 MMOL/L (ref 96–106)
CHOLEST SERPL-MCNC: 257 MG/DL (ref 100–199)
CO2 SERPL-SCNC: 22 MMOL/L (ref 20–29)
COLOR UR: YELLOW
CREAT SERPL-MCNC: 0.9 MG/DL (ref 0.57–1)
EGFRCR SERPLBLD CKD-EPI 2021: 72 ML/MIN/1.73
ERYTHROCYTE [DISTWIDTH] IN BLOOD BY AUTOMATED COUNT: 12.1 % (ref 11.7–15.4)
GLOBULIN SER CALC-MCNC: 2 G/DL (ref 1.5–4.5)
GLUCOSE SERPL-MCNC: 101 MG/DL (ref 65–99)
GLUCOSE UR QL STRIP: NEGATIVE
HCT VFR BLD AUTO: 46 % (ref 34–46.6)
HDLC SERPL-MCNC: 82 MG/DL
HGB BLD-MCNC: 15.3 G/DL (ref 11.1–15.9)
HGB UR QL STRIP: NEGATIVE
KETONES UR QL STRIP: ABNORMAL
LDLC SERPL CALC-MCNC: 166 MG/DL (ref 0–99)
LEUKOCYTE ESTERASE UR QL STRIP: NEGATIVE
MCH RBC QN AUTO: 32.8 PG (ref 26.6–33)
MCHC RBC AUTO-ENTMCNC: 33.3 G/DL (ref 31.5–35.7)
MCV RBC AUTO: 99 FL (ref 79–97)
MICRO URNS: ABNORMAL
NITRITE UR QL STRIP: NEGATIVE
PH UR STRIP: 5.5 [PH] (ref 5–7.5)
PLATELET # BLD AUTO: 302 X10E3/UL (ref 150–450)
POTASSIUM SERPL-SCNC: 4.3 MMOL/L (ref 3.5–5.2)
PROT SERPL-MCNC: 6.7 G/DL (ref 6–8.5)
PROT UR QL STRIP: NEGATIVE
RBC # BLD AUTO: 4.66 X10E6/UL (ref 3.77–5.28)
SODIUM SERPL-SCNC: 139 MMOL/L (ref 134–144)
SP GR UR STRIP: 1.02 (ref 1–1.03)
TRIGL SERPL-MCNC: 59 MG/DL (ref 0–149)
TSH SERPL DL<=0.005 MIU/L-ACNC: 3.08 UIU/ML (ref 0.45–4.5)
UROBILINOGEN UR STRIP-MCNC: 0.2 MG/DL (ref 0.2–1)
VLDLC SERPL CALC-MCNC: 9 MG/DL (ref 5–40)
WBC # BLD AUTO: 5 X10E3/UL (ref 3.4–10.8)

## 2022-08-08 ENCOUNTER — OFFICE VISIT (OUTPATIENT)
Dept: FAMILY MEDICINE CLINIC | Facility: CLINIC | Age: 62
End: 2022-08-08

## 2022-08-08 VITALS
BODY MASS INDEX: 24.07 KG/M2 | HEART RATE: 66 BPM | DIASTOLIC BLOOD PRESSURE: 70 MMHG | WEIGHT: 141 LBS | SYSTOLIC BLOOD PRESSURE: 128 MMHG | OXYGEN SATURATION: 98 % | TEMPERATURE: 97.3 F | HEIGHT: 64 IN

## 2022-08-08 DIAGNOSIS — Z00.00 HEALTH CARE MAINTENANCE: Primary | ICD-10-CM

## 2022-08-08 DIAGNOSIS — E78.2 MODERATE MIXED HYPERLIPIDEMIA NOT REQUIRING STATIN THERAPY: ICD-10-CM

## 2022-08-08 DIAGNOSIS — Z82.49 FAMILY HISTORY OF ANEURYSM OF BLOOD VESSEL OF BRAIN: ICD-10-CM

## 2022-08-08 DIAGNOSIS — K52.832 LYMPHOCYTIC COLITIS: ICD-10-CM

## 2022-08-08 PROCEDURE — 99396 PREV VISIT EST AGE 40-64: CPT | Performed by: FAMILY MEDICINE

## 2022-08-08 RX ORDER — ZOSTER VACCINE RECOMBINANT, ADJUVANTED 50 MCG/0.5
50 KIT INTRAMUSCULAR
Qty: 1 EACH | Refills: 1 | Status: SHIPPED | OUTPATIENT
Start: 2022-08-08 | End: 2023-02-05

## 2022-08-08 NOTE — PROGRESS NOTES
"  Chief Complaint   Patient presents with   • Annual Exam       Subjective   Allyson Wilder is a 61 y.o. female and is here for a yearly physical exam. The patient reports problems - not sleeping well.    Do you take any herbs or supplements that were not prescribed by a doctor? yes. If so, these will be added to active medication list.    The following portions of the patient's history were reviewed and updated as appropriate: allergies, current medications, past family history, past medical history, past social history, past surgical history and problem list.    Social and Family and Surgical History reviewed and updated today, see Rooming tab.    Health History, Preventive Measures and Vaccination flow sheets reviewed and updated today.    Patient's current medical chart in Epic; including previous office notes, Mychart messages, recent phone calls, imaging, labs, specialist's evaluation either in notes or in Media tab reviewed today.    Other outside pertinent medical information also reviewed thru Care Everywhere function is also reviewed today.    Review of Systems  Review of Systems  A comprehensive review of systems was negative.    Vitals:    08/08/22 0807   BP: 128/70   BP Location: Left arm   Patient Position: Sitting   Cuff Size: Adult   Pulse: 66   Temp: 97.3 °F (36.3 °C)   SpO2: 98%   Weight: 64 kg (141 lb)   Height: 162.6 cm (64\")     Body mass index is 24.2 kg/m².      General Appearance:  Alert, cooperative, no distress, appears stated age   Head:  Normocephalic, without obvious abnormality, atraumatic   Eyes:  PERRL, conjunctiva/corneas clear, EOM's intact.   Ears:  Normal TM's and external ear canals, both ears   Nose: Nares normal, septum midline, mucosa normal, no drainage or sinus tenderness   Throat: Lips, mucosa, and tongue normal; teeth and gums normal   Neck: Supple, symmetrical, trachea midline, no adenopathy;   thyroid: No enlargement/tenderness/nodules; no carotid  bruit   Back:  " Symmetric, no curvature, ROM normal, no CVA tenderness   Lungs:  Clear to auscultation bilaterally, respirations unlabored   Chest wall:  No tenderness or deformity   Heart:  Regular rate and rhythm, S1 and S2 normal, no murmur, rub or gallop   Abdomen:  Soft, non-tender, bowel sounds active all four quadrants,   no masses, no organomegaly   Rectal:        Extremities: Extremities normal, atraumatic, no cyanosis or edema   Pulses: 2+ and symmetric all extremities   Skin: Skin color, texture, turgor normal, no rashes or lesions   Lymph nodes: Cervical, supraclavicular, and axillary nodes normal   Neurologic: CNII-XII intact. Normal strength, sensation and reflexes   throughout          Results for orders placed or performed in visit on 07/19/22   Urinalysis With Microscopic If Indicated (No Culture) - Urine, Clean Catch    Specimen: Urine, Clean Catch   Result Value Ref Range    Specific Gravity, UA 1.017 1.005 - 1.030    pH, UA 5.5 5.0 - 7.5    Color, UA Yellow Yellow    Appearance, UA Clear Clear    Leukocytes, UA Negative Negative    Protein Negative Negative/Trace    Glucose, UA Negative Negative    Ketones Trace (A) Negative    Blood, UA Negative Negative    Bilirubin, UA Negative Negative    Urobilinogen, UA 0.2 0.2 - 1.0 mg/dL    Nitrite, UA Negative Negative    Microscopic Examination Comment    TSH    Specimen: Blood   Result Value Ref Range    TSH 3.080 0.450 - 4.500 uIU/mL   Lipid Panel    Specimen: Blood   Result Value Ref Range    Total Cholesterol 257 (H) 100 - 199 mg/dL    Triglycerides 59 0 - 149 mg/dL    HDL Cholesterol 82 >39 mg/dL    VLDL Cholesterol Jairo 9 5 - 40 mg/dL    LDL Chol Calc (NIH) 166 (H) 0 - 99 mg/dL   Comprehensive Metabolic Panel    Specimen: Blood   Result Value Ref Range    Glucose 101 (H) 65 - 99 mg/dL    BUN 15 8 - 27 mg/dL    Creatinine 0.90 0.57 - 1.00 mg/dL    EGFR Result 72 >59 mL/min/1.73    BUN/Creatinine Ratio 17 12 - 28    Sodium 139 134 - 144 mmol/L    Potassium 4.3 3.5 -  5.2 mmol/L    Chloride 103 96 - 106 mmol/L    Total CO2 22 20 - 29 mmol/L    Calcium 9.4 8.7 - 10.3 mg/dL    Total Protein 6.7 6.0 - 8.5 g/dL    Albumin 4.7 3.8 - 4.8 g/dL    Globulin 2.0 1.5 - 4.5 g/dL    A/G Ratio 2.4 (H) 1.2 - 2.2    Total Bilirubin 0.5 0.0 - 1.2 mg/dL    Alkaline Phosphatase 54 44 - 121 IU/L    AST (SGOT) 20 0 - 40 IU/L    ALT (SGPT) 18 0 - 32 IU/L   CBC (No Diff)    Specimen: Blood   Result Value Ref Range    WBC 5.0 3.4 - 10.8 x10E3/uL    RBC 4.66 3.77 - 5.28 x10E6/uL    Hemoglobin 15.3 11.1 - 15.9 g/dL    Hematocrit 46.0 34.0 - 46.6 %    MCV 99 (H) 79 - 97 fL    MCH 32.8 26.6 - 33.0 pg    MCHC 33.3 31.5 - 35.7 g/dL    RDW 12.1 11.7 - 15.4 %    Platelets 302 150 - 450 x10E3/uL     Assessment & Plan   Healthy female exam.  Diagnoses and all orders for this visit:    1. Health care maintenance (Primary)  -     Zoster Vac Recomb Adjuvanted (Shingrix) 50 MCG/0.5ML reconstituted suspension; Inject 0.5 mL into the appropriate muscle as directed by prescriber Every 6 (Six) Months for 2 doses.  Dispense: 1 each; Refill: 1  -     CBC (No Diff); Future  -     Comprehensive Metabolic Panel; Future  -     Lipid Panel; Future  -     TSH; Future  -     Urinalysis With Microscopic If Indicated (No Culture) - Urine, Clean Catch; Future    2. Family history of aneurysm of blood vessel of brain  -     MRI angiogram head wo contrast; Future    3. Lymphocytic colitis  -     CBC (No Diff); Future  -     Comprehensive Metabolic Panel; Future  -     Lipid Panel; Future  -     TSH; Future  -     Urinalysis With Microscopic If Indicated (No Culture) - Urine, Clean Catch; Future    4. Moderate mixed hyperlipidemia not requiring statin therapy  -     Lipid Panel; Future      1. Chol up still. Re try red yeast rice.  Try ZQuil for sleep.  Father had a fatal brain aneurysm    2. Patient Counseling:  --Nutrition: Stressed importance of moderation in sodium/caffeine intake, saturated fat and cholesterol.  Discussed caloric  balance, sufficient intake of fresh fruits, vegetables, fiber, calcium, iron.  --Exercise: Stressed the importance of regular exercise.   --Substance Abuse: Discussed cessation/primary prevention of tobacco, alcohol, or other drug use; driving or other dangerous activities under the influence.    --Dental health: Discussed importance of regular tooth brushing, flossing, and dental visits.  --Suggested having eyes and vision checked if needed or past due.  --Immunizations reviewed and given if needed.  --Discussed benefits of screening colonoscopy and or ColoGuard.  3. Discussed the patient's BMI with her.  The BMI is in the acceptable range  4. Follow up in one year    There are no Patient Instructions on file for this visit.    Medications Discontinued During This Encounter   Medication Reason   • azithromycin (Zithromax) 250 MG tablet *Therapy completed   • estradiol (VIVELLE-DOT) 0.025 MG/24HR patch Discontinued by another clinician   • phenazopyridine (Pyridium) 100 MG tablet *Therapy completed   • Red Yeast Rice 600 MG capsule *Therapy completed        Dr. Dav Palacios MD  [place], [place]  [facility]

## 2022-08-26 ENCOUNTER — TELEPHONE (OUTPATIENT)
Dept: FAMILY MEDICINE CLINIC | Facility: CLINIC | Age: 62
End: 2022-08-26

## 2022-08-26 NOTE — TELEPHONE ENCOUNTER
Patients MRI was denied due to only having one close relative with aneurysm, has to have two close relatives to be approved. Can do a peer to peer to get approved, Pt is scheduled for this next Tuesday, please advise.

## 2022-08-26 NOTE — TELEPHONE ENCOUNTER
Hub to share    Left pt detailed message that the MRI within [facility] was denied by her insurance and this is getting cancelled next week. We will re order MRI to be done at [facility] off [facility] [facility] in [place]. I left her there phone number, just needing to send the order over to them.

## 2022-08-31 ENCOUNTER — APPOINTMENT (OUTPATIENT)
Dept: MRI IMAGING | Facility: HOSPITAL | Age: 62
End: 2022-08-31

## 2022-09-08 ENCOUNTER — OFFICE (AMBULATORY)
Dept: URBAN - METROPOLITAN AREA CLINIC 75 | Facility: CLINIC | Age: 62
End: 2022-09-08

## 2022-09-08 DIAGNOSIS — R14.0 ABDOMINAL DISTENSION (GASEOUS): ICD-10-CM

## 2022-09-08 DIAGNOSIS — K90.0 CELIAC DISEASE: ICD-10-CM

## 2022-09-08 DIAGNOSIS — R13.10 DYSPHAGIA, UNSPECIFIED: ICD-10-CM

## 2022-09-08 DIAGNOSIS — R11.10 VOMITING, UNSPECIFIED: ICD-10-CM

## 2022-09-08 PROCEDURE — 99214 OFFICE O/P EST MOD 30 MIN: CPT | Performed by: INTERNAL MEDICINE

## 2022-10-06 VITALS
DIASTOLIC BLOOD PRESSURE: 62 MMHG | SYSTOLIC BLOOD PRESSURE: 110 MMHG | WEIGHT: 138 LBS | HEART RATE: 78 BPM | DIASTOLIC BLOOD PRESSURE: 72 MMHG | OXYGEN SATURATION: 93 % | SYSTOLIC BLOOD PRESSURE: 101 MMHG | TEMPERATURE: 97 F | RESPIRATION RATE: 16 BRPM | HEIGHT: 64 IN | OXYGEN SATURATION: 99 %

## 2022-10-10 ENCOUNTER — OFFICE (AMBULATORY)
Dept: URBAN - METROPOLITAN AREA PATHOLOGY 4 | Facility: PATHOLOGY | Age: 62
End: 2022-10-10

## 2022-10-10 ENCOUNTER — AMBULATORY SURGICAL CENTER (AMBULATORY)
Dept: URBAN - METROPOLITAN AREA SURGERY 17 | Facility: SURGERY | Age: 62
End: 2022-10-10

## 2022-10-10 DIAGNOSIS — R13.10 DYSPHAGIA, UNSPECIFIED: ICD-10-CM

## 2022-10-10 DIAGNOSIS — K29.50 UNSPECIFIED CHRONIC GASTRITIS WITHOUT BLEEDING: ICD-10-CM

## 2022-10-10 DIAGNOSIS — K22.2 ESOPHAGEAL OBSTRUCTION: ICD-10-CM

## 2022-10-10 DIAGNOSIS — R10.13 EPIGASTRIC PAIN: ICD-10-CM

## 2022-10-10 DIAGNOSIS — K29.70 GASTRITIS, UNSPECIFIED, WITHOUT BLEEDING: ICD-10-CM

## 2022-10-10 DIAGNOSIS — K90.0 CELIAC DISEASE: ICD-10-CM

## 2022-10-10 DIAGNOSIS — K44.9 DIAPHRAGMATIC HERNIA WITHOUT OBSTRUCTION OR GANGRENE: ICD-10-CM

## 2022-10-10 DIAGNOSIS — K22.70 BARRETT'S ESOPHAGUS WITHOUT DYSPLASIA: ICD-10-CM

## 2022-10-10 PROBLEM — K31.89 OTHER DISEASES OF STOMACH AND DUODENUM: Status: ACTIVE | Noted: 2022-10-10

## 2022-10-10 LAB
GI HISTOLOGY: A. SELECT: (no result)
GI HISTOLOGY: PDF REPORT: (no result)

## 2022-10-10 PROCEDURE — 43239 EGD BIOPSY SINGLE/MULTIPLE: CPT | Mod: 59 | Performed by: INTERNAL MEDICINE

## 2022-10-10 PROCEDURE — 43249 ESOPH EGD DILATION <30 MM: CPT | Performed by: INTERNAL MEDICINE

## 2022-10-10 PROCEDURE — 88342 IMHCHEM/IMCYTCHM 1ST ANTB: CPT | Performed by: INTERNAL MEDICINE

## 2022-10-10 PROCEDURE — 88305 TISSUE EXAM BY PATHOLOGIST: CPT | Performed by: INTERNAL MEDICINE

## 2022-10-10 RX ORDER — PANTOPRAZOLE SODIUM 40 MG/1
40 TABLET, DELAYED RELEASE ORAL
Qty: 30 | Refills: 3 | Status: COMPLETED
Start: 2022-10-10 | End: 2022-12-02

## 2022-12-02 ENCOUNTER — OFFICE (AMBULATORY)
Dept: URBAN - METROPOLITAN AREA CLINIC 76 | Facility: CLINIC | Age: 62
End: 2022-12-02

## 2022-12-02 VITALS
HEIGHT: 64 IN | WEIGHT: 142 LBS | OXYGEN SATURATION: 93 % | HEART RATE: 63 BPM | SYSTOLIC BLOOD PRESSURE: 102 MMHG | DIASTOLIC BLOOD PRESSURE: 981 MMHG

## 2022-12-02 DIAGNOSIS — Z12.11 ENCOUNTER FOR SCREENING FOR MALIGNANT NEOPLASM OF COLON: ICD-10-CM

## 2022-12-02 DIAGNOSIS — K90.0 CELIAC DISEASE: ICD-10-CM

## 2022-12-02 DIAGNOSIS — K22.2 ESOPHAGEAL OBSTRUCTION: ICD-10-CM

## 2022-12-02 DIAGNOSIS — R13.10 DYSPHAGIA, UNSPECIFIED: ICD-10-CM

## 2022-12-02 DIAGNOSIS — K22.70 BARRETT'S ESOPHAGUS WITHOUT DYSPLASIA: ICD-10-CM

## 2022-12-02 DIAGNOSIS — K52.9 NONINFECTIVE GASTROENTERITIS AND COLITIS, UNSPECIFIED: ICD-10-CM

## 2022-12-02 DIAGNOSIS — R10.13 EPIGASTRIC PAIN: ICD-10-CM

## 2022-12-02 PROBLEM — K22.4 DYSKINESIA OF ESOPHAGUS: Status: ACTIVE | Noted: 2018-03-09

## 2022-12-02 PROBLEM — K29.70 GASTRITIS, UNSPECIFIED, WITHOUT BLEEDING: Status: ACTIVE | Noted: 2018-03-09

## 2022-12-02 PROCEDURE — 99213 OFFICE O/P EST LOW 20 MIN: CPT | Performed by: INTERNAL MEDICINE

## 2022-12-24 DIAGNOSIS — G43.909 MIGRAINE WITHOUT STATUS MIGRAINOSUS, NOT INTRACTABLE, UNSPECIFIED MIGRAINE TYPE: ICD-10-CM

## 2022-12-26 RX ORDER — SUMATRIPTAN 25 MG/1
TABLET, FILM COATED ORAL
Qty: 9 TABLET | Refills: 5 | Status: SHIPPED | OUTPATIENT
Start: 2022-12-26

## 2023-03-06 ENCOUNTER — TELEPHONE (OUTPATIENT)
Dept: NEUROSURGERY | Facility: CLINIC | Age: 63
End: 2023-03-06
Payer: COMMERCIAL

## 2023-03-06 DIAGNOSIS — M79.18 BUTTOCK PAIN: Primary | ICD-10-CM

## 2023-03-06 NOTE — TELEPHONE ENCOUNTER
Caller: Allyson Wilder    Relationship to patient: Self    Best call back number:  [phone]    Chief complaint: BUTTOCKS PAIN     Type of visit: FOLLOW UP EXTENDED     Requested date: 3/6/23    If rescheduling, when is the original appointment: N/A     Additional notes:    PATIENT CALLED AND IS REQUESTING TO SEE DR. RODGERS - HAS BEEN HAVING BUTTOCKS PAIN AND REMEMBERS THIS IS WHAT SHE FELT LIKE BEFORE HAVING CYST REMOVED.  PATIENT ALSO WANTS TO KNOW IF SHE NEEDS TO HAVE AN MRI BEFORE HER VISIT.  PLEASE CALL PATIENT AND ADVICE.  THANK YOU!

## 2023-03-07 RX ORDER — METHYLPREDNISOLONE 4 MG/1
TABLET ORAL
Qty: 1 EACH | Refills: 0 | Status: SHIPPED | OUTPATIENT
Start: 2023-03-07

## 2023-03-07 NOTE — TELEPHONE ENCOUNTER
PATIENT CALLED BACK AND GOT HER SCHEDULED IN FOR 3/28/23.    PATIENT NOW WANTS TO KNOW IF SHE CAN GET ANY PAIN MEDICATION IN THE MEAN TIME AND IF SHE NEEDS AN MRI TO PLEASE SEND IN THE ORDER.  PLEASE CALL PATIENT AND ADVICE.  THANK YOU!

## 2023-03-07 NOTE — TELEPHONE ENCOUNTER
"S/w patient and informed per Dr. Levin     \"We have not seen her for almost 3 years.  It is okay with me to try Medrol Dosepak but that is all we can do at this point until I see her. \"    Patient expressed understanding  "

## 2023-03-27 NOTE — PROGRESS NOTES
Subjective   Patient ID: Allyson Wilder is a 62 y.o. female is here today for follow-up with buttock pain.    Today patient states soreness of both buttock and some lower back pain and both feet feel sore.     Patient, Provider, and MA are all wearing a mask in our office today    History of Present Illness    This patient has been having pain in what she describes as her buttocks but when she points it is right over her sacroiliac joints on both sides.  It is worse on the right than the left but it is on both sides.  She also has some spreading of her toes and her feet but no radiation down her legs.  She did have a right L4-5 laminectomy for synovial cyst in June 2020.    The following portions of the patient's history were reviewed and updated as appropriate: allergies, current medications, past family history, past medical history, past social history, past surgical history and problem list.    Review of Systems   Constitutional: Negative for chills.   HENT: Negative for congestion.    Eyes: Negative for visual disturbance.   Musculoskeletal: Positive for back pain and myalgias. Negative for gait problem.   Neurological: Positive for headaches. Negative for dizziness, weakness and numbness.       I reviewed the review of systems listed by the patient and discussed by my MA    Objective     Vitals:    03/28/23 1428   BP: 124/72   Pulse: 76   Temp: 96.9 °F (36.1 °C)   SpO2: 97%     There is no height or weight on file to calculate BMI.    Tobacco Use: Medium Risk   • Smoking Tobacco Use: Former   • Smokeless Tobacco Use: Never   • Passive Exposure: Not on file          Physical Exam  Eyes:      Extraocular Movements: EOM normal.      Pupils: Pupils are equal, round, and reactive to light.   Neurological:      Mental Status: She is alert and oriented to person, place, and time.      Coordination: Finger-Nose-Finger Test and Heel to Shin Test normal.      Gait: Gait is intact.      Deep Tendon Reflexes:      Reflex  Scores:       Tricep reflexes are 2+ on the right side and 2+ on the left side.       Bicep reflexes are 2+ on the right side and 2+ on the left side.       Brachioradialis reflexes are 2+ on the right side and 2+ on the left side.       Patellar reflexes are 2+ on the right side and 2+ on the left side.       Achilles reflexes are 2+ on the right side and 2+ on the left side.  Psychiatric:         Speech: Speech normal.       Neurologic Exam     Mental Status   Oriented to person, place, and time.   Registration of memory: Good recent and remote memory.   Attention: normal. Concentration: normal.   Speech: speech is normal   Level of consciousness: alert  Knowledge: consistent with education.     Cranial Nerves     CN II   Visual fields full to confrontation.   Visual acuity: normal    CN III, IV, VI   Pupils are equal, round, and reactive to light.  Extraocular motions are normal.     CN V   Facial sensation intact.   Right corneal reflex: normal  Left corneal reflex: normal    CN VII   Facial expression full, symmetric.   Right facial weakness: none  Left facial weakness: none    CN VIII   Hearing: intact    CN IX, X   Palate: symmetric    CN XI   Right sternocleidomastoid strength: normal  Left sternocleidomastoid strength: normal    CN XII   Tongue: not atrophic  Tongue deviation: none    Motor Exam   Muscle bulk: normal  Right arm tone: normal  Left arm tone: normal  Right leg tone: normal  Left leg tone: normal    Strength   Strength 5/5 except as noted.     Sensory Exam   Light touch normal.     Gait, Coordination, and Reflexes     Gait  Gait: normal    Coordination   Finger to nose coordination: normal  Heel to shin coordination: normal    Reflexes   Right brachioradialis: 2+  Left brachioradialis: 2+  Right biceps: 2+  Left biceps: 2+  Right triceps: 2+  Left triceps: 2+  Right patellar: 2+  Left patellar: 2+  Right achilles: 2+  Left achilles: 2+  Right : 2+  Left : 2+          Assessment & Plan    Independent Review of Radiographic Studies:      I personally reviewed the images from the following studies.    There is no new imaging to review    Medical Decision Making:      I told the patient it really sounds like she has sacroiliitis.  I recommended that we try her on a sacroiliac injection bilaterally.  She also has a Medrol Dosepak which she has not used yet.    Diagnoses and all orders for this visit:    1. SI (sacroiliac) joint inflammation (HCC) (Primary)  -     SI Joint Injection      Return for Recheck and call after treatment or consultation.

## 2023-03-28 ENCOUNTER — OFFICE VISIT (OUTPATIENT)
Dept: NEUROSURGERY | Facility: CLINIC | Age: 63
End: 2023-03-28
Payer: COMMERCIAL

## 2023-03-28 VITALS
SYSTOLIC BLOOD PRESSURE: 124 MMHG | OXYGEN SATURATION: 97 % | HEART RATE: 76 BPM | TEMPERATURE: 96.9 F | DIASTOLIC BLOOD PRESSURE: 72 MMHG

## 2023-03-28 DIAGNOSIS — M46.1 SI (SACROILIAC) JOINT INFLAMMATION: Primary | ICD-10-CM

## 2023-03-28 PROCEDURE — 99214 OFFICE O/P EST MOD 30 MIN: CPT | Performed by: NEUROLOGICAL SURGERY

## 2023-03-30 ENCOUNTER — ANESTHESIA EVENT (OUTPATIENT)
Dept: PAIN MEDICINE | Facility: HOSPITAL | Age: 63
End: 2023-03-30
Payer: COMMERCIAL

## 2023-03-30 ENCOUNTER — HOSPITAL ENCOUNTER (OUTPATIENT)
Dept: GENERAL RADIOLOGY | Facility: HOSPITAL | Age: 63
Discharge: HOME | End: 2023-03-30
Payer: COMMERCIAL

## 2023-03-30 ENCOUNTER — ANESTHESIA (OUTPATIENT)
Dept: PAIN MEDICINE | Facility: HOSPITAL | Age: 63
End: 2023-03-30
Payer: COMMERCIAL

## 2023-03-30 ENCOUNTER — HOSPITAL ENCOUNTER (OUTPATIENT)
Dept: PAIN MEDICINE | Facility: HOSPITAL | Age: 63
Discharge: HOME | End: 2023-03-30
Payer: COMMERCIAL

## 2023-03-30 VITALS
RESPIRATION RATE: 11 BRPM | SYSTOLIC BLOOD PRESSURE: 157 MMHG | TEMPERATURE: 98.7 F | DIASTOLIC BLOOD PRESSURE: 87 MMHG | OXYGEN SATURATION: 99 % | HEART RATE: 75 BPM

## 2023-03-30 DIAGNOSIS — M46.1 SI (SACROILIAC) JOINT INFLAMMATION: Primary | ICD-10-CM

## 2023-03-30 DIAGNOSIS — R52 PAIN: ICD-10-CM

## 2023-03-30 PROCEDURE — 77003 FLUOROGUIDE FOR SPINE INJECT: CPT

## 2023-03-30 PROCEDURE — 25010000002 METHYLPREDNISOLONE PER 80 MG: Performed by: ANESTHESIOLOGY

## 2023-03-30 RX ORDER — SODIUM CHLORIDE 0.9 % (FLUSH) 0.9 %
10 SYRINGE (ML) INJECTION AS NEEDED
Status: DISCONTINUED | OUTPATIENT
Start: 2023-03-30 | End: 2023-03-31 | Stop reason: HOSPADM

## 2023-03-30 RX ORDER — METHYLPREDNISOLONE ACETATE 80 MG/ML
80 INJECTION, SUSPENSION INTRA-ARTICULAR; INTRALESIONAL; INTRAMUSCULAR; SOFT TISSUE ONCE
Status: COMPLETED | OUTPATIENT
Start: 2023-03-30 | End: 2023-03-30

## 2023-03-30 RX ORDER — MIDAZOLAM HYDROCHLORIDE 1 MG/ML
1 INJECTION INTRAMUSCULAR; INTRAVENOUS ONCE AS NEEDED
Status: DISCONTINUED | OUTPATIENT
Start: 2023-03-30 | End: 2023-03-31 | Stop reason: HOSPADM

## 2023-03-30 RX ORDER — FENTANYL CITRATE 50 UG/ML
50 INJECTION, SOLUTION INTRAMUSCULAR; INTRAVENOUS ONCE
Status: DISCONTINUED | OUTPATIENT
Start: 2023-03-30 | End: 2023-03-31 | Stop reason: HOSPADM

## 2023-03-30 RX ORDER — BUPIVACAINE HYDROCHLORIDE 5 MG/ML
10 INJECTION, SOLUTION EPIDURAL; INTRACAUDAL ONCE
Status: COMPLETED | OUTPATIENT
Start: 2023-03-30 | End: 2023-03-30

## 2023-03-30 RX ORDER — SODIUM CHLORIDE 0.9 % (FLUSH) 0.9 %
3 SYRINGE (ML) INJECTION EVERY 12 HOURS SCHEDULED
Status: DISCONTINUED | OUTPATIENT
Start: 2023-03-30 | End: 2023-03-31 | Stop reason: HOSPADM

## 2023-03-30 RX ORDER — LIDOCAINE HYDROCHLORIDE 10 MG/ML
1 INJECTION, SOLUTION INFILTRATION; PERINEURAL ONCE
Status: DISCONTINUED | OUTPATIENT
Start: 2023-03-30 | End: 2023-03-31 | Stop reason: HOSPADM

## 2023-03-30 RX ADMIN — METHYLPREDNISOLONE ACETATE 80 MG: 80 INJECTION, SUSPENSION INTRA-ARTICULAR; INTRALESIONAL; INTRAMUSCULAR; SOFT TISSUE at 14:41

## 2023-03-30 RX ADMIN — BUPIVACAINE HYDROCHLORIDE 10 ML: 5 INJECTION, SOLUTION EPIDURAL; INTRACAUDAL; PERINEURAL at 14:41

## 2023-03-30 NOTE — ANESTHESIA PROCEDURE NOTES
PAIN SI joint injection      Patient reassessed immediately prior to procedure    Patient location during procedure: Pain Clinic  Start time: 3/30/2023 2:38 PM  Stop time: 3/30/2023 3:05 PM    Reason for block: procedure for pain  Performed by  Anesthesiologist: Yevgeniy Webb MD  Preanesthetic Checklist  Completed: patient identified, site marked, risks and benefits discussed, surgical consent, monitors and equipment checked, pre-op evaluation and timeout performed  Prep:  Patient position: prone  Sterile barriers:gloves, mask, sterile barrier and cap  Prep: ChloraPrep  Patient monitoring: blood pressure monitoring, continuous pulse oximetry and EKG  Procedure:  Sedation:no  Guidance:fluoroscopy  Contrast Medium:no  Location:Bilateral  Needle Type:Quincke  Needle Gauge:22 G  Aspiration:negative  Medications:  Depomedrol:80 mg  Comment:1 ml of 1/2% bupivacaine.    Post Assessment  Injection Assessment: negative  Patient Tolerance:comfortable throughout block  Complications:no  Additional Notes  Injection was performed under fluoroscopic guidance.    Post-Op Diagnosis Codes:     * Sacroiliitis (HCC) (M46.1)

## 2023-03-30 NOTE — H&P
[facility]    History and Physical    Patient Name: Allyson Wilder  :  1960  MRN:  [id number]  Date of Admission: 3/30/2023    Subjective     Patient is a 62 y.o. female presents with chief complaint of acute, constant, severe bilateral sacral pain.  Onset of symptoms was gradual starting 1 month ago.  Symptoms are associated/aggravated by activity, lying down or sitting. Symptoms improve with massage and relaxation.  On a pain scale from 0-10, she rates her pain as a 7 while at rest and a 9 with activity.  She describes the pain as deep and stabbing in nature.  She was referred to the pain clinic for a series of bilateral sacroiliac joint injections.    FINDINGS: The alignment of the lumbar spine is within normal limits.  There is mild disc desiccation with preservation of disc height at  L4-L5. The conus is at L1 and the caudal aspect of the spinal cord  appears unremarkable.     L1-L2: There is no evidence of disc bulge or herniation.     L2-L3: Mild facet degenerative disease is present bilaterally.     L3-L4: Mild facet degenerative disease is present bilaterally.     L4-L5: Mild-to-moderate facet degenerative disease is present  bilaterally. A moderate-to-large synovial cyst is present on the right  measuring approximately 11 x 5 mm in the transverse planes and  approximately 15 mm in the craniocaudal dimension. This results in  severe lateral recess narrowing on the right. The synovial cyst  contributes to mild-to-moderate canal stenosis centrally as well. There  is mild neural foraminal compromise on the right secondary to extension  of a small disc osteophyte complex into the neural foramen. Moderate  facet degenerative disease is present bilaterally.     L5-S1: There is mild-to-moderate degenerative disease bilaterally,  slightly more prominent on the right. There is no evidence of disc bulge  or herniation.     Tarlov cysts are appreciated posterior to S2.    The following portions of the  patients history were reviewed and updated as appropriate: current medications, allergies, past medical history, past surgical history, past family history, past social history and problem list                Objective     Past Medical History:   Past Medical History:   Diagnosis Date   • Heart murmur     FUNCTIONAL   • Synovial cyst of lumbar spine      Past Surgical History:   Past Surgical History:   Procedure Laterality Date   • COLONOSCOPY     • ESOPHAGOSCOPY / EGD     • LUMBAR DISCECTOMY Right 6/10/2020    Procedure: Right lumbar 4 to lumbar 5 laminectomy with removal of synovial cyst using metrx;  Surgeon: Christopher Levin MD;  Location: [facility];  Service: Neurosurgery;  Laterality: Right;   • OVARIAN CYST REMOVAL       Family History:   Family History   Problem Relation Age of Onset   • Alcohol abuse Mother    • Cirrhosis Mother    • Migraines Mother    • Stroke Father    • Anuerysm Father 64   • No Known Problems Brother         1/2 brother   • Depression Maternal Grandmother    • Depression Paternal Grandmother    • Malig Hyperthermia Neg Hx      Social History:   Social History     Socioeconomic History   • Marital status:    • Number of children: 0   • Years of education: college grad   Tobacco Use   • Smoking status: Former     Packs/day: 0.50     Years: 10.00     Pack years: 5.00     Types: Cigarettes     Start date:      Quit date:      Years since quittin.2   • Smokeless tobacco: Never   • Tobacco comments:     exercise daily   Vaping Use   • Vaping Use: Never used   Substance and Sexual Activity   • Alcohol use: Yes     Alcohol/week: 14.0 standard drinks     Types: 14 Glasses of wine per week   • Drug use: No   • Sexual activity: Not Currently       Vital Signs Range for the last 24 hours  Temperature: Temp:  [37.1 °C (98.7 °F)] 37.1 °C (98.7 °F)   Temp Source: Temp src: Temporal   BP: BP: (164)/(100) 164/100   Pulse: Heart Rate:  [75] 75   Respirations: Resp:  [14] 14    SPO2: SpO2:  [99 %] 99 %   O2 Amount (l/min):     O2 Devices     Weight:           --------------------------------------------------------------------------------    Current Outpatient Medications   Medication Sig Dispense Refill   • finasteride (PROSCAR) 5 MG tablet Take 1 tablet by mouth Daily.     • mesalamine (LIALDA) 1.2 G EC tablet Take 1 tablet by mouth Daily.     • methylPREDNISolone (MEDROL) 4 MG dose pack Take as directed on package instructions. 1 each 0   • MINIVELLE 0.0375 MG/24HR Place 1 patch on the skin as directed by provider 2 (Two) Times a Week.     • progesterone (PROMETRIUM) 100 MG capsule Take 1 capsule by mouth Daily.     • SUMAtriptan (IMITREX) 25 MG tablet TAKE 1 TABLET BY MOUTH 1 TIME AS NEEDED FOR MIGRAINE 9 tablet 5     No current facility-administered medications for this encounter.       --------------------------------------------------------------------------------  Assessment & Plan      Anesthesia Evaluation     Patient summary reviewed and Nursing notes reviewed         Pain impairs ability to perform ADLs: Sleeping  Modalities previously tried to control pain with limited effectiveness within the last 4-6 weeks: Ice, Rest and OTC medications     Airway   Mallampati: II  TM distance: >3 FB  Neck ROM: full  Dental - normal exam     Pulmonary - normal exam    breath sounds clear to auscultation  (+) a smoker (5 pack years) Former,   Cardiovascular - normal exam    Rhythm: regular  Rate: normal    (+) valvular problems/murmurs murmur, hyperlipidemia,   (-) angina, orthopnea, PND, BARRETT      Neuro/Psych- negative ROS  Headaches: Migraine.  GI/Hepatic/Renal/Endo - negative ROS     Musculoskeletal         PE comment: Provocation tests:    Distraction is positive bilaterally  Compression is negative bilaterally  Gaenslen's is negative bilaterally  Abdominal    Substance History - negative use     OB/GYN negative ob/gyn ROS         Other   arthritis,                 Diagnosis and  Plan    Treatment Plan  ASA 2      Procedures: Sacroiliac joint injection, With fluoroscopy,       Anesthetic plan and risks discussed with patient.        Alternative management options include physical therapy, medical management with nonsteroidal anti-inflammatory medications or narcotics, chiropractic manipulation and surgical intervention.    1.    Sacroiliac joint injections, up to 3, spaced 1-2 weeks apart.  If pain control is acceptable after 1 or 2 injections, it was discussed with the patient that they may return for the subsequent injections if and when their pain returns.  The risks were discussed with the patient including failure of relief, worsening pain, Headache (post dural puncture headache), bleeding (epidural hematoma) and infection (epidural abscess or skin infection).  2.  Physical therapy exercises at home as prescribed by physical therapy or from the pain clinic handout (given to the patient).  Continuation of these exercises every day, or multiple times per week, even when the patient has good pain relief, was stressed to the patient as a preventative measure to decrease the frequency and severity of future pain episodes.  3.  Continue pain medicines as already prescribed.  If patient not currently taking any, it is recommended to begin Acetaminophen 1000 mg po q 8 hours.  If other medicines containing Acetaminophen are currently prescribed, maintain daily dose at 3000 mg.    4.  If they can tolerate NSAIDS, it is recommended to take Ibuprofen 600 mg po q 6 hours for 7 days during pain exacerbations.  Alternatively, they may substitute an NSAID of their choice (e.g. Aleve).  This may be taken at the same time as Acetaminophen.  5.  Heat and ice to the affected area as tolerated for pain control.  It was discussed that heating pads can cause burns.  6.  Daily low impact exercise such as walking or water exercise was recommended to maintain overall health and aid in weight control.   7.   Follow up as needed for subsequent injections.  8.  Patient was counseled to abstain from tobacco products.    Diagnosis     * Sacroiliitis (HCC) [M46.1]

## 2023-08-11 DIAGNOSIS — Z00.00 ROUTINE ADULT HEALTH MAINTENANCE: ICD-10-CM

## 2023-08-11 DIAGNOSIS — K22.70 BARRETT'S ESOPHAGUS DETERMINED BY BIOPSY: ICD-10-CM

## 2023-08-11 DIAGNOSIS — Z00.00 ANNUAL PHYSICAL EXAM: Primary | ICD-10-CM

## 2023-08-11 DIAGNOSIS — K90.0 CELIAC DISEASE: ICD-10-CM

## 2023-08-11 DIAGNOSIS — E78.2 MODERATE MIXED HYPERLIPIDEMIA NOT REQUIRING STATIN THERAPY: ICD-10-CM

## 2023-08-15 ENCOUNTER — OFFICE VISIT (OUTPATIENT)
Dept: FAMILY MEDICINE CLINIC | Facility: CLINIC | Age: 63
End: 2023-08-15
Payer: COMMERCIAL

## 2023-08-15 VITALS
BODY MASS INDEX: 24.53 KG/M2 | WEIGHT: 143.7 LBS | SYSTOLIC BLOOD PRESSURE: 120 MMHG | HEART RATE: 70 BPM | HEIGHT: 64 IN | DIASTOLIC BLOOD PRESSURE: 82 MMHG | TEMPERATURE: 97.1 F | OXYGEN SATURATION: 98 %

## 2023-08-15 DIAGNOSIS — Z00.00 HEALTH CARE MAINTENANCE: Primary | ICD-10-CM

## 2023-08-15 DIAGNOSIS — G43.909 MIGRAINE WITHOUT STATUS MIGRAINOSUS, NOT INTRACTABLE, UNSPECIFIED MIGRAINE TYPE: ICD-10-CM

## 2023-08-15 DIAGNOSIS — E78.2 MIXED HYPERLIPIDEMIA: ICD-10-CM

## 2023-08-15 DIAGNOSIS — Z51.81 ENCOUNTER FOR MONITORING STATIN THERAPY: ICD-10-CM

## 2023-08-15 DIAGNOSIS — Z79.899 ENCOUNTER FOR MONITORING STATIN THERAPY: ICD-10-CM

## 2023-08-15 PROBLEM — Z12.11 ENCOUNTER FOR SCREENING FOR MALIGNANT NEOPLASM OF COLON: Status: ACTIVE | Noted: 2023-08-15

## 2023-08-15 PROBLEM — K22.2 ESOPHAGEAL OBSTRUCTION: Status: ACTIVE | Noted: 2023-08-15

## 2023-08-15 PROBLEM — Z86.16 HISTORY OF 2019 NOVEL CORONAVIRUS DISEASE (COVID-19): Status: RESOLVED | Noted: 2021-08-06 | Resolved: 2023-08-15

## 2023-08-15 PROCEDURE — 99396 PREV VISIT EST AGE 40-64: CPT | Performed by: FAMILY MEDICINE

## 2023-08-15 RX ORDER — ATORVASTATIN CALCIUM 10 MG/1
10 TABLET, FILM COATED ORAL DAILY
Qty: 90 TABLET | Refills: 3 | Status: SHIPPED | OUTPATIENT
Start: 2023-08-15

## 2023-08-15 RX ORDER — SUMATRIPTAN 25 MG/1
25 TABLET, FILM COATED ORAL
Qty: 9 TABLET | Refills: 5 | Status: SHIPPED | OUTPATIENT
Start: 2023-08-15

## 2023-08-15 NOTE — PROGRESS NOTES
"  Chief Complaint   Patient presents with    Annual Exam       Subjective   Allyson Wilder is a 62 y.o. female and is here for a yearly physical exam. The patient reports problems - on going right upper back pain .    Do you take any herbs or supplements that were not prescribed by a doctor? yes. If so, these will be added to active medication list.    The following portions of the patient's history were reviewed and updated as appropriate: allergies, current medications, past family history, past medical history, past social history, past surgical history, and problem list.    Social and Family and Surgical History reviewed and updated today, see Rooming tab.    Health History, Preventive Measures and Vaccination flow sheets reviewed and updated today.    Patient's current medical chart in Epic; including previous office notes, Mychart messages, recent phone calls, imaging, labs, specialist's evaluation either in notes or in Media tab reviewed today.    Other outside pertinent medical information also reviewed thru Care Everywhere function is also reviewed today.    Review of Systems  Review of Systems  A comprehensive review of systems was negative.    Vitals:    08/15/23 0852   BP: 120/82   Pulse: 70   Temp: 97.1 øF (36.2 øC)   SpO2: 98%   Weight: 67.6 kg (149 lb)   Height: 162.6 cm (64.02\")     Body mass index is 25.56 kg/mý.      General Appearance:  Alert, cooperative, no distress, appears stated age   Head:  Normocephalic, without obvious abnormality, atraumatic   Eyes:  PERRL, conjunctiva/corneas clear, EOM's intact.   Ears:  Normal TM's and external ear canals, both ears   Nose: Nares normal, septum midline, mucosa normal, no drainage or sinus tenderness   Throat: Lips, mucosa, and tongue normal; teeth and gums normal   Neck: Supple, symmetrical, trachea midline, no adenopathy;   thyroid: No enlargement/tenderness/nodules; no carotid  bruit   Back:  Symmetric, no curvature, ROM normal, no CVA tenderness "   Lungs:  Clear to auscultation bilaterally, respirations unlabored   Chest wall:  No tenderness or deformity   Heart:  Regular rate and rhythm, S1 and S2 normal, no murmur, rub or gallop   Abdomen:  Soft, non-tender, bowel sounds active all four quadrants,   no masses, no organomegaly   Rectal:        Extremities: Extremities normal, atraumatic, no cyanosis or edema   Pulses: 2+ and symmetric all extremities   Skin: Skin color, texture, turgor normal, no rashes or lesions   Lymph nodes: Cervical, supraclavicular, and axillary nodes normal   Neurologic: CNII-XII intact. Normal strength, sensation.          Results for orders placed or performed in visit on 08/08/23   CBC (No Diff)    Specimen: Blood   Result Value Ref Range    WBC 4.9 3.4 - 10.8 x10E3/uL    RBC 4.46 3.77 - 5.28 x10E6/uL    Hemoglobin 14.6 11.1 - 15.9 g/dL    Hematocrit 43.4 34.0 - 46.6 %    MCV 97 79 - 97 fL    MCH 32.7 26.6 - 33.0 pg    MCHC 33.6 31.5 - 35.7 g/dL    RDW 11.8 11.7 - 15.4 %    Platelets 285 150 - 450 x10E3/uL   Comprehensive Metabolic Panel    Specimen: Blood   Result Value Ref Range    Glucose 84 70 - 99 mg/dL    BUN 14 8 - 27 mg/dL    Creatinine 0.81 0.57 - 1.00 mg/dL    EGFR Result 82 >59 mL/min/1.73    BUN/Creatinine Ratio 17 12 - 28    Sodium 139 134 - 144 mmol/L    Potassium 4.2 3.5 - 5.2 mmol/L    Chloride 101 96 - 106 mmol/L    Total CO2 21 20 - 29 mmol/L    Calcium 9.5 8.7 - 10.3 mg/dL    Total Protein 6.4 6.0 - 8.5 g/dL    Albumin 4.3 3.9 - 4.9 g/dL    Globulin 2.1 1.5 - 4.5 g/dL    A/G Ratio 2.0 1.2 - 2.2    Total Bilirubin 0.4 0.0 - 1.2 mg/dL    Alkaline Phosphatase 51 44 - 121 IU/L    AST (SGOT) 21 0 - 40 IU/L    ALT (SGPT) 13 0 - 32 IU/L   Lipid Panel    Specimen: Blood   Result Value Ref Range    Total Cholesterol 254 (H) 100 - 199 mg/dL    Triglycerides 86 0 - 149 mg/dL    HDL Cholesterol 90 >39 mg/dL    VLDL Cholesterol Jairo 14 5 - 40 mg/dL    LDL Chol Calc ([facility]) 150 (H) 0 - 99 mg/dL   TSH    Specimen: Blood   Result  Value Ref Range    TSH 1.800 0.450 - 4.500 uIU/mL   Urinalysis With Microscopic If Indicated (No Culture) - Urine, Clean Catch    Specimen: Urine, Clean Catch   Result Value Ref Range    Specific Gravity, UA CANCELED     pH, UA CANCELED     Protein CANCELED     Glucose, UA CANCELED     Ketones CANCELED    Unable To Void   Result Value Ref Range    Unable to Void Comment    Specimen Status Report   Result Value Ref Range    Specimen Status CANCELED      Assessment & Plan   Healthy female exam.  Diagnoses and all orders for this visit:    1. Health care maintenance (Primary)    2. Migraine without status migrainosus, not intractable, unspecified migraine type  -     SUMAtriptan (IMITREX) 25 MG tablet; Take 1 tablet by mouth Every 2 (Two) Hours As Needed for Migraine. Take one tablet at onset of headache. May repeat dose one time in 2 hours if headache not relieved.  Dispense: 9 tablet; Refill: 5    3. Mixed hyperlipidemia  -     atorvastatin (Lipitor) 10 MG tablet; Take 1 tablet by mouth Daily. Indications: High Amount of Fats in the Blood  Dispense: 90 tablet; Refill: 3  -     Lipid Panel; Future    4. Encounter for monitoring statin therapy  -     ALT; Future      1. Chol still too high, start Lipitor  2. Patient Counseling:  --Nutrition: Stressed importance of moderation in: sodium, caffeine, , saturated fat and cholesterol.  Discussed: caloric balance, sufficient intake of fresh fruits, vegetables, fiber, calcium, iron.  --Exercise: Stressed the importance of regular exercise.   --Substance Abuse: Discussed cessation and or reduction of tobacco, alcohol, or other drug use; driving or other dangerous activities under the influence.    --Dental health: Discussed importance of regular tooth brushing, flossing, and dental visits.  --Suggested having eyes and vision checked if needed or past due.  --Immunizations reviewed and given if needed.  --Discussed benefits of screening colonoscopy and or ColoGuard.  3.  Discussed the patient's BMI with her.  The BMI is in the acceptable range  4. Follow up next physical in 1 year    There are no Patient Instructions on file for this visit.    Medications Discontinued During This Encounter   Medication Reason    SUMAtriptan (IMITREX) 25 MG tablet Reorder        Dr. Dav Palacios MD  [place], [place]  [facility]

## 2024-02-05 ENCOUNTER — TELEPHONE (OUTPATIENT)
Dept: FAMILY MEDICINE CLINIC | Facility: CLINIC | Age: 64
End: 2024-02-05

## 2024-02-05 NOTE — TELEPHONE ENCOUNTER
PATIENT CALLED TO GET HER LAB ORDERS FROM 11/9/23  SHE WAS GIVEN WRITTEN LAB ORDERS BUT LOST IT. SHE IS WANTING TO COME TO THE OFFICE AND HAVE LABS DONE.     PLEASE CALL [phone]

## 2024-02-06 ENCOUNTER — TELEPHONE (OUTPATIENT)
Dept: FAMILY MEDICINE CLINIC | Facility: CLINIC | Age: 64
End: 2024-02-06
Payer: COMMERCIAL

## 2024-02-06 DIAGNOSIS — E78.2 MIXED HYPERLIPIDEMIA: Primary | ICD-10-CM

## 2024-02-06 NOTE — TELEPHONE ENCOUNTER
Patient is coming in for labs on 02/07/24, however she does not have an appointment to follow up.  What labs would you like for her to have?    She will need a lipid profile and liver function studies as we started Lipitor last visit  To test.  So she can come in tomorrow

## 2024-02-08 ENCOUNTER — TELEPHONE (OUTPATIENT)
Dept: FAMILY MEDICINE CLINIC | Facility: CLINIC | Age: 64
End: 2024-02-08

## 2024-02-08 NOTE — TELEPHONE ENCOUNTER
Caller: Allyson Wilder    Relationship: Self    Best call back number:     What is the best time to reach you:     Who are you requesting to speak with (clinical staff, provider,  specific staff member):     Do you know the name of the person who called:     What was the call regarding: PATIENT SAYS SHE RECEIVED HER LAB RESULTS VIA Co[employer]arrative ON 02/07/24 AND SHE WANTS TO BE CALLED AS SHE NEEDS TO KNOW IF THERE WILL BE A CHANGE IN HER MEDICATIONS.     Is it okay if the provider responds through [employer]:

## 2024-04-11 ENCOUNTER — TELEPHONE (OUTPATIENT)
Dept: NEUROSURGERY | Facility: CLINIC | Age: 64
End: 2024-04-11

## 2024-04-15 ENCOUNTER — ANESTHESIA (OUTPATIENT)
Dept: PAIN MEDICINE | Facility: HOSPITAL | Age: 64
End: 2024-04-15
Payer: COMMERCIAL

## 2024-04-15 ENCOUNTER — HOSPITAL ENCOUNTER (OUTPATIENT)
Dept: PAIN MEDICINE | Facility: HOSPITAL | Age: 64
Discharge: HOME | End: 2024-04-15
Payer: COMMERCIAL

## 2024-04-15 ENCOUNTER — ANESTHESIA EVENT (OUTPATIENT)
Dept: PAIN MEDICINE | Facility: HOSPITAL | Age: 64
End: 2024-04-15
Payer: COMMERCIAL

## 2024-04-15 ENCOUNTER — HOSPITAL ENCOUNTER (OUTPATIENT)
Dept: GENERAL RADIOLOGY | Facility: HOSPITAL | Age: 64
Discharge: HOME | End: 2024-04-15
Payer: COMMERCIAL

## 2024-04-15 VITALS
HEART RATE: 70 BPM | OXYGEN SATURATION: 100 % | SYSTOLIC BLOOD PRESSURE: 161 MMHG | TEMPERATURE: 97.7 F | RESPIRATION RATE: 16 BRPM | DIASTOLIC BLOOD PRESSURE: 79 MMHG

## 2024-04-15 DIAGNOSIS — M46.1 SI (SACROILIAC) JOINT INFLAMMATION: Primary | ICD-10-CM

## 2024-04-15 DIAGNOSIS — R52 PAIN: ICD-10-CM

## 2024-04-15 PROCEDURE — 25010000002 METHYLPREDNISOLONE PER 80 MG: Performed by: STUDENT IN AN ORGANIZED HEALTH CARE EDUCATION/TRAINING PROGRAM

## 2024-04-15 PROCEDURE — 25010000002 BUPIVACAINE (PF) 0.25 % SOLUTION: Performed by: STUDENT IN AN ORGANIZED HEALTH CARE EDUCATION/TRAINING PROGRAM

## 2024-04-15 RX ORDER — METHYLPREDNISOLONE ACETATE 80 MG/ML
80 INJECTION, SUSPENSION INTRA-ARTICULAR; INTRALESIONAL; INTRAMUSCULAR; SOFT TISSUE ONCE
Status: COMPLETED | OUTPATIENT
Start: 2024-04-15 | End: 2024-04-15

## 2024-04-15 RX ORDER — MIDAZOLAM HYDROCHLORIDE 1 MG/ML
1 INJECTION INTRAMUSCULAR; INTRAVENOUS AS NEEDED
Status: DISCONTINUED | OUTPATIENT
Start: 2024-04-15 | End: 2024-04-16 | Stop reason: HOSPADM

## 2024-04-15 RX ORDER — BUPIVACAINE HYDROCHLORIDE 2.5 MG/ML
10 INJECTION, SOLUTION EPIDURAL; INFILTRATION; INTRACAUDAL ONCE
Status: COMPLETED | OUTPATIENT
Start: 2024-04-15 | End: 2024-04-15

## 2024-04-15 RX ORDER — LIDOCAINE HYDROCHLORIDE 10 MG/ML
1 INJECTION, SOLUTION INFILTRATION; PERINEURAL ONCE
Status: DISCONTINUED | OUTPATIENT
Start: 2024-04-15 | End: 2024-04-16 | Stop reason: HOSPADM

## 2024-04-15 RX ADMIN — BUPIVACAINE HYDROCHLORIDE 10 ML: 2.5 INJECTION, SOLUTION EPIDURAL; INFILTRATION; INTRACAUDAL; PERINEURAL at 08:29

## 2024-04-15 RX ADMIN — METHYLPREDNISOLONE ACETATE 80 MG: 80 INJECTION, SUSPENSION INTRA-ARTICULAR; INTRALESIONAL; INTRAMUSCULAR; SOFT TISSUE at 08:29

## 2024-04-15 NOTE — THERAPY TREATMENT NOTE
Outpatient Physical Therapy Ortho Treatment Note  SANDRA Hanks     Patient Name: Allyson Wilder  : 1960  MRN: [id number]  Today's Date: 2020      Visit Date: 2020    Visit Dx:    ICD-10-CM ICD-9-CM   1. Trochanteric bursitis of both hips M70.61 726.5    M70.62        Patient Active Problem List   Diagnosis   • Bursitis of hip   • Narrowing of intervertebral disc space   • Elevated hemoglobin A1c   • Headache   • Menopausal flushing   • Insomnia   • Migraine   • Celiac disease   • Health care maintenance   • Hyperglycemia   • Lymphocytic colitis   • Du's esophagus determined by biopsy   • Postmenopausal HRT (hormone replacement therapy)   • Trochanteric bursitis of both hips   • SI (sacroiliac) joint inflammation (CMS/HCC)   • Back pain with left-sided sciatica   • Cellulitis of lower back        Past Medical History:   Diagnosis Date   • Bursitis of hip    • Celiac disease    • Heart murmur     FUNCTIONAL   • Insomnia    • Migraine    • Postmenopausal HRT (hormone replacement therapy)         Past Surgical History:   Procedure Laterality Date   • COLONOSCOPY     • ESOPHAGOSCOPY / EGD         PT Ortho     Row Name 20 09       Subjective Comments    Subjective Comments  Pt ststes her back at times is a 0/10 with pain and at times has been 9/10 with pain.  -      User Key  (r) = Recorded By, (t) = Taken By, (c) = Cosigned By    Initials Name Provider Type    Filiberto Caldera, PT Physical Therapist                      PT Assessment/Plan     Row Name 20 09          PT Assessment    Assessment Comments  Pt is doing well with decreasing c/o pain and improving function.  -        PT Plan    PT Plan Comments  Pt is to continue her HEP daily.  -       User Key  (r) = Recorded By, (t) = Taken By, (c) = Cosigned By    Initials Name Provider Type    Filiberto Caldera, PT Physical Therapist          Modalities     Row Name 20 09             Moist Heat    MH Applied  Yes   -GC      Location  right SI area with IFC with pt prone over one pillow  -GC      Rx Minutes  15 mins  -GC      MH Prior to Rx  Yes  -GC         ELECTRICAL STIMULATION    Attended/Unattended  Unattended  -GC      Stimulation Type  IFC  -GC      Location/Electrode Placement/Other  right SI area with MH with pt prone over 1 pillow  -GC       PT E-Stim Unattended (Manual) Minutes  15  -GC        User Key  (r) = Recorded By, (t) = Taken By, (c) = Cosigned By    Initials Name Provider Type     Filiberto Schultz PT Physical Therapist        OP Exercises     Row Name 05/22/20 0900             Subjective Comments    Subjective Comments  Pt ststes her back at times is a 0/10 with pain and at times has been 9/10 with pain.  -GC         Exercise 1    Exercise Name 1  Hamstring stretch with ADD-bilateral  -GC      Cueing 1  Verbal;Tactile  -GC      Reps 1  15  -GC      Time 1  10 secs  -GC         Exercise 2    Exercise Name 2  Piriformis stretch-bilateral  -GC      Cueing 2  Verbal;Tactile  -GC      Reps 2  15  -GC      Time 2  10 secs  -GC         Exercise 3    Exercise Name 3  MET for FRS left at L5/S1 and right on left posterior sacral torsion  -GC      Cueing 3  Verbal;Tactile  -GC      Time 3  3 min  -GC         Exercise 4    Exercise Name 4  Unilateral press up on right   -GC      Cueing 4  Verbal;Demo  -GC      Reps 4  15  -GC         Exercise 5    Exercise Name 5  Prone hip EXT-alternately  -GC      Cueing 5  Verbal;Tactile  -GC      Reps 5  40  -GC         Exercise 6    Exercise Name 6  Prone chest raise  -GC      Cueing 6  Verbal;Tactile  -GC      Reps 6  20  -GC        User Key  (r) = Recorded By, (t) = Taken By, (c) = Cosigned By    Initials Name Provider Type     Filiberto Schultz PT Physical Therapist                                          Time Calculation:   Start Time: 0900  Stop Time: 0946  Time Calculation (min): 46 min  Therapy Charges for Today     Code Description Service Date Service Provider  Modifiers Qty    89085214165 HC PT ELECTRICAL STIM UNATTENDED 5/22/2020 Filiberto Schultz, PT  1    81735363016 HC PT THER PROC EA 15 MIN 5/22/2020 Filiberto Schultz, PT GP 1                    Filiberto Schultz, PT  5/22/2020      2 seconds or less

## 2024-04-15 NOTE — H&P
CHIEF COMPLAINT:   Bilateral buttock pain    HISTORY OF PRESENT ILLNESS:    The patient is a 63 y.o. female who presents today with complaints of chronic bilateral buttock pain that has been present for multiple years.  Their pain is described as an intermittent deep pain, aching, sometimes sharp and stabbing.  Occasionally the pain will radiate down the leg into the left foot.  Today their pain is a 8 out of 10   The pain limits the patient's activities of daily living including: Quality sleep and exercise.  The pain was significantly improved after an SI joint injection over a year ago.  It also improves with yoga and stretching    The conservative measures the patient has attempted recently without significant improvement include: Rest, ice, heat, OTC medications, exercise/stretches.     PAST MEDICAL HISTORY:  Current Outpatient Medications on File Prior to Encounter   Medication Sig Dispense Refill    atorvastatin (Lipitor) 10 MG tablet Take 1 tablet by mouth Daily. Indications: High Amount of Fats in the Blood 90 tablet 3    finasteride (PROSCAR) 5 MG tablet Take 1 tablet by mouth Daily.      mesalamine (LIALDA) 1.2 G EC tablet Take 1 tablet by mouth Daily.      MINIVELLE 0.0375 MG/24HR Place 1 patch on the skin as directed by provider 2 (Two) Times a Week.      progesterone (PROMETRIUM) 100 MG capsule Take 1 capsule by mouth Daily.      SUMAtriptan (IMITREX) 25 MG tablet Take 1 tablet by mouth Every 2 (Two) Hours As Needed for Migraine. Take one tablet at onset of headache. May repeat dose one time in 2 hours if headache not relieved. 9 tablet 5     No current facility-administered medications on file prior to encounter.       Past Medical History:   Diagnosis Date    Heart murmur     FUNCTIONAL    History of 2019 novel coronavirus disease (COVID-19) 08/06/2021    Synovial cyst of lumbar spine          SOCIAL HISTORY:  No current tobacco    REVIEW OF SYSTEMS:  No hematologic infectious or constitutional  symptoms  Other review of systems non-contributory  Negative screen for CAMILLE      PHYSICAL EXAM:  /91 (BP Location: Left arm, Patient Position: Sitting)   Pulse 72   Temp 36.5 °C (97.7 °F) (Oral)   Resp 16   SpO2 98%   Well-developed well-nourished no acute distress  Extra ocular movements intact  Mallampati class 2 airway  Alert and oriented ×3  Deep tendon reflexes normal in the bilateral patella  5 out of 5 strength bilateral lower extremities  Negative straight leg raise bilaterally  Positive bilateral SI joint tenderness  Positive bilateral Parmjit maneuver  Positive compression test  Positive distraction test  Negative sacral thrust    DIAGNOSIS:  Post-Op Diagnosis Codes:     * Sacroiliitis [M46.1]    PLAN:  1.  Bilateral sacroiliac joint steroid injections, up to 3. If pain control is acceptable after 1 or 2 injections, it was discussed with the patient that they may return for the subsequent injections if and when their pain returns.  The risks were discussed with the patient including failure of relief, worsening pain, bleeding, infection, injury.  2.  Physical therapy exercises at home as prescribed by physical therapy or from the pain clinic handout.  Continuation of these exercises every day, or multiple times per week, even when the patient has good pain relief, was stressed to the patient as a preventative measure to decrease the frequency and severity of future pain episodes.  3.  Continue pain medicines as already prescribed.  If patient not currently taking any, it is recommended to begin Acetaminophen 1000 mg po q 8 hours.  If other medicines containing Acetaminophen are currently prescribed, maintain daily dose at 3000 mg.    4.  If they can tolerate NSAIDS, it is recommended to take Ibuprofen 600 mg po q 6 hours for 7 days during pain exacerbations.  Alternatively, they may substitute an NSAID of their choice (e.g. Aleve).  This may be taken at the same time as Acetaminophen.  5.  Heat and  ice to the affected area as tolerated for pain control.    6.  Daily low impact exercise such as walking or water exercise was recommended to maintain overall health and aid in weight control.   7.  Follow up as needed for subsequent injections.

## 2024-04-15 NOTE — ANESTHESIA PROCEDURE NOTES
PAIN SI joint injection      Patient reassessed immediately prior to procedure    Patient location during procedure: Pain Clinic    Reason for block: procedure for pain  Performed by  Anesthesiologist: Cary Sales MD  Preanesthetic Checklist  Completed: patient identified, IV checked, site marked, risks and benefits discussed, surgical consent, monitors and equipment checked, pre-op evaluation and timeout performed  Prep:  Patient position: prone  Sterile barriers:cap, gloves, mask and sterile barrier  Prep: ChloraPrep  Patient monitoring: blood pressure monitoring and continuous pulse oximetry  Procedure:  Sedation:no  Guidance:fluoroscopy  Contrast Medium:no  Location:Bilateral  Needle Type:Quincke  Needle Gauge:22 G  Aspiration:negative  Medications:  Depomedrol:80 mg  Comment:0.25% Bupivacaine 4 ml    Depot Medrol and bupivacaine was split equally to both injection sites.    Post Assessment  Injection Assessment: negative  Patient Tolerance:comfortable throughout block  Complications:no  Additional Notes  Diagnosis: Post-Op Diagnosis Codes:     * Sacroiliitis [M46.1]     Sedation:  none     Sedation time:     Under fluoroscopic guidance and after ChloraPrep, the SI joint was accessed and injected with the above medications.

## 2024-07-05 ENCOUNTER — OFFICE VISIT (OUTPATIENT)
Dept: FAMILY MEDICINE CLINIC | Facility: CLINIC | Age: 64
End: 2024-07-05
Payer: COMMERCIAL

## 2024-07-05 VITALS
SYSTOLIC BLOOD PRESSURE: 120 MMHG | DIASTOLIC BLOOD PRESSURE: 82 MMHG | HEART RATE: 67 BPM | HEIGHT: 64 IN | BODY MASS INDEX: 24.41 KG/M2 | WEIGHT: 143 LBS | OXYGEN SATURATION: 99 % | TEMPERATURE: 97.1 F

## 2024-07-05 DIAGNOSIS — M79.10 MYALGIA DUE TO STATIN: Primary | ICD-10-CM

## 2024-07-05 DIAGNOSIS — T46.6X5A MYALGIA DUE TO STATIN: Primary | ICD-10-CM

## 2024-07-05 DIAGNOSIS — R53.83 OTHER FATIGUE: ICD-10-CM

## 2024-07-05 LAB
ALBUMIN SERPL-MCNC: 4.4 G/DL (ref 3.5–5.2)
ALBUMIN/GLOB SERPL: 2.6 G/DL
ALP SERPL-CCNC: 52 U/L (ref 39–117)
ALT SERPL-CCNC: 15 U/L (ref 1–33)
AST SERPL-CCNC: 17 U/L (ref 1–32)
BASOPHILS # BLD AUTO: 0.06 10*3/MM3 (ref 0–0.2)
BASOPHILS NFR BLD AUTO: 1.1 % (ref 0–1.5)
BILIRUB SERPL-MCNC: 0.5 MG/DL (ref 0–1.2)
BUN SERPL-MCNC: 13 MG/DL (ref 8–23)
BUN/CREAT SERPL: 17.1 (ref 7–25)
CALCIUM SERPL-MCNC: 9.4 MG/DL (ref 8.6–10.5)
CHLORIDE SERPL-SCNC: 104 MMOL/L (ref 98–107)
CK SERPL-CCNC: 532 U/L (ref 20–180)
CO2 SERPL-SCNC: 24.8 MMOL/L (ref 22–29)
CREAT SERPL-MCNC: 0.76 MG/DL (ref 0.57–1)
CRP SERPL-MCNC: <0.3 MG/DL (ref 0–0.5)
EGFRCR SERPLBLD CKD-EPI 2021: 87.6 ML/MIN/1.73
EOSINOPHIL # BLD AUTO: 0.34 10*3/MM3 (ref 0–0.4)
EOSINOPHIL NFR BLD AUTO: 6.4 % (ref 0.3–6.2)
ERYTHROCYTE [DISTWIDTH] IN BLOOD BY AUTOMATED COUNT: 12.3 % (ref 12.3–15.4)
ERYTHROCYTE [SEDIMENTATION RATE] IN BLOOD BY WESTERGREN METHOD: <1 MM/HR (ref 0–30)
GLOBULIN SER CALC-MCNC: 1.7 GM/DL
GLUCOSE SERPL-MCNC: 93 MG/DL (ref 65–99)
HCT VFR BLD AUTO: 43.8 % (ref 34–46.6)
HGB BLD-MCNC: 14.5 G/DL (ref 12–15.9)
IMM GRANULOCYTES # BLD AUTO: 0.03 10*3/MM3 (ref 0–0.05)
IMM GRANULOCYTES NFR BLD AUTO: 0.6 % (ref 0–0.5)
LYMPHOCYTES # BLD AUTO: 1.59 10*3/MM3 (ref 0.7–3.1)
LYMPHOCYTES NFR BLD AUTO: 29.8 % (ref 19.6–45.3)
MCH RBC QN AUTO: 32.3 PG (ref 26.6–33)
MCHC RBC AUTO-ENTMCNC: 33.1 G/DL (ref 31.5–35.7)
MCV RBC AUTO: 97.6 FL (ref 79–97)
MONOCYTES # BLD AUTO: 0.59 10*3/MM3 (ref 0.1–0.9)
MONOCYTES NFR BLD AUTO: 11 % (ref 5–12)
NEUTROPHILS # BLD AUTO: 2.73 10*3/MM3 (ref 1.7–7)
NEUTROPHILS NFR BLD AUTO: 51.1 % (ref 42.7–76)
NRBC BLD AUTO-RTO: 0 /100 WBC (ref 0–0.2)
PLATELET # BLD AUTO: 277 10*3/MM3 (ref 140–450)
POTASSIUM SERPL-SCNC: 4.7 MMOL/L (ref 3.5–5.2)
PROT SERPL-MCNC: 6.1 G/DL (ref 6–8.5)
RBC # BLD AUTO: 4.49 10*6/MM3 (ref 3.77–5.28)
SODIUM SERPL-SCNC: 139 MMOL/L (ref 136–145)
WBC # BLD AUTO: 5.34 10*3/MM3 (ref 3.4–10.8)

## 2024-07-05 PROCEDURE — 99214 OFFICE O/P EST MOD 30 MIN: CPT | Performed by: FAMILY MEDICINE

## 2024-07-05 NOTE — PROGRESS NOTES
"  Subjective   Allyson Wilder is a 63 y.o. female who is here for   Chief Complaint   Patient presents with    Fatigue    Joint Pain     All over pain. Unsure if its muscle or joint x few months.    .     History of Present Illness     Worsening muscle pain fatigue achiness for several months.  No fever or rash.  Joints not swollen or red  Send autoimmune disease with celiac  He has been quite diligent and strict with her no wheat diet  No GI complaints  Did have COVID a few months ago, but she explains the fatigue and muscle aches were present prior to that infection.  She remains quite active with daily exercise    We did start a new statin drug will on her back in August.  Might be the culprit, Lipitor.    She did have an accidental fall a few months ago fell and broke her nose.  Tripped over her nightgown taking care of her new puppy    The following portions of the patient's history were reviewed and updated as appropriate: allergies, current medications, past family history, past medical history, past social history, past surgical history, and problem list.    Review of Systems    Objective   Vitals:    07/05/24 0749   BP: 120/82   BP Location: Left arm   Patient Position: Sitting   Cuff Size: Adult   Pulse: 67   Temp: 97.1 °F (36.2 °C)   SpO2: 99%   Weight: 64.9 kg (143 lb)   Height: 162.6 cm (64.02\")      Physical Exam  Vitals reviewed.   Cardiovascular:      Rate and Rhythm: Normal rate.   Musculoskeletal:         General: No swelling. Normal range of motion.   Skin:     Findings: No rash.   Neurological:      Mental Status: She is alert.         Assessment & Plan   Diagnoses and all orders for this visit:    1. Myalgia due to statin (Primary)  -     CBC & Differential  -     Comprehensive Metabolic Panel  -     CK  -     Sedimentation Rate  -     C-reactive Protein    2. Other fatigue  -     CBC & Differential  -     Comprehensive Metabolic Panel  -     CK  -     Sedimentation Rate  -     C-reactive " Protein    Stop Lipitor  Check labs today    There are no Patient Instructions on file for this visit.    There are no discontinued medications.     Return for Next scheduled follow up.    Dr. Dav Palacios  [place], [place]

## 2024-07-12 ENCOUNTER — TELEPHONE (OUTPATIENT)
Dept: FAMILY MEDICINE CLINIC | Facility: CLINIC | Age: 64
End: 2024-07-12

## 2024-07-12 DIAGNOSIS — E78.2 MIXED HYPERLIPIDEMIA: Primary | ICD-10-CM

## 2024-07-12 DIAGNOSIS — M79.10 MYALGIA DUE TO STATIN: ICD-10-CM

## 2024-07-12 DIAGNOSIS — T46.6X5A MYALGIA DUE TO STATIN: ICD-10-CM

## 2024-07-12 NOTE — TELEPHONE ENCOUNTER
Spoke with patient. She will be out the month of September. She is scheduled for fasting labs on 8/30/24.

## 2024-07-12 NOTE — TELEPHONE ENCOUNTER
Patient is wanting to repeat labs since there were med changes. When should patient return for repeat labs? She was last seen on 7/5/24.    1 September    Will order a repeat lipid panel and a repeat muscle CPK test

## 2024-07-12 NOTE — TELEPHONE ENCOUNTER
Caller: Allyson Wilder    Relationship: Self    Best call back number: [phone]    What is the best time to reach you: ANYTIME     Who are you requesting to speak with (clinical staff, provider,  specific staff member): CLINICAL     What was the call regarding: PATIENT WOULD LIKE A CALL BACK TO FOLLOW UP ON WHEN SHE SHOULD BE GETTING HER NEXT ROUND OF LABS AND A FOLLOW UP APPOINTMENT.     PATIENT STATES THAT SHE WOULD LIKE ANOTHER ROUND OF LABS DONE 2 WEEKS FROM HER LAST APPOINTMENT, TO ENSURE THAT THE CHANGES TO HER MEDICATIONS ARE HELPING.     PLEASE ADVISE

## 2024-08-09 VITALS
DIASTOLIC BLOOD PRESSURE: 85 MMHG | TEMPERATURE: 96.8 F | OXYGEN SATURATION: 94 % | DIASTOLIC BLOOD PRESSURE: 95 MMHG | DIASTOLIC BLOOD PRESSURE: 98 MMHG | HEART RATE: 70 BPM | SYSTOLIC BLOOD PRESSURE: 126 MMHG | HEART RATE: 69 BPM | SYSTOLIC BLOOD PRESSURE: 122 MMHG | HEART RATE: 72 BPM | WEIGHT: 140 LBS | DIASTOLIC BLOOD PRESSURE: 100 MMHG | DIASTOLIC BLOOD PRESSURE: 83 MMHG | OXYGEN SATURATION: 100 % | RESPIRATION RATE: 22 BRPM | RESPIRATION RATE: 20 BRPM | SYSTOLIC BLOOD PRESSURE: 173 MMHG | HEART RATE: 61 BPM | RESPIRATION RATE: 16 BRPM | DIASTOLIC BLOOD PRESSURE: 102 MMHG | SYSTOLIC BLOOD PRESSURE: 146 MMHG | HEART RATE: 67 BPM | HEIGHT: 64 IN | SYSTOLIC BLOOD PRESSURE: 135 MMHG | OXYGEN SATURATION: 99 % | DIASTOLIC BLOOD PRESSURE: 75 MMHG | HEART RATE: 65 BPM | SYSTOLIC BLOOD PRESSURE: 121 MMHG | OXYGEN SATURATION: 98 % | HEART RATE: 82 BPM | DIASTOLIC BLOOD PRESSURE: 87 MMHG | RESPIRATION RATE: 18 BRPM | SYSTOLIC BLOOD PRESSURE: 149 MMHG | HEART RATE: 64 BPM | TEMPERATURE: 97.7 F | OXYGEN SATURATION: 97 % | RESPIRATION RATE: 17 BRPM | RESPIRATION RATE: 12 BRPM

## 2024-08-14 ENCOUNTER — OFFICE (AMBULATORY)
Dept: URBAN - METROPOLITAN AREA PATHOLOGY 4 | Facility: PATHOLOGY | Age: 64
End: 2024-08-14

## 2024-08-14 ENCOUNTER — OFFICE (AMBULATORY)
Age: 64
End: 2024-08-14

## 2024-08-14 ENCOUNTER — AMBULATORY SURGICAL CENTER (AMBULATORY)
Dept: URBAN - METROPOLITAN AREA SURGERY 17 | Facility: SURGERY | Age: 64
End: 2024-08-14
Payer: COMMERCIAL

## 2024-08-14 DIAGNOSIS — K21.00 GASTRO-ESOPHAGEAL REFLUX DISEASE WITH ESOPHAGITIS, WITHOUT B: ICD-10-CM

## 2024-08-14 DIAGNOSIS — K22.2 ESOPHAGEAL OBSTRUCTION: ICD-10-CM

## 2024-08-14 DIAGNOSIS — R13.10 DYSPHAGIA, UNSPECIFIED: ICD-10-CM

## 2024-08-14 DIAGNOSIS — K44.9 DIAPHRAGMATIC HERNIA WITHOUT OBSTRUCTION OR GANGRENE: ICD-10-CM

## 2024-08-14 LAB
GI HISTOLOGY: A. GASTRO-ESOPHAGEAL JUNCTION: (no result)
GI HISTOLOGY: PDF REPORT: (no result)

## 2024-08-14 PROCEDURE — 88305 TISSUE EXAM BY PATHOLOGIST: CPT | Performed by: PATHOLOGY

## 2024-08-14 PROCEDURE — 43249 ESOPH EGD DILATION <30 MM: CPT | Performed by: INTERNAL MEDICINE

## 2024-08-14 PROCEDURE — 43239 EGD BIOPSY SINGLE/MULTIPLE: CPT | Mod: 59 | Performed by: INTERNAL MEDICINE

## 2024-08-27 DIAGNOSIS — E78.2 MIXED HYPERLIPIDEMIA: ICD-10-CM

## 2024-08-27 DIAGNOSIS — T46.6X5A MYALGIA DUE TO STATIN: ICD-10-CM

## 2024-08-27 DIAGNOSIS — M79.10 MYALGIA DUE TO STATIN: ICD-10-CM

## 2024-08-31 LAB
CHOLEST SERPL-MCNC: 245 MG/DL (ref 0–200)
CK SERPL-CCNC: 554 U/L (ref 20–180)
HDLC SERPL-MCNC: 78 MG/DL (ref 40–60)
LDLC SERPL CALC-MCNC: 155 MG/DL (ref 0–100)
TRIGL SERPL-MCNC: 70 MG/DL (ref 0–150)
VLDLC SERPL CALC-MCNC: 12 MG/DL (ref 5–40)

## 2024-10-01 ENCOUNTER — OFFICE VISIT (OUTPATIENT)
Dept: FAMILY MEDICINE CLINIC | Facility: CLINIC | Age: 64
End: 2024-10-01
Payer: MEDICARE

## 2024-10-01 VITALS
OXYGEN SATURATION: 97 % | HEART RATE: 74 BPM | WEIGHT: 148 LBS | SYSTOLIC BLOOD PRESSURE: 110 MMHG | BODY MASS INDEX: 25.27 KG/M2 | DIASTOLIC BLOOD PRESSURE: 60 MMHG | TEMPERATURE: 97.6 F | HEIGHT: 64 IN

## 2024-10-01 DIAGNOSIS — R74.8 ELEVATED CPK: Primary | ICD-10-CM

## 2024-10-01 PROCEDURE — 1159F MED LIST DOCD IN RCRD: CPT | Performed by: FAMILY MEDICINE

## 2024-10-01 PROCEDURE — 99213 OFFICE O/P EST LOW 20 MIN: CPT | Performed by: FAMILY MEDICINE

## 2024-10-01 PROCEDURE — 1126F AMNT PAIN NOTED NONE PRSNT: CPT | Performed by: FAMILY MEDICINE

## 2024-10-01 PROCEDURE — 1160F RVW MEDS BY RX/DR IN RCRD: CPT | Performed by: FAMILY MEDICINE

## 2024-10-01 RX ORDER — PREDNISONE 5 MG/1
5 TABLET ORAL DAILY
Qty: 14 TABLET | Refills: 0 | Status: SHIPPED | OUTPATIENT
Start: 2024-10-01

## 2024-10-01 NOTE — PROGRESS NOTES
"  Subjective   Allyson Wilder is a 64 y.o. female who is here for   Chief Complaint   Patient presents with    MYALGIA   .     History of Present Illness     Still with muscle aches in arms and legs  Has been off statin for over a month now  Had COVID infection this year  Labs reviewed  CRP and sed rate are normal  CPK levels are elevated  No specific joint complaints  No rash    The following portions of the patient's history were reviewed and updated as appropriate: allergies, current medications, past family history, past medical history, past social history, past surgical history, and problem list.    Review of Systems    Objective   Vitals:    10/01/24 1349   BP: 110/60   BP Location: Left arm   Patient Position: Sitting   Cuff Size: Adult   Pulse: 74   Temp: 97.6 °F (36.4 °C)   SpO2: 97%   Weight: 67.1 kg (148 lb)   Height: 162.6 cm (64.02\")      Physical Exam  Vitals reviewed.   Musculoskeletal:         General: Tenderness present. No swelling.   Skin:     Findings: No rash.   Neurological:      Mental Status: She is alert.         Results for orders placed or performed in visit on 08/27/24   CK    Specimen: Blood   Result Value Ref Range    Creatine Kinase 554 (H) 20 - 180 U/L   Lipid Panel    Specimen: Blood   Result Value Ref Range    Total Cholesterol 245 (H) 0 - 200 mg/dL    Triglycerides 70 0 - 150 mg/dL    HDL Cholesterol 78 (H) 40 - 60 mg/dL    VLDL Cholesterol Jairo 12 5 - 40 mg/dL    LDL Chol Calc (NIH) 155 (H) 0 - 100 mg/dL     CPK          8/30/2024    09:40   Common Labs   Creatine Kinase 554      CRP and sed rate levels are normal    Assessment & Plan   Diagnoses and all orders for this visit:    1. Elevated CPK (Primary)  -     predniSONE (DELTASONE) 5 MG tablet; Take 1 tablet by mouth Daily.  Dispense: 14 tablet; Refill: 0    Persistent myalgia  Perhaps prolonged COVID myalgias or statin myalgia  Try prednisone for 2 weeks    There are no Patient Instructions on file for this " visit.    Medications Discontinued During This Encounter   Medication Reason    atorvastatin (Lipitor) 10 MG tablet Side effects        No follow-ups on file.    Dr. Dav Palacios  [place], [place]

## 2024-10-03 ENCOUNTER — OFFICE (AMBULATORY)
Age: 64
End: 2024-10-03

## 2024-10-03 ENCOUNTER — OFFICE (AMBULATORY)
Dept: URBAN - METROPOLITAN AREA CLINIC 76 | Facility: CLINIC | Age: 64
End: 2024-10-03

## 2024-10-03 VITALS
DIASTOLIC BLOOD PRESSURE: 62 MMHG | OXYGEN SATURATION: 98 % | SYSTOLIC BLOOD PRESSURE: 110 MMHG | WEIGHT: 146 LBS | HEIGHT: 64 IN | DIASTOLIC BLOOD PRESSURE: 62 MMHG | HEART RATE: 61 BPM | OXYGEN SATURATION: 98 % | DIASTOLIC BLOOD PRESSURE: 62 MMHG | HEART RATE: 61 BPM | SYSTOLIC BLOOD PRESSURE: 110 MMHG | DIASTOLIC BLOOD PRESSURE: 62 MMHG | SYSTOLIC BLOOD PRESSURE: 110 MMHG | OXYGEN SATURATION: 98 % | OXYGEN SATURATION: 98 % | HEIGHT: 64 IN | DIASTOLIC BLOOD PRESSURE: 62 MMHG | DIASTOLIC BLOOD PRESSURE: 62 MMHG | DIASTOLIC BLOOD PRESSURE: 62 MMHG | SYSTOLIC BLOOD PRESSURE: 110 MMHG | HEART RATE: 61 BPM | HEART RATE: 61 BPM | HEART RATE: 61 BPM | WEIGHT: 146 LBS | HEIGHT: 64 IN | OXYGEN SATURATION: 98 % | HEIGHT: 64 IN | HEIGHT: 64 IN | HEIGHT: 64 IN | SYSTOLIC BLOOD PRESSURE: 110 MMHG | WEIGHT: 146 LBS | HEART RATE: 61 BPM | HEART RATE: 61 BPM | HEIGHT: 64 IN | WEIGHT: 146 LBS | SYSTOLIC BLOOD PRESSURE: 110 MMHG | OXYGEN SATURATION: 98 % | WEIGHT: 146 LBS | OXYGEN SATURATION: 98 % | SYSTOLIC BLOOD PRESSURE: 110 MMHG | WEIGHT: 146 LBS | WEIGHT: 146 LBS

## 2024-10-03 DIAGNOSIS — K22.2 ESOPHAGEAL OBSTRUCTION: ICD-10-CM

## 2024-10-03 DIAGNOSIS — K52.9 NONINFECTIVE GASTROENTERITIS AND COLITIS, UNSPECIFIED: ICD-10-CM

## 2024-10-03 DIAGNOSIS — R10.13 EPIGASTRIC PAIN: ICD-10-CM

## 2024-10-03 DIAGNOSIS — K22.70 BARRETT'S ESOPHAGUS WITHOUT DYSPLASIA: ICD-10-CM

## 2024-10-03 DIAGNOSIS — K44.9 DIAPHRAGMATIC HERNIA WITHOUT OBSTRUCTION OR GANGRENE: ICD-10-CM

## 2024-10-03 DIAGNOSIS — R10.84 GENERALIZED ABDOMINAL PAIN: ICD-10-CM

## 2024-10-03 DIAGNOSIS — R13.10 DYSPHAGIA, UNSPECIFIED: ICD-10-CM

## 2024-10-03 DIAGNOSIS — K29.70 GASTRITIS, UNSPECIFIED, WITHOUT BLEEDING: ICD-10-CM

## 2024-10-03 DIAGNOSIS — R93.3 ABNORMAL FINDINGS ON DIAGNOSTIC IMAGING OF OTHER PARTS OF DI: ICD-10-CM

## 2024-10-03 DIAGNOSIS — K90.0 CELIAC DISEASE: ICD-10-CM

## 2024-10-03 PROBLEM — K31.89 OTHER DISEASES OF STOMACH AND DUODENUM: Status: INACTIVE | Noted: 2022-10-10

## 2024-10-03 PROCEDURE — 99214 OFFICE O/P EST MOD 30 MIN: CPT | Performed by: INTERNAL MEDICINE

## 2024-10-09 ENCOUNTER — TELEPHONE (OUTPATIENT)
Dept: NEUROSURGERY | Facility: CLINIC | Age: 64
End: 2024-10-09

## 2024-10-09 NOTE — TELEPHONE ENCOUNTER
Caller: RAJ    Relationship: SELF    Best call back number: [phone]    What was the call regarding: PATIENT CALLED STATING THAT STARTING ABOUT 4 MONTHS AGO SHE STARTED HAVING SORENESS IN HER HIP AND IT HAS SPREAD THROUGH OUT HER BODY - HER PCP ORDERED A CREATINE TEST AND THE RESULTS  - PATIENT WOULD LIKE TO KNOW IF THIS IS SOMETHING THAT DR RODGERS CAN HELP HER WITH OR CAN HE RECOMMEND SOMEBODY FOR HER    PATIENT LAST SEEN BY DR RODGERS 03/28/23 FOR SI INFLAMMATION    PLEASE ADVISE  THANK YOU    Is it okay if the provider responds through MyChart: YES

## 2024-10-16 DIAGNOSIS — G43.909 MIGRAINE WITHOUT STATUS MIGRAINOSUS, NOT INTRACTABLE, UNSPECIFIED MIGRAINE TYPE: ICD-10-CM

## 2024-10-16 RX ORDER — SUMATRIPTAN 25 MG/1
TABLET, FILM COATED ORAL
Qty: 9 TABLET | Refills: 5 | Status: SHIPPED | OUTPATIENT
Start: 2024-10-16

## 2024-12-20 ENCOUNTER — TELEPHONE (OUTPATIENT)
Dept: FAMILY MEDICINE CLINIC | Facility: CLINIC | Age: 64
End: 2024-12-20

## 2024-12-20 DIAGNOSIS — M46.1 SI (SACROILIAC) JOINT INFLAMMATION: Primary | ICD-10-CM

## 2024-12-20 DIAGNOSIS — R74.8 ELEVATED CPK: ICD-10-CM

## 2024-12-20 NOTE — TELEPHONE ENCOUNTER
Tried to call patient.  No answer.      Okay,  I thought referral had already been made.  Also thought she had already been seeing rheumatology all along    I will go ahead and place a new referral for Dr. Hunt for rheumatology

## 2024-12-20 NOTE — TELEPHONE ENCOUNTER
Caller: Allyson Wilder    Relationship: Self    Best call back number: [phone]     What was the call regarding: PT WOULD LIKE A CALLBACK TO DISCUSS A RHEUMATOLOGY REFERRAL

## 2025-01-08 DIAGNOSIS — G43.909 MIGRAINE WITHOUT STATUS MIGRAINOSUS, NOT INTRACTABLE, UNSPECIFIED MIGRAINE TYPE: ICD-10-CM

## 2025-01-08 RX ORDER — SUMATRIPTAN SUCCINATE 25 MG/1
TABLET ORAL
Qty: 27 TABLET | Refills: 1 | Status: SHIPPED | OUTPATIENT
Start: 2025-01-08

## 2025-01-22 ENCOUNTER — TELEPHONE (OUTPATIENT)
Dept: FAMILY MEDICINE CLINIC | Facility: CLINIC | Age: 65
End: 2025-01-22

## 2025-01-22 DIAGNOSIS — R74.8 ELEVATED CPK: ICD-10-CM

## 2025-01-22 DIAGNOSIS — E78.2 MIXED HYPERLIPIDEMIA: Primary | ICD-10-CM

## 2025-01-22 NOTE — TELEPHONE ENCOUNTER
Patient saw Dr. Love recently and had labs done.  She will call and ask them to fax them over to Dr. Palacios to review.

## 2025-01-22 NOTE — TELEPHONE ENCOUNTER
They faxed over the orders and no results.  I called Dr. Love's office to have labs results faxed over.

## 2025-01-22 NOTE — TELEPHONE ENCOUNTER
Caller: Allyson Wilder    Relationship: Self    Best call back number:     What is the best time to reach you:     Who are you requesting to speak with (clinical staff, provider,  specific staff member):     Do you know the name of the person who called:     What was the call regarding: PATIENT WANTS TO KNOW IF DR LAIRD WANTS TO RESTART HER ON HER STATIN MEDICATION SHE WANTS TO BE CALLED TO DISCUSS.     Now that we have been off the statin medication for a few months  Please come into the lab for repeat muscle CPK check  And a lipid profile to see which her numbers are  If CPK levels have improved we can restart a low-dose statin  Depending on what the cholesterol numbers show

## 2025-01-23 NOTE — TELEPHONE ENCOUNTER
Patient called asking if Dr. Love's office has faxed any results to the office.   Patient is asking for a call back.   Please advise.

## 2025-01-24 NOTE — TELEPHONE ENCOUNTER
Received labs from Dr. Love's office.  There is no Lipid Panel and CPK was high.  Will recheck those labs on Monday 1/27/25.

## 2025-01-28 DIAGNOSIS — R74.8 ELEVATED CPK: Primary | ICD-10-CM

## 2025-01-29 ENCOUNTER — OFFICE VISIT (OUTPATIENT)
Dept: FAMILY MEDICINE CLINIC | Facility: CLINIC | Age: 65
End: 2025-01-29
Payer: MEDICARE

## 2025-01-29 VITALS
TEMPERATURE: 97.4 F | WEIGHT: 152 LBS | DIASTOLIC BLOOD PRESSURE: 64 MMHG | BODY MASS INDEX: 25.95 KG/M2 | HEART RATE: 80 BPM | OXYGEN SATURATION: 98 % | HEIGHT: 64 IN | SYSTOLIC BLOOD PRESSURE: 110 MMHG

## 2025-01-29 DIAGNOSIS — E78.2 MIXED HYPERLIPIDEMIA: Primary | ICD-10-CM

## 2025-01-29 DIAGNOSIS — R74.8 ELEVATED CPK: ICD-10-CM

## 2025-01-29 PROCEDURE — 1159F MED LIST DOCD IN RCRD: CPT | Performed by: FAMILY MEDICINE

## 2025-01-29 PROCEDURE — 99214 OFFICE O/P EST MOD 30 MIN: CPT | Performed by: FAMILY MEDICINE

## 2025-01-29 PROCEDURE — 1160F RVW MEDS BY RX/DR IN RCRD: CPT | Performed by: FAMILY MEDICINE

## 2025-01-29 PROCEDURE — 1126F AMNT PAIN NOTED NONE PRSNT: CPT | Performed by: FAMILY MEDICINE

## 2025-01-29 RX ORDER — EZETIMIBE 10 MG/1
10 TABLET ORAL DAILY
Qty: 90 TABLET | Refills: 1 | Status: SHIPPED | OUTPATIENT
Start: 2025-01-29

## 2025-01-29 NOTE — PROGRESS NOTES
"  Subjective   Allyson Wilder is a 64 y.o. female who is here for   Chief Complaint   Patient presents with    Results     Discuss lipid panel and elevated CK   .     History of Present Illness     Follow-up on elevated CPK levels.  Still elevated.  Was concerned that may be due to his statin.  But she has been off statin for months and CPK still elevated.  Autoimmune disease including celiac disease.  She has been very compliant with her celiac friendly diet.  Was able to see Dr. Angelita Love rheumatology  Blood testing was drawn.  Results pending  Still having some fatigue and vague muscle complaints  No rash    The following portions of the patient's history were reviewed and updated as appropriate: allergies, current medications, past family history, past medical history, past social history, past surgical history, and problem list.    Review of Systems    Objective   Vitals:    01/29/25 1444   BP: 110/64   BP Location: Left arm   Patient Position: Sitting   Cuff Size: Adult   Pulse: 80   Temp: 97.4 °F (36.3 °C)   SpO2: 98%   Weight: 68.9 kg (152 lb)   Height: 162.6 cm (64.02\")      Physical Exam  Vitals reviewed.   Musculoskeletal:      Right lower leg: No edema.      Left lower leg: No edema.   Skin:     Findings: No rash.   Neurological:      Mental Status: She is alert.       Results for orders placed or performed in visit on 01/23/25   CK    Collection Time: 01/27/25 10:02 AM    Specimen: Blood   Result Value Ref Range    Creatine Kinase 638 (H) 20 - 180 U/L   Lipid Panel    Collection Time: 01/27/25 10:02 AM    Specimen: Blood   Result Value Ref Range    Total Cholesterol 259 (H) 0 - 200 mg/dL    Triglycerides 93 0 - 150 mg/dL    HDL Cholesterol 76 (H) 40 - 60 mg/dL    VLDL Cholesterol Jairo 16 5 - 40 mg/dL    LDL Chol Calc (NIH) 167 (H) 0 - 100 mg/dL         Assessment & Plan   Diagnoses and all orders for this visit:    1. Mixed hyperlipidemia (Primary)  -     ezetimibe (Zetia) 10 MG tablet; Take 1 tablet by " mouth Daily.  Dispense: 90 tablet; Refill: 1    2. Elevated CPK    Continue follow-up with rheumatology regarding all the test results  Start Zetia for the cholesterol levels  Does not appear the statin drugs were responsible for the elevated CPK  But will avoid statins going forward to aid in the effort to diagnose    There are no Patient Instructions on file for this visit.    There are no discontinued medications.     Return for Next scheduled follow up.    Dr. Dav Palacios  [place], [place]

## 2025-02-19 DIAGNOSIS — E78.2 MIXED HYPERLIPIDEMIA: ICD-10-CM

## 2025-02-19 DIAGNOSIS — R74.8 ELEVATED CPK: Primary | ICD-10-CM

## 2025-02-19 DIAGNOSIS — R74.8 ELEVATED CPK: ICD-10-CM

## 2025-02-25 LAB
CHOLEST SERPL-MCNC: 220 MG/DL (ref 0–200)
CK SERPL-CCNC: 669 U/L (ref 20–180)
HDLC SERPL-MCNC: 82 MG/DL (ref 40–60)
LDLC SERPL CALC-MCNC: 125 MG/DL (ref 0–100)
TRIGL SERPL-MCNC: 72 MG/DL (ref 0–150)
VLDLC SERPL CALC-MCNC: 13 MG/DL (ref 5–40)

## 2025-02-26 ENCOUNTER — OFFICE VISIT (OUTPATIENT)
Dept: FAMILY MEDICINE CLINIC | Facility: CLINIC | Age: 65
End: 2025-02-26
Payer: MEDICARE

## 2025-02-26 VITALS
HEART RATE: 68 BPM | TEMPERATURE: 97.3 F | HEIGHT: 64 IN | DIASTOLIC BLOOD PRESSURE: 72 MMHG | BODY MASS INDEX: 25.73 KG/M2 | SYSTOLIC BLOOD PRESSURE: 118 MMHG | OXYGEN SATURATION: 98 % | WEIGHT: 150.7 LBS

## 2025-02-26 DIAGNOSIS — K90.0 CELIAC DISEASE: ICD-10-CM

## 2025-02-26 DIAGNOSIS — R74.8 ELEVATED CPK: ICD-10-CM

## 2025-02-26 DIAGNOSIS — M60.80 AUTOIMMUNE MYOSITIS OF SKELETAL MUSCLE: ICD-10-CM

## 2025-02-26 DIAGNOSIS — E78.2 MIXED HYPERLIPIDEMIA: Primary | ICD-10-CM

## 2025-02-26 RX ORDER — PREDNISONE 5 MG/1
5 TABLET ORAL DAILY
Qty: 14 TABLET | Refills: 0 | Status: SHIPPED | OUTPATIENT
Start: 2025-02-26

## 2025-02-26 RX ORDER — PROGESTERONE 100 MG/1
CAPSULE ORAL
COMMUNITY
Start: 2025-02-15 | End: 2025-02-26 | Stop reason: SDUPTHER

## 2025-02-26 NOTE — PROGRESS NOTES
"  Subjective   Allyson Wilder is a 64 y.o. female who is here for   Chief Complaint   Patient presents with    Follow-up    Hyperlipidemia   .     History of Present Illness     Allyson is here in follow-up for her elevated CPK levels.  Current with rheumatology  Cause unknown at this time.  May be autoimmune myositis.  Cholesterol is elevated.  We stopped her statin.  Added on Zetia.  It is helping somewhat  She is gone back to the gym for exercise which makes her feel great    The following portions of the patient's history were reviewed and updated as appropriate: allergies, current medications, past family history, past medical history, past social history, past surgical history, and problem list.    Review of Systems    Objective   Vitals:    02/26/25 0925   BP: 118/72   Pulse: 68   Temp: 97.3 °F (36.3 °C)   TempSrc: Infrared   SpO2: 98%   Weight: 68.4 kg (150 lb 11.2 oz)   Height: 162.6 cm (64.02\")      Physical Exam  Vitals reviewed.   Cardiovascular:      Rate and Rhythm: Normal rate.   Pulmonary:      Effort: Pulmonary effort is normal.   Musculoskeletal:         General: Tenderness present. No swelling.   Skin:     Findings: No rash.   Neurological:      Mental Status: She is alert.         Results for orders placed or performed in visit on 02/19/25   Lipid Panel    Collection Time: 02/24/25 11:21 AM    Specimen: Blood   Result Value Ref Range    Total Cholesterol 220 (H) 0 - 200 mg/dL    Triglycerides 72 0 - 150 mg/dL    HDL Cholesterol 82 (H) 40 - 60 mg/dL    VLDL Cholesterol Jairo 13 5 - 40 mg/dL    LDL Chol Calc (NIH) 125 (H) 0 - 100 mg/dL   CK    Collection Time: 02/24/25 11:21 AM    Specimen: Blood   Result Value Ref Range    Creatine Kinase 669 (H) 20 - 180 U/L         Assessment & Plan   Diagnoses and all orders for this visit:    1. Mixed hyperlipidemia (Primary)  -     Lipid Panel; Future    2. Elevated CPK  -     predniSONE (DELTASONE) 5 MG tablet; Take 1 tablet by mouth Daily.  Dispense: 14 " tablet; Refill: 0  -     CK; Future    3. Autoimmune myositis of skeletal muscle  -     CBC & Differential; Future  -     Comprehensive Metabolic Panel; Future  -     CK; Future  -     TSH Rfx On Abnormal To Free T4; Future  -     Urinalysis With Microscopic If Indicated (No Culture) - Urine, Clean Catch; Future    4. Celiac disease  -     CBC & Differential; Future  -     Comprehensive Metabolic Panel; Future  -     CK; Future  -     TSH Rfx On Abnormal To Free T4; Future  -     Urinalysis With Microscopic If Indicated (No Culture) - Urine, Clean Catch; Future    Lets stay on the Zetia for cholesterol  Okay to go back to the gym and continue exercise  Stay hydrated  Recheck blood work in 6 months, checking her repeat CPK level and kidney function.  Will send in the prednisone for general aches and pains.  She is going to [place] for vacation and wants to not have as much pain on vacation    There are no Patient Instructions on file for this visit.    Medications Discontinued During This Encounter   Medication Reason    predniSONE (DELTASONE) 5 MG tablet Reorder    Progesterone (PROMETRIUM) 100 MG capsule Duplicate order        No follow-ups on file.    Dr. Dav Palacios  [facility]  [place], [place]

## 2025-04-17 ENCOUNTER — OFFICE (AMBULATORY)
Dept: URBAN - METROPOLITAN AREA CLINIC 76 | Facility: CLINIC | Age: 65
End: 2025-04-17
Payer: COMMERCIAL

## 2025-04-17 VITALS
DIASTOLIC BLOOD PRESSURE: 89 MMHG | WEIGHT: 142 LBS | OXYGEN SATURATION: 99 % | HEART RATE: 64 BPM | SYSTOLIC BLOOD PRESSURE: 144 MMHG | HEIGHT: 64 IN

## 2025-04-17 DIAGNOSIS — R74.8 ABNORMAL LEVELS OF OTHER SERUM ENZYMES: ICD-10-CM

## 2025-04-17 DIAGNOSIS — K22.2 ESOPHAGEAL OBSTRUCTION: ICD-10-CM

## 2025-04-17 DIAGNOSIS — K29.70 GASTRITIS, UNSPECIFIED, WITHOUT BLEEDING: ICD-10-CM

## 2025-04-17 DIAGNOSIS — K52.9 NONINFECTIVE GASTROENTERITIS AND COLITIS, UNSPECIFIED: ICD-10-CM

## 2025-04-17 DIAGNOSIS — K22.4 DYSKINESIA OF ESOPHAGUS: ICD-10-CM

## 2025-04-17 DIAGNOSIS — K22.70 BARRETT'S ESOPHAGUS WITHOUT DYSPLASIA: ICD-10-CM

## 2025-04-17 DIAGNOSIS — K29.80 DUODENITIS WITHOUT BLEEDING: ICD-10-CM

## 2025-04-17 DIAGNOSIS — K90.0 CELIAC DISEASE: ICD-10-CM

## 2025-04-17 DIAGNOSIS — Z12.11 ENCOUNTER FOR SCREENING FOR MALIGNANT NEOPLASM OF COLON: ICD-10-CM

## 2025-04-17 DIAGNOSIS — R13.10 DYSPHAGIA, UNSPECIFIED: ICD-10-CM

## 2025-04-17 PROCEDURE — 99213 OFFICE O/P EST LOW 20 MIN: CPT | Performed by: INTERNAL MEDICINE

## 2025-07-07 DIAGNOSIS — E78.2 MIXED HYPERLIPIDEMIA: ICD-10-CM

## 2025-07-07 RX ORDER — EZETIMIBE 10 MG/1
10 TABLET ORAL DAILY
Qty: 90 TABLET | Refills: 3 | Status: SHIPPED | OUTPATIENT
Start: 2025-07-07

## (undated) DEVICE — UNDYED BRAIDED (POLYGLACTIN 910), SYNTHETIC ABSORBABLE SUTURE: Brand: COATED VICRYL

## (undated) DEVICE — ADHS SKIN DERMABOND TOP ADVANCED

## (undated) DEVICE — CODMAN® SURGICAL PATTIES 3/4" X 3/4" (1.91CM X 1.91CM): Brand: CODMAN®

## (undated) DEVICE — SMOKE EVACUATION TUBING WITH 7/8 IN TO 1/4 IN REDUCER: Brand: BUFFALO FILTER

## (undated) DEVICE — 6.0MM PRECISION ROUND

## (undated) DEVICE — SYR CONTRL LUERLOK 10CC

## (undated) DEVICE — CONN TBG Y 5 IN 1 LF STRL

## (undated) DEVICE — NEEDLE, QUINCKE, 20GX3.5": Brand: MEDLINE

## (undated) DEVICE — Device

## (undated) DEVICE — DRP MICROSCOPE 4 BINOCULAR CV 54X150IN

## (undated) DEVICE — GLV SURG SENSICARE W/ALOE PF LF 7.5 STRL

## (undated) DEVICE — PK NEURO SPINE 40

## (undated) DEVICE — GLV SURG BIOGEL LTX PF 7

## (undated) DEVICE — ANTIBACTERIAL UNDYED BRAIDED (POLYGLACTIN 910), SYNTHETIC ABSORBABLE SUTURE: Brand: COATED VICRYL

## (undated) DEVICE — SPNG GZ WOVN 4X4IN 12PLY 10/BX STRL

## (undated) DEVICE — APPL CHLORAPREP HI/LITE 26ML ORNG

## (undated) DEVICE — SOL NACL 0.9PCT 100ML SGL